# Patient Record
Sex: FEMALE | Race: WHITE | NOT HISPANIC OR LATINO | Employment: OTHER | URBAN - METROPOLITAN AREA
[De-identification: names, ages, dates, MRNs, and addresses within clinical notes are randomized per-mention and may not be internally consistent; named-entity substitution may affect disease eponyms.]

---

## 2017-06-22 DIAGNOSIS — I10 ESSENTIAL HYPERTENSION: ICD-10-CM

## 2017-06-22 DIAGNOSIS — E78.2 MIXED HYPERLIPIDEMIA: ICD-10-CM

## 2017-06-22 RX ORDER — CARVEDILOL 25 MG/1
TABLET ORAL
Qty: 180 TAB | Refills: 0 | Status: SHIPPED | OUTPATIENT
Start: 2017-06-22

## 2017-06-22 RX ORDER — SIMVASTATIN 20 MG
TABLET ORAL
Qty: 90 TAB | Refills: 0 | Status: SHIPPED | OUTPATIENT
Start: 2017-06-22 | End: 2023-11-09

## 2017-12-07 DIAGNOSIS — I10 BENIGN ESSENTIAL HYPERTENSION: Chronic | ICD-10-CM

## 2017-12-07 DIAGNOSIS — I42.0 DILATED CARDIOMYOPATHY (HCC): Chronic | ICD-10-CM

## 2017-12-07 DIAGNOSIS — I50.42 CHRONIC COMBINED SYSTOLIC AND DIASTOLIC CONGESTIVE HEART FAILURE (HCC): ICD-10-CM

## 2017-12-07 RX ORDER — FUROSEMIDE 20 MG/1
TABLET ORAL
Qty: 90 TAB | Refills: 0 | Status: ON HOLD | OUTPATIENT
Start: 2017-12-07 | End: 2022-05-16

## 2021-03-03 DIAGNOSIS — Z23 NEED FOR VACCINATION: ICD-10-CM

## 2022-05-15 ENCOUNTER — APPOINTMENT (OUTPATIENT)
Dept: RADIOLOGY | Facility: MEDICAL CENTER | Age: 67
End: 2022-05-15
Attending: EMERGENCY MEDICINE
Payer: MEDICARE

## 2022-05-15 ENCOUNTER — HOSPITAL ENCOUNTER (OUTPATIENT)
Facility: MEDICAL CENTER | Age: 67
End: 2022-05-16
Attending: EMERGENCY MEDICINE | Admitting: STUDENT IN AN ORGANIZED HEALTH CARE EDUCATION/TRAINING PROGRAM
Payer: MEDICARE

## 2022-05-15 DIAGNOSIS — N28.9 ACUTE RENAL INSUFFICIENCY: ICD-10-CM

## 2022-05-15 DIAGNOSIS — I95.9 HYPOTENSION, UNSPECIFIED HYPOTENSION TYPE: ICD-10-CM

## 2022-05-15 DIAGNOSIS — R53.1 GENERALIZED WEAKNESS: ICD-10-CM

## 2022-05-15 DIAGNOSIS — R53.1 WEAKNESS: ICD-10-CM

## 2022-05-15 DIAGNOSIS — R73.9 HYPERGLYCEMIA: ICD-10-CM

## 2022-05-15 PROBLEM — N17.9 AKI (ACUTE KIDNEY INJURY) (HCC): Status: ACTIVE | Noted: 2022-05-15

## 2022-05-15 LAB
ALBUMIN SERPL BCP-MCNC: 3.8 G/DL (ref 3.2–4.9)
ALBUMIN/GLOB SERPL: 1.2 G/DL
ALP SERPL-CCNC: 81 U/L (ref 30–99)
ALT SERPL-CCNC: 36 U/L (ref 2–50)
ANION GAP SERPL CALC-SCNC: 14 MMOL/L (ref 7–16)
APPEARANCE UR: CLEAR
AST SERPL-CCNC: 35 U/L (ref 12–45)
BACTERIA #/AREA URNS HPF: NEGATIVE /HPF
BASOPHILS # BLD AUTO: 0.7 % (ref 0–1.8)
BASOPHILS # BLD: 0.05 K/UL (ref 0–0.12)
BILIRUB SERPL-MCNC: 0.3 MG/DL (ref 0.1–1.5)
BILIRUB UR QL STRIP.AUTO: NEGATIVE
BUN SERPL-MCNC: 28 MG/DL (ref 8–22)
CALCIUM SERPL-MCNC: 8.8 MG/DL (ref 8.5–10.5)
CHLORIDE SERPL-SCNC: 101 MMOL/L (ref 96–112)
CO2 SERPL-SCNC: 20 MMOL/L (ref 20–33)
COLOR UR: YELLOW
CREAT SERPL-MCNC: 1.82 MG/DL (ref 0.5–1.4)
EOSINOPHIL # BLD AUTO: 0.25 K/UL (ref 0–0.51)
EOSINOPHIL NFR BLD: 3.6 % (ref 0–6.9)
EPI CELLS #/AREA URNS HPF: ABNORMAL /HPF
ERYTHROCYTE [DISTWIDTH] IN BLOOD BY AUTOMATED COUNT: 44.4 FL (ref 35.9–50)
GFR SERPLBLD CREATININE-BSD FMLA CKD-EPI: 30 ML/MIN/1.73 M 2
GLOBULIN SER CALC-MCNC: 3.1 G/DL (ref 1.9–3.5)
GLUCOSE BLD STRIP.AUTO-MCNC: 261 MG/DL (ref 65–99)
GLUCOSE BLD STRIP.AUTO-MCNC: 91 MG/DL (ref 65–99)
GLUCOSE SERPL-MCNC: 271 MG/DL (ref 65–99)
GLUCOSE UR STRIP.AUTO-MCNC: >=1000 MG/DL
HCT VFR BLD AUTO: 35.1 % (ref 37–47)
HGB BLD-MCNC: 11.2 G/DL (ref 12–16)
IMM GRANULOCYTES # BLD AUTO: 0.06 K/UL (ref 0–0.11)
IMM GRANULOCYTES NFR BLD AUTO: 0.9 % (ref 0–0.9)
KETONES UR STRIP.AUTO-MCNC: NEGATIVE MG/DL
LACTATE BLD-SCNC: 1 MMOL/L (ref 0.5–2)
LACTATE BLD-SCNC: 1.9 MMOL/L (ref 0.5–2)
LEUKOCYTE ESTERASE UR QL STRIP.AUTO: ABNORMAL
LYMPHOCYTES # BLD AUTO: 1.92 K/UL (ref 1–4.8)
LYMPHOCYTES NFR BLD: 28 % (ref 22–41)
MCH RBC QN AUTO: 25.7 PG (ref 27–33)
MCHC RBC AUTO-ENTMCNC: 31.9 G/DL (ref 33.6–35)
MCV RBC AUTO: 80.5 FL (ref 81.4–97.8)
MICRO URNS: ABNORMAL
MONOCYTES # BLD AUTO: 0.59 K/UL (ref 0–0.85)
MONOCYTES NFR BLD AUTO: 8.6 % (ref 0–13.4)
NEUTROPHILS # BLD AUTO: 3.98 K/UL (ref 2–7.15)
NEUTROPHILS NFR BLD: 58.2 % (ref 44–72)
NITRITE UR QL STRIP.AUTO: NEGATIVE
NRBC # BLD AUTO: 0 K/UL
NRBC BLD-RTO: 0 /100 WBC
PH UR STRIP.AUTO: 5 [PH] (ref 5–8)
PLATELET # BLD AUTO: 173 K/UL (ref 164–446)
PMV BLD AUTO: 9.4 FL (ref 9–12.9)
POC BREATHALIZER: 0 PERCENT (ref 0–0.01)
POTASSIUM SERPL-SCNC: 3.6 MMOL/L (ref 3.6–5.5)
PROT SERPL-MCNC: 6.9 G/DL (ref 6–8.2)
PROT UR QL STRIP: NEGATIVE MG/DL
RBC # BLD AUTO: 4.36 M/UL (ref 4.2–5.4)
RBC # URNS HPF: ABNORMAL /HPF
RBC UR QL AUTO: NEGATIVE
SODIUM SERPL-SCNC: 135 MMOL/L (ref 135–145)
SP GR UR STRIP.AUTO: 1.02
UROBILINOGEN UR STRIP.AUTO-MCNC: 0.2 MG/DL
WBC # BLD AUTO: 6.9 K/UL (ref 4.8–10.8)
WBC #/AREA URNS HPF: ABNORMAL /HPF

## 2022-05-15 PROCEDURE — 71045 X-RAY EXAM CHEST 1 VIEW: CPT

## 2022-05-15 PROCEDURE — 87040 BLOOD CULTURE FOR BACTERIA: CPT

## 2022-05-15 PROCEDURE — 700105 HCHG RX REV CODE 258: Performed by: EMERGENCY MEDICINE

## 2022-05-15 PROCEDURE — 700102 HCHG RX REV CODE 250 W/ 637 OVERRIDE(OP): Performed by: STUDENT IN AN ORGANIZED HEALTH CARE EDUCATION/TRAINING PROGRAM

## 2022-05-15 PROCEDURE — 700105 HCHG RX REV CODE 258: Performed by: STUDENT IN AN ORGANIZED HEALTH CARE EDUCATION/TRAINING PROGRAM

## 2022-05-15 PROCEDURE — 81001 URINALYSIS AUTO W/SCOPE: CPT

## 2022-05-15 PROCEDURE — 99220 PR INITIAL OBSERVATION CARE,LEVL III: CPT | Performed by: STUDENT IN AN ORGANIZED HEALTH CARE EDUCATION/TRAINING PROGRAM

## 2022-05-15 PROCEDURE — 93005 ELECTROCARDIOGRAM TRACING: CPT | Performed by: STUDENT IN AN ORGANIZED HEALTH CARE EDUCATION/TRAINING PROGRAM

## 2022-05-15 PROCEDURE — 80053 COMPREHEN METABOLIC PANEL: CPT

## 2022-05-15 PROCEDURE — A9270 NON-COVERED ITEM OR SERVICE: HCPCS | Performed by: STUDENT IN AN ORGANIZED HEALTH CARE EDUCATION/TRAINING PROGRAM

## 2022-05-15 PROCEDURE — 302970 POC BREATHALIZER: Performed by: EMERGENCY MEDICINE

## 2022-05-15 PROCEDURE — 96372 THER/PROPH/DIAG INJ SC/IM: CPT

## 2022-05-15 PROCEDURE — 85025 COMPLETE CBC W/AUTO DIFF WBC: CPT

## 2022-05-15 PROCEDURE — 700111 HCHG RX REV CODE 636 W/ 250 OVERRIDE (IP): Performed by: STUDENT IN AN ORGANIZED HEALTH CARE EDUCATION/TRAINING PROGRAM

## 2022-05-15 PROCEDURE — 99285 EMERGENCY DEPT VISIT HI MDM: CPT

## 2022-05-15 PROCEDURE — 82962 GLUCOSE BLOOD TEST: CPT | Mod: 91

## 2022-05-15 PROCEDURE — 36415 COLL VENOUS BLD VENIPUNCTURE: CPT

## 2022-05-15 PROCEDURE — G0378 HOSPITAL OBSERVATION PER HR: HCPCS

## 2022-05-15 PROCEDURE — 83605 ASSAY OF LACTIC ACID: CPT

## 2022-05-15 PROCEDURE — 87086 URINE CULTURE/COLONY COUNT: CPT

## 2022-05-15 RX ORDER — HEPARIN SODIUM 5000 [USP'U]/ML
5000 INJECTION, SOLUTION INTRAVENOUS; SUBCUTANEOUS EVERY 8 HOURS
Status: DISCONTINUED | OUTPATIENT
Start: 2022-05-15 | End: 2022-05-16 | Stop reason: HOSPADM

## 2022-05-15 RX ORDER — NICOTINE 21 MG/24HR
1 PATCH, TRANSDERMAL 24 HOURS TRANSDERMAL EVERY 24 HOURS
COMMUNITY
End: 2022-12-03

## 2022-05-15 RX ORDER — SODIUM CHLORIDE 9 MG/ML
INJECTION, SOLUTION INTRAVENOUS CONTINUOUS
Status: ACTIVE | OUTPATIENT
Start: 2022-05-15 | End: 2022-05-16

## 2022-05-15 RX ORDER — SIMVASTATIN 40 MG
20 TABLET ORAL EVERY EVENING
Status: DISCONTINUED | OUTPATIENT
Start: 2022-05-15 | End: 2022-05-16 | Stop reason: HOSPADM

## 2022-05-15 RX ORDER — GLIPIZIDE 10 MG/1
10 TABLET ORAL 2 TIMES DAILY
Status: ON HOLD | COMMUNITY
End: 2022-07-12

## 2022-05-15 RX ORDER — GABAPENTIN 300 MG/1
300 CAPSULE ORAL 3 TIMES DAILY
Status: DISCONTINUED | OUTPATIENT
Start: 2022-05-15 | End: 2022-05-15

## 2022-05-15 RX ORDER — INSULIN ASPART 100 [IU]/ML
2-18 INJECTION, SOLUTION INTRAVENOUS; SUBCUTANEOUS 2 TIMES DAILY
COMMUNITY

## 2022-05-15 RX ORDER — TIOTROPIUM BROMIDE INHALATION SPRAY 3.12 UG/1
5 SPRAY, METERED RESPIRATORY (INHALATION) DAILY
COMMUNITY
End: 2022-12-03

## 2022-05-15 RX ORDER — GABAPENTIN 300 MG/1
600-900 CAPSULE ORAL
Status: ON HOLD | COMMUNITY
End: 2022-12-06

## 2022-05-15 RX ORDER — GABAPENTIN 300 MG/1
300 CAPSULE ORAL 2 TIMES DAILY
Status: DISCONTINUED | OUTPATIENT
Start: 2022-05-15 | End: 2022-05-16 | Stop reason: HOSPADM

## 2022-05-15 RX ORDER — INSULIN ASPART 100 [IU]/ML
INJECTION, SOLUTION INTRAVENOUS; SUBCUTANEOUS
Status: SHIPPED | COMMUNITY
End: 2022-05-15

## 2022-05-15 RX ORDER — FAMOTIDINE 20 MG/1
20 TABLET, FILM COATED ORAL 2 TIMES DAILY
COMMUNITY
End: 2023-11-09

## 2022-05-15 RX ORDER — LINAGLIPTIN 5 MG/1
5 TABLET, FILM COATED ORAL DAILY
COMMUNITY
End: 2023-11-09

## 2022-05-15 RX ORDER — SODIUM CHLORIDE, SODIUM LACTATE, POTASSIUM CHLORIDE, AND CALCIUM CHLORIDE .6; .31; .03; .02 G/100ML; G/100ML; G/100ML; G/100ML
30 INJECTION, SOLUTION INTRAVENOUS ONCE
Status: COMPLETED | OUTPATIENT
Start: 2022-05-15 | End: 2022-05-15

## 2022-05-15 RX ORDER — EMPAGLIFLOZIN 25 MG/1
1 TABLET, FILM COATED ORAL DAILY
COMMUNITY
End: 2023-11-09

## 2022-05-15 RX ORDER — CYCLOBENZAPRINE HCL 10 MG
10 TABLET ORAL
COMMUNITY
End: 2022-12-03

## 2022-05-15 RX ADMIN — SIMVASTATIN 20 MG: 40 TABLET, FILM COATED ORAL at 22:06

## 2022-05-15 RX ADMIN — SODIUM CHLORIDE, POTASSIUM CHLORIDE, SODIUM LACTATE AND CALCIUM CHLORIDE 1932 ML: 600; 310; 30; 20 INJECTION, SOLUTION INTRAVENOUS at 16:26

## 2022-05-15 RX ADMIN — INSULIN GLARGINE-YFGN 15 UNITS: 100 INJECTION, SOLUTION SUBCUTANEOUS at 21:33

## 2022-05-15 RX ADMIN — SODIUM CHLORIDE: 9 INJECTION, SOLUTION INTRAVENOUS at 21:12

## 2022-05-15 RX ADMIN — HEPARIN SODIUM 5000 UNITS: 5000 INJECTION, SOLUTION INTRAVENOUS; SUBCUTANEOUS at 21:31

## 2022-05-15 RX ADMIN — GABAPENTIN 300 MG: 300 CAPSULE ORAL at 21:30

## 2022-05-15 ASSESSMENT — ENCOUNTER SYMPTOMS
EYES NEGATIVE: 1
GASTROINTESTINAL NEGATIVE: 1
MUSCULOSKELETAL NEGATIVE: 1
CARDIOVASCULAR NEGATIVE: 1
WEAKNESS: 1
PSYCHIATRIC NEGATIVE: 1
RESPIRATORY NEGATIVE: 1

## 2022-05-15 ASSESSMENT — PATIENT HEALTH QUESTIONNAIRE - PHQ9
SUM OF ALL RESPONSES TO PHQ9 QUESTIONS 1 AND 2: 0
1. LITTLE INTEREST OR PLEASURE IN DOING THINGS: NOT AT ALL
2. FEELING DOWN, DEPRESSED, IRRITABLE, OR HOPELESS: NOT AT ALL

## 2022-05-15 ASSESSMENT — LIFESTYLE VARIABLES
AVERAGE NUMBER OF DAYS PER WEEK YOU HAVE A DRINK CONTAINING ALCOHOL: 0
TOTAL SCORE: 0
HAVE PEOPLE ANNOYED YOU BY CRITICIZING YOUR DRINKING: NO
TOTAL SCORE: 0
EVER HAD A DRINK FIRST THING IN THE MORNING TO STEADY YOUR NERVES TO GET RID OF A HANGOVER: NO
TOTAL SCORE: 0
EVER FELT BAD OR GUILTY ABOUT YOUR DRINKING: NO
ALCOHOL_USE: NO
CONSUMPTION TOTAL: NEGATIVE
HOW MANY TIMES IN THE PAST YEAR HAVE YOU HAD 5 OR MORE DRINKS IN A DAY: 0
ON A TYPICAL DAY WHEN YOU DRINK ALCOHOL HOW MANY DRINKS DO YOU HAVE: 0
HAVE YOU EVER FELT YOU SHOULD CUT DOWN ON YOUR DRINKING: NO

## 2022-05-15 ASSESSMENT — FIBROSIS 4 INDEX: FIB4 SCORE: 2.29

## 2022-05-15 ASSESSMENT — PAIN DESCRIPTION - PAIN TYPE: TYPE: ACUTE PAIN

## 2022-05-15 NOTE — ED NOTES
Patient drawn for blood cultures by lab. Fluids infusing per orders. Patient aware of need for urine.

## 2022-05-15 NOTE — ED TRIAGE NOTES
Chief Complaint   Patient presents with   • Weakness     Patient for the last three days has been feeling generalized weakness and possibly confusion.     Pt reports having lower BP over the last couple days and her blood sugar on arrival was 338 and she took her home 8 units per her sliding scale.     PIV present on arrival. EMS with no treatment. Pt is AOx4.  Chart up for ERP.

## 2022-05-15 NOTE — ED PROVIDER NOTES
ED Provider  Scribed for Estuardo Marte D.O. by Mayito Kraft. 5/15/2022  3:30 PM    Means of arrival:EMS  History obtained from:Patient  History limited by: None    CHIEF COMPLAINT  Chief Complaint   Patient presents with    Weakness     Patient for the last three days has been feeling generalized weakness and possibly confusion.       HPI  Mckenna Sewell is a 66 y.o. female with history of pacemaker placement who presents for generalized weakness onset 3 days ago. Patient reports she was hospitalized for 5 days several months ago for pneumonia. Patient is currently on 2-3 L O2 at home. Daughter reports the patient was hypotensive at around 86/50 this morning, which prompted her to call EMS. Patient endorses associated shortness of breath, but denies any pain with urination, changes in urinary frequency, unilateral weakness, fevers, chills, nausea, or vomitiing.     REVIEW OF SYSTEMS  See HPI for further details. All other systems are negative.     PAST MEDICAL HISTORY   has a past medical history of MAXIMILIANO (acute kidney injury) ([facility]) (5/15/2022), Benign essential hypertension, CAD (coronary artery disease), CHF (congestive heart failure) (HCC), Congestive heart failure (HCC), COPD, COPD (chronic obstructive pulmonary disease) ([facility]), Dilated cardiomyopathy (HCC), History of cardiac catheterization, Hypertension, Mixed hyperlipidemia, Nicotine dependence, Nicotine dependence, and Type 2 diabetes mellitus with complication ([facility]).    SOCIAL HISTORY  Social History     Tobacco Use    Smoking status: Current Every Day Smoker    Smokeless tobacco: None noted    Tobacco comment: 1/2 ppd   Substance and Sexual Activity    Alcohol use: No    Drug use: No    Sexual activity: None noted       SURGICAL HISTORY   has a past surgical history that includes other cardiac surgery and appendectomy.    CURRENT MEDICATIONS  Home Medications       Reviewed by Luca Mercado (Pharmacy Tech) on 05/15/22 at 2000  Med List Status:  "Complete     Medication Last Dose Status   albuterol (VENTOLIN OR PROVENTIL) 108 (90 BASE) MCG/ACT Aero Soln inhalation aerosol PRN Active   aspirin EC (ECOTRIN) 81 MG TBEC 5/15/2022 Active   carvedilol (COREG) 25 MG Tab 5/15/2022 Active   cyclobenzaprine (FLEXERIL) 10 mg Tab 5/14/2022 Active   Empagliflozin (JARDIANCE) 25 MG Tab 5/15/2022 Active   famotidine (PEPCID) 20 MG Tab 5/15/2022 Active   furosemide (LASIX) 20 MG Tab 5/15/2022 Active   gabapentin (NEURONTIN) 300 MG Cap 5/14/2022 Active   glipiZIDE (GLUCOTROL) 10 MG Tab 5/15/2022 Active   insulin aspart (NOVOLOG FLEXPEN) 100 UNIT/ML injection PEN 5/15/2022 Active   Ipratropium-Albuterol (COMBIVENT INH) PRN Active   linagliptin (TRADJENTA) 5 MG Tab tablet 5/15/2022 Active   losartan (COZAAR) 50 MG Tab 5/15/2022 Active   nicotine (NICODERM) 14 MG/24HR PATCH 24 HR >2 DAY Active   simvastatin (ZOCOR) 20 MG Tab 5/14/2022 Active   tiotropium (SPIRIVA RESPIMAT) 2.5 mcg/Act Aero Soln 5/15/2022 Active                    ALLERGIES  Allergies   Allergen Reactions    Advair Diskus      Rash/hives    Codeine     Lisinopril     Sulfa Drugs        PHYSICAL EXAM  VITAL SIGNS: BP (!) 99/56   Pulse 77   Resp 18   Ht 1.626 m (5' 4\")   Wt 64.4 kg (142 lb)   SpO2 91%   BMI 24.37 kg/m²   Constitutional: Alert in no apparent distress.  HENT: No signs of trauma, mucous membranes are moist  Eyes: Conjunctiva normal, Non-icteric.   Neck: Normal range of motion, No tenderness, Supple.  Lymphatic: No lymphadenopathy noted.   Cardiovascular: Regular rate and rhythm, no murmurs.   Thorax & Lungs: Normal breath sounds, No respiratory distress, No wheezing, No chest tenderness.   Abdomen: Bowel sounds normal, Soft, No tenderness, No masses, No pulsatile masses. No peritoneal signs.  Skin: Warm, Dry, normal color.   Back: No bony tenderness, No CVA tenderness.   Extremities: No edema, No tenderness, No cyanosis  Musculoskeletal: Good range of motion in all major joints. No tenderness " to palpation or major deformities noted.   Neurologic: Alert and oriented x4, Normal motor function, Normal sensory function, No focal deficits noted.   Psychiatric: Affect normal, Judgment normal, Mood normal.     DIAGNOSTIC STUDIES / PROCEDURES    LABS  Results for orders placed or performed during the hospital encounter of 05/15/22   LACTIC ACID   Result Value Ref Range    Lactic Acid 1.9 0.5 - 2.0 mmol/L   LACTIC ACID   Result Value Ref Range    Lactic Acid 1.0 0.5 - 2.0 mmol/L   CBC WITH DIFFERENTIAL   Result Value Ref Range    WBC 6.9 4.8 - 10.8 K/uL    RBC 4.36 4.20 - 5.40 M/uL    Hemoglobin 11.2 (L) 12.0 - 16.0 g/dL    Hematocrit 35.1 (L) 37.0 - 47.0 %    MCV 80.5 (L) 81.4 - 97.8 fL    MCH 25.7 (L) 27.0 - 33.0 pg    MCHC 31.9 (L) 33.6 - 35.0 g/dL    RDW 44.4 35.9 - 50.0 fL    Platelet Count 173 164 - 446 K/uL    MPV 9.4 9.0 - 12.9 fL    Neutrophils-Polys 58.20 44.00 - 72.00 %    Lymphocytes 28.00 22.00 - 41.00 %    Monocytes 8.60 0.00 - 13.40 %    Eosinophils 3.60 0.00 - 6.90 %    Basophils 0.70 0.00 - 1.80 %    Immature Granulocytes 0.90 0.00 - 0.90 %    Nucleated RBC 0.00 /100 WBC    Neutrophils (Absolute) 3.98 2.00 - 7.15 K/uL    Lymphs (Absolute) 1.92 1.00 - 4.80 K/uL    Monos (Absolute) 0.59 0.00 - 0.85 K/uL    Eos (Absolute) 0.25 0.00 - 0.51 K/uL    Baso (Absolute) 0.05 0.00 - 0.12 K/uL    Immature Granulocytes (abs) 0.06 0.00 - 0.11 K/uL    NRBC (Absolute) 0.00 K/uL   COMP METABOLIC PANEL   Result Value Ref Range    Sodium 135 135 - 145 mmol/L    Potassium 3.6 3.6 - 5.5 mmol/L    Chloride 101 96 - 112 mmol/L    Co2 20 20 - 33 mmol/L    Anion Gap 14.0 7.0 - 16.0    Glucose 271 (H) 65 - 99 mg/dL    Bun 28 (H) 8 - 22 mg/dL    Creatinine 1.82 (H) 0.50 - 1.40 mg/dL    Calcium 8.8 8.5 - 10.5 mg/dL    AST(SGOT) 35 12 - 45 U/L    ALT(SGPT) 36 2 - 50 U/L    Alkaline Phosphatase 81 30 - 99 U/L    Total Bilirubin 0.3 0.1 - 1.5 mg/dL    Albumin 3.8 3.2 - 4.9 g/dL    Total Protein 6.9 6.0 - 8.2 g/dL    Globulin  3.1 1.9 - 3.5 g/dL    A-G Ratio 1.2 g/dL   URINALYSIS    Specimen: Urine   Result Value Ref Range    Micro Urine Req Microscopic    ESTIMATED GFR   Result Value Ref Range    GFR (CKD-EPI) 30 (A) >60 mL/min/1.73 m 2   POC BREATHALIZER   Result Value Ref Range    POC Breathalizer 0.00 0.00 - 0.01 Percent   POCT glucose device results   Result Value Ref Range    POC Glucose, Blood 261 (H) 65 - 99 mg/dL      All labs reviewed by me.    RADIOLOGY  DX-CHEST-PORTABLE (1 VIEW)   Final Result      No acute cardiopulmonary abnormality identified.        The radiologist's interpretations of all radiological studies have been reviewed by me.    Films have been independently by me      COURSE  Pertinent Labs & Imaging studies reviewed. (See chart for details)    3:30 PM - Patient seen and examined at bedside. Discussed plan of care. The patient will be resuscitated with Bolus. Ordered for CXR, Lactic acid, CBC w/ diff, CMP, UA, Urine Culture, and Blood Culture to evaluate her symptoms.     5:05 PM - Patient was reevaluated at bedside. Discussed lab and radiology results with the patient and/or family. Ordered POC Breathalizer to further evaluate.      6:30 PM I discussed the patient's case and the above findings with Dr. Torres (Hospitalist) who agrees to evaluate the patient for hospitalization.    HYDRATION: Based on the patient's presentation of Sepsis the patient was given IV fluids. IV Hydration was used because oral hydration was not adequate alone. Upon recheck following hydration, the patient was improved.     MEDICAL DECISION MAKING  This is a 66 y.o. female who presents with generalized weakness, she has been walking into the walls.  At home her blood pressure was found to be in the 80s, and did improve with sitting down.  IV fluids were given here.  Her blood pressure was low normal and her blood pressure did improve and her symptoms improved after receiving some IV fluids.    Septic evaluation was done and there is  no signs of obvious sepsis.  Urinalysis was pending at time of admission.  Spoke with hospitalist for admission she will be admitted for further evaluation and treatment    Critical care time 30 minutes      DISPOSITION:  Patient will be hospitalized by Dr. Torres in guarded condition.     FINAL IMPRESSION  1. Weakness    2. Acute renal insufficiency    3. Hyperglycemia    4. Generalized weakness    5. Hypotension, unspecified hypotension type         I, Mayito Kraft (Scribe), am scribing for, and in the presence of, Estuardo Marte D.O..    Electronically signed by: Mayito Kraft (Scribe), 5/15/2022    IEstuardo D.O. personally performed the services described in this documentation, as scribed by Mayito Kraft in my presence, and it is both accurate and complete.    The note accurately reflects work and decisions made by me.  Estuardo Marte D.O.  5/15/2022  8:48 PM

## 2022-05-15 NOTE — ED TRIAGE NOTES
"Patient's FSBS on arrival is 261. Pt states she ate chinese food and a sugary coffee today. Per daughter at bedside she has been sleeping excessively in the last couple days and \"not herself\".     "

## 2022-05-16 VITALS
SYSTOLIC BLOOD PRESSURE: 144 MMHG | RESPIRATION RATE: 18 BRPM | HEIGHT: 64 IN | BODY MASS INDEX: 25.63 KG/M2 | TEMPERATURE: 97 F | OXYGEN SATURATION: 91 % | HEART RATE: 87 BPM | WEIGHT: 150.13 LBS | DIASTOLIC BLOOD PRESSURE: 70 MMHG

## 2022-05-16 PROBLEM — N17.9 AKI (ACUTE KIDNEY INJURY) (HCC): Status: RESOLVED | Noted: 2022-05-15 | Resolved: 2022-05-16

## 2022-05-16 PROBLEM — R53.1 GENERALIZED WEAKNESS: Status: RESOLVED | Noted: 2022-05-15 | Resolved: 2022-05-16

## 2022-05-16 LAB
ANION GAP SERPL CALC-SCNC: 10 MMOL/L (ref 7–16)
BUN SERPL-MCNC: 23 MG/DL (ref 8–22)
CALCIUM SERPL-MCNC: 8.6 MG/DL (ref 8.5–10.5)
CHLORIDE SERPL-SCNC: 107 MMOL/L (ref 96–112)
CO2 SERPL-SCNC: 24 MMOL/L (ref 20–33)
CREAT SERPL-MCNC: 1.07 MG/DL (ref 0.5–1.4)
EKG IMPRESSION: NORMAL
FERRITIN SERPL-MCNC: 33.8 NG/ML (ref 10–291)
GFR SERPLBLD CREATININE-BSD FMLA CKD-EPI: 56 ML/MIN/1.73 M 2
GLUCOSE BLD STRIP.AUTO-MCNC: 162 MG/DL (ref 65–99)
GLUCOSE BLD STRIP.AUTO-MCNC: 88 MG/DL (ref 65–99)
GLUCOSE SERPL-MCNC: 170 MG/DL (ref 65–99)
IRON SATN MFR SERPL: 9 % (ref 15–55)
IRON SERPL-MCNC: 32 UG/DL (ref 40–170)
POTASSIUM SERPL-SCNC: 3.4 MMOL/L (ref 3.6–5.5)
SODIUM SERPL-SCNC: 141 MMOL/L (ref 135–145)
TIBC SERPL-MCNC: 367 UG/DL (ref 250–450)
UIBC SERPL-MCNC: 335 UG/DL (ref 110–370)

## 2022-05-16 PROCEDURE — 700102 HCHG RX REV CODE 250 W/ 637 OVERRIDE(OP): Performed by: STUDENT IN AN ORGANIZED HEALTH CARE EDUCATION/TRAINING PROGRAM

## 2022-05-16 PROCEDURE — 80048 BASIC METABOLIC PNL TOTAL CA: CPT

## 2022-05-16 PROCEDURE — 82962 GLUCOSE BLOOD TEST: CPT

## 2022-05-16 PROCEDURE — A9270 NON-COVERED ITEM OR SERVICE: HCPCS | Performed by: STUDENT IN AN ORGANIZED HEALTH CARE EDUCATION/TRAINING PROGRAM

## 2022-05-16 PROCEDURE — 83550 IRON BINDING TEST: CPT

## 2022-05-16 PROCEDURE — 96372 THER/PROPH/DIAG INJ SC/IM: CPT

## 2022-05-16 PROCEDURE — 93010 ELECTROCARDIOGRAM REPORT: CPT | Performed by: INTERNAL MEDICINE

## 2022-05-16 PROCEDURE — 82728 ASSAY OF FERRITIN: CPT

## 2022-05-16 PROCEDURE — 83540 ASSAY OF IRON: CPT

## 2022-05-16 PROCEDURE — 97161 PT EVAL LOW COMPLEX 20 MIN: CPT

## 2022-05-16 PROCEDURE — G0378 HOSPITAL OBSERVATION PER HR: HCPCS

## 2022-05-16 PROCEDURE — 700102 HCHG RX REV CODE 250 W/ 637 OVERRIDE(OP): Performed by: INTERNAL MEDICINE

## 2022-05-16 PROCEDURE — 99217 PR OBSERVATION CARE DISCHARGE: CPT | Performed by: INTERNAL MEDICINE

## 2022-05-16 PROCEDURE — A9270 NON-COVERED ITEM OR SERVICE: HCPCS | Performed by: INTERNAL MEDICINE

## 2022-05-16 RX ORDER — POTASSIUM CHLORIDE 20 MEQ/1
40 TABLET, EXTENDED RELEASE ORAL ONCE
Status: COMPLETED | OUTPATIENT
Start: 2022-05-16 | End: 2022-05-16

## 2022-05-16 RX ADMIN — POTASSIUM CHLORIDE 40 MEQ: 1500 TABLET, EXTENDED RELEASE ORAL at 08:34

## 2022-05-16 RX ADMIN — INSULIN HUMAN 3 UNITS: 100 INJECTION, SOLUTION PARENTERAL at 00:57

## 2022-05-16 RX ADMIN — GABAPENTIN 300 MG: 300 CAPSULE ORAL at 06:21

## 2022-05-16 RX ADMIN — ASPIRIN 81 MG: 81 TABLET, COATED ORAL at 06:21

## 2022-05-16 ASSESSMENT — COGNITIVE AND FUNCTIONAL STATUS - GENERAL
MOVING TO AND FROM BED TO CHAIR: A LITTLE
MOBILITY SCORE: 21
SUGGESTED CMS G CODE MODIFIER MOBILITY: CJ
TURNING FROM BACK TO SIDE WHILE IN FLAT BAD: A LITTLE
MOVING FROM LYING ON BACK TO SITTING ON SIDE OF FLAT BED: A LITTLE

## 2022-05-16 ASSESSMENT — GAIT ASSESSMENTS
GAIT LEVEL OF ASSIST: SUPERVISED
DEVIATION: DECREASED HEEL STRIKE;DECREASED TOE OFF
DISTANCE (FEET): 50

## 2022-05-16 ASSESSMENT — PAIN DESCRIPTION - PAIN TYPE: TYPE: ACUTE PAIN

## 2022-05-16 NOTE — PROGRESS NOTES
4 Eyes Skin Assessment Completed by AUNDREA Sheikh and AUNDREA Davis.    Head WDL  Ears WDL  Nose WDL  Mouth WDL  Neck WDL  Breast/Chest WDL  Shoulder Blades WDL  Spine WDL  (R) Arm/Elbow/Hand WDL  (L) Arm/Elbow/Hand WDL  Abdomen WDL  Groin WDL  Scrotum/Coccyx/Buttocks WDL  (R) Leg WDL  (L) Leg WDL  (R) Heel/Foot/Toe WDL  (L) Heel/Foot/Toe WDL          Devices In Places Pulse Ox      Interventions In Place N/A    Possible Skin Injury No    Pictures Uploaded Into Epic N/A  Wound Consult Placed N/A  RN Wound Prevention Protocol Ordered No

## 2022-05-16 NOTE — PROGRESS NOTES
Received bedside report from NOC RN. Pt is A&Ox4. Pt is sitting up in bed, no signs of labored breathing or pain. Denies CP/SOB. Pt on 3L of O2, baseline per pt. Call light & personal belongings within reach, bed in lowest position & locked. Fall precautions in place and education provided on how to use call light, hourly rounding in place. Educated on calling before getting OOB. Pt updated on plan of care for the shift. Pt declines any additional needs at this time.

## 2022-05-16 NOTE — DISCHARGE SUMMARY
Discharge Summary    CHIEF COMPLAINT ON ADMISSION  Chief Complaint   Patient presents with   • Weakness     Patient for the last three days has been feeling generalized weakness and possibly confusion.       Reason for Admission  Acute kidney injury likely related to Lasix  Iron deficiency anemia    Admission Date  5/15/2022    CODE STATUS  Full Code    HPI & HOSPITAL COURSE    66-year-old female with history of heart failure, pacemaker, diabetes and dyslipidemia with hypertension presented 5/15 with generalized weakness and hypotension, denies significant chest pain or shortness of breath, no fever or chills and no urinary or bowel symptoms, patient was found to have dehydration and MAXIMILIANO, cr 1.8, with hypotension 90/50, patient was using Lasix, which was held, patient has history of heart failure following with [facility], with IV fluid her creatinine back to baseline and improving her blood pressure, patient feels okay for discharge.    Her labs showed iron deficiency anemia with hemoglobin around 11 and MCV 80, patient denied any history of melena or significant losing weight, patient denied any history of colonoscopy, discussed importance of following with PCP for GI referral for colonoscopy to rule out any abnormalities including colon cancer, patient understood and agreed.    On the day of discharge the patient is alert oriented x4, no crackles or lower extremity edema, will keep holding Lasix on discharge, creatinine was 1.07, blood pressure is normal, patient is to follow-up with her cardiologist and PCP to resume Lasix if needed.    Therefore, she is discharged in good and stable condition to home with close outpatient follow-up.    The patient recovered much more quickly than anticipated on admission.    Discharge Date  5/16/2022    FOLLOW UP ITEMS POST DISCHARGE  Follow-up with PCP for GI referral for colonoscopy for iron deficiency anemia  Follow-up with cardiology and PCP to adjust her medication  and Lasix if needed    DISCHARGE DIAGNOSES  Principal Problem (Resolved):    MAXIMILIANO (acute kidney injury) (HCC) POA: Unknown  Active Problems:    Benign essential hypertension (Chronic) POA: Yes    Mixed hyperlipidemia (Chronic) POA: Yes    Type 2 diabetes mellitus with complication (HCC) (Chronic) POA: Yes  Resolved Problems:    Generalized weakness POA: Unknown      FOLLOW UP  Willis Gomez M.D.  [address]  [facility] [id number] [phone]    Follow up        MEDICATIONS ON DISCHARGE     Medication List      CONTINUE taking these medications      Instructions   albuterol 108 (90 Base) MCG/ACT Aers inhalation aerosol   Inhale 2 Puffs by mouth every 6 hours as needed for Shortness of Breath.  Dose: 2 Puff     aspirin EC 81 MG Tbec  Commonly known as: ECOTRIN   Take 81 mg by mouth every day.  Dose: 81 mg     carvedilol 25 MG Tabs  Commonly known as: COREG   TAKE ONE TABLET BY MOUTH TWICE DAILY WITH MEALS     COMBIVENT INH   Inhale  by mouth.     cyclobenzaprine 10 mg Tabs  Commonly known as: Flexeril   Take 10 mg by mouth at bedtime.  Dose: 10 mg     famotidine 20 MG Tabs  Commonly known as: PEPCID   Take 20 mg by mouth in the morning and 20 mg in the evening.  Dose: 20 mg     gabapentin 300 MG Caps  Commonly known as: NEURONTIN   Take 600-900 mg by mouth at bedtime.  Dose: 600-900 mg     glipiZIDE 10 MG Tabs  Commonly known as: GLUCOTROL   Take 10 mg by mouth in the morning and 10 mg in the evening.  Dose: 10 mg     Jardiance 25 MG Tabs  Generic drug: Empagliflozin   Take 1 Tablet by mouth every day.  Dose: 1 Tablet     losartan 50 MG Tabs  Commonly known as: COZAAR   Take 1 Tab by mouth 2 Times a Day.  Dose: 50 mg     nicotine 14 MG/24HR Pt24  Commonly known as: NICODERM   Place 1 Patch on the skin every 24 hours.  Dose: 1 Patch     NovoLOG FlexPen 100 UNIT/ML injection PEN  Generic drug: insulin aspart   Inject  under the skin 2 times a day. PER SLIDING SCALE:  2 units for level 150+, then  additional 2 units for every 50 above.     simvastatin 20 MG Tabs  Commonly known as: ZOCOR   TAKE ONE TABLET BY MOUTH ONCE DAILY IN THE EVENING     Spiriva Respimat 2.5 mcg/Act Aers  Generic drug: tiotropium   Inhale 5 mcg every day.  Dose: 5 mcg     Tradjenta 5 MG Tabs tablet  Generic drug: linagliptin   Take 5 mg by mouth every day.  Dose: 5 mg        STOP taking these medications    furosemide 20 MG Tabs  Commonly known as: LASIX            Allergies  Allergies   Allergen Reactions   • Advair Diskus      Rash/hives   • Codeine    • Lisinopril    • Sulfa Drugs        DIET  Orders Placed This Encounter   Procedures   • Diet Order Diet: Cardiac     Standing Status:   Standing     Number of Occurrences:   1     Order Specific Question:   Diet:     Answer:   Cardiac [6]       ACTIVITY  As tolerated.  Weight bearing as tolerated    CONSULTATIONS  None     PROCEDURES  None     LABORATORY  Lab Results   Component Value Date    SODIUM 141 05/16/2022    POTASSIUM 3.4 (L) 05/16/2022    CHLORIDE 107 05/16/2022    CO2 24 05/16/2022    GLUCOSE 170 (H) 05/16/2022    BUN 23 (H) 05/16/2022    CREATININE 1.07 05/16/2022    CREATININE 0.9 10/30/2008        Lab Results   Component Value Date    WBC 6.9 05/15/2022    HEMOGLOBIN 11.2 (L) 05/15/2022    HEMATOCRIT 35.1 (L) 05/15/2022    PLATELETCT 173 05/15/2022        Total time of the discharge process exceeds 35 minutes.

## 2022-05-16 NOTE — CARE PLAN
The patient is         Progress made toward(s) clinical / shift goals:      Patient is not progressing towards the following goals:      Problem: Knowledge Deficit - Standard  Goal: Patient and family/care givers will demonstrate understanding of plan of care, disease process/condition, diagnostic tests and medications  Outcome: Progressing

## 2022-05-16 NOTE — PROGRESS NOTES
Pt being dc'd to home with home O2 (baseline) needs. Pt discharge medications none. IV dc'd. Dc instructions discussed with patient in discharge lounge. All questions answered.  Patient  agreeable to dc plan.  Bedside RN to confirm that patient has all belongings at dc and that all home care needs have been arranged.

## 2022-05-16 NOTE — ASSESSMENT & PLAN NOTE
Possibly due to from borderline hypotension versus UTI  Await UA results  Optimize blood pressure  PT OT

## 2022-05-16 NOTE — THERAPY
Physical Therapy   Initial Evaluation     Patient Name: Mckenna Sewell  Age:  66 y.o., Sex:  female  Medical Record #: [id number]  Today's Date: 5/16/2022          Assessment  Patient is 66 y.o. female admitted with weakness likely due to MAXIMILIANO and anemia. PMH includes HF, pacemaker, DM, and HTN. Pt appears to be functioning at her prior level of function and has family support at dc. No further acute PT needs.     Plan    Recommend Physical Therapy for Evaluation only     DC Equipment Recommendations: None  Discharge Recommendations: Anticipate that the patient will have no further physical therapy needs after discharge from the hospital          05/16/22 0931   Vitals   O2 (LPM) 3   O2 Delivery Device Nasal Cannula   Prior Living Situation   Prior Services Home-Independent   Housing / Facility 2 Story Apartment / Condo   Steps Into Home 1   Steps In Home 14   Bathroom Set up Bathtub / Shower Combination   Equipment Owned None   Lives with - Patient's Self Care Capacity Adult Children   Comments pt lives with her daughter and grandson   Prior Level of Functional Mobility   Bed Mobility Independent   Transfer Status Independent   Ambulation Independent   Distance Ambulation (Feet)   (community)   Assistive Devices Used None   Stairs Independent   Comments independent prior   History of Falls   History of Falls No   Cognition    Cognition / Consciousness WDL   Active ROM Lower Body    Active ROM Lower Body  WDL   Strength Lower Body   Lower Body Strength  WDL   Sensation Lower Body   Lower Extremity Sensation   WDL   Coordination Lower Body    Coordination Lower Body  WDL   Balance Assessment   Sitting Balance (Static) Fair +   Sitting Balance (Dynamic) Fair +   Standing Balance (Static) Fair   Standing Balance (Dynamic) Fair   Weight Shift Sitting Good   Weight Shift Standing Fair   Comments no AD   Gait Analysis   Gait Level Of Assist Supervised   Assistive Device None   Distance (Feet) 50   # of Times Distance was  Traveled 2   Deviation Decreased Heel Strike;Decreased Toe Off   # of Stairs Climbed 0   Weight Bearing Status no restrictions   Comments no LOB, declined stairs   Bed Mobility    Supine to Sit Supervised   Sit to Supine Supervised   Scooting Supervised   Rolling Supervised   Functional Mobility   Sit to Stand Supervised   Bed, Chair, Wheelchair Transfer Supervised   Toilet Transfers Supervised   Transfer Method Stand Step   Mobility in room and hallway with no AD   Anticipated Discharge Equipment and Recommendations   DC Equipment Recommendations None   Discharge Recommendations Anticipate that the patient will have no further physical therapy needs after discharge from the hospital

## 2022-05-16 NOTE — DISCHARGE INSTRUCTIONS
Discharge Instructions    Discharged to home by car with relative. Discharged via wheelchair, hospital escort: Yes.  Special equipment needed: Not Applicable    Be sure to schedule a follow-up appointment with your primary care doctor or any specialists as instructed.     Discharge Plan:   Diet Plan: Discussed  Activity Level: Discussed  Confirmed Follow up Appointment: Patient to Call and Schedule Appointment  Confirmed Symptoms Management: Discussed  Medication Reconciliation Updated: Yes    I understand that a diet low in cholesterol, fat, and sodium is recommended for good health. Unless I have been given specific instructions below for another diet, I accept this instruction as my diet prescription.   Other diet: regular    Special Instructions: None    Is patient discharged on Warfarin / Coumadin?   No         Depression / Suicide Risk    As you are discharged from this [facility] facility, it is important to learn how to keep safe from harming yourself.    Recognize the warning signs:  Abrupt changes in personality, positive or negative- including increase in energy   Giving away possessions  Change in eating patterns- significant weight changes-  positive or negative  Change in sleeping patterns- unable to sleep or sleeping all the time   Unwillingness or inability to communicate  Depression  Unusual sadness, discouragement and loneliness  Talk of wanting to die  Neglect of personal appearance   Rebelliousness- reckless behavior  Withdrawal from people/activities they love  Confusion- inability to concentrate     If you or a loved one observes any of these behaviors or has concerns about self-harm, here's what you can do:  Talk about it- your feelings and reasons for harming yourself  Remove any means that you might use to hurt yourself (examples: pills, rope, extension cords, firearm)  Get professional help from the community (Mental Health, Substance Abuse, psychological counseling)  Do not be  alone:Call your Safe Contact- someone whom you trust who will be there for you.  Call your local CRISIS HOTLINE 311-9092 or 768-427-2170  Call your local Children's Mobile Crisis Response Team [place] (127) 576-6290 or www.[place]  Call the toll free National Suicide Prevention Hotlines   National Suicide Prevention Lifeline 067-587-AIIH (8219)  [facility] Line Network 800-SUICIDE (282-4901)      Weakness  Weakness is a lack of strength. You may feel weak all over your body (generalized), or you may feel weak in one part of your body (focal). There are many potential causes of weakness. Sometimes, the cause of your weakness may not be known. Some causes of weakness can be serious, so it is important to see your doctor.  Follow these instructions at home:  Activity  Rest as needed.  Try to get enough sleep. Most adults need 7-8 hours of sleep each night. Talk to your doctor about how much sleep you need each night.  Do exercises, such as arm curls and leg raises, for 30 minutes at least 2 days a week or as told by your doctor.  Think about working with a physical therapist or  to help you get stronger.  General instructions    Take over-the-counter and prescription medicines only as told by your doctor.  Eat a healthy, well-balanced diet. This includes:  Proteins to build muscles, such as lean meats and fish.  Fresh fruits and vegetables.  Carbohydrates to boost energy, such as whole grains.  Drink enough fluid to keep your pee (urine) pale yellow.  Keep all follow-up visits as told by your doctor. This is important.  Contact a doctor if:  Your weakness does not get better or it gets worse.  Your weakness affects your ability to:  Think clearly.  Do your normal daily activities.  Get help right away if you:  Have sudden weakness on one side of your face or body.  Have chest pain.  Have trouble breathing or shortness of breath.  Have problems with your vision.  Have trouble talking or  swallowing.  Have trouble standing or walking.  Are light-headed.  Pass out (lose consciousness).  Summary  Weakness is a lack of strength. You may feel weak all over your body or just in one part of your body.  There are many potential causes of weakness. Sometimes, the cause of your weakness may not be known.  Rest as needed, and try to get enough sleep. Most adults need 7-8 hours of sleep each night.  Eat a healthy, well-balanced diet.  This information is not intended to replace advice given to you by your health care provider. Make sure you discuss any questions you have with your health care provider.  Document Released: 11/30/2009 Document Revised: 07/24/2019 Document Reviewed: 07/24/2019  [employer] Patient Education © 2020 [employer]    Acute Kidney Injury, Adult    Acute kidney injury is a sudden worsening of kidney function. The kidneys are organs that have several jobs. They filter the blood to remove waste products and extra fluid. They also maintain a healthy balance of minerals and hormones in the body, which helps control blood pressure and keep bones strong. With this condition, your kidneys do not do their jobs as well as they should.  This condition ranges from mild to severe. Over time it may develop into long-lasting (chronic) kidney disease. Early detection and treatment may prevent acute kidney injury from developing into a chronic condition.  What are the causes?  Common causes of this condition include:  A problem with blood flow to the kidneys. This may be caused by:  Low blood pressure (hypotension) or shock.  Blood loss.  Heart and blood vessel (cardiovascular) disease.  Severe burns.  Liver disease.  Direct damage to the kidneys. This may be caused by:  Certain medicines.  A kidney infection.  Poisoning.  Being around or in contact with toxic substances.  A surgical wound.  A hard, direct hit to the kidney area.  A sudden blockage of urine flow. This may be caused by:  Cancer.  Kidney  stones.  An enlarged prostate in males.  What are the signs or symptoms?  Symptoms of this condition may not be obvious until the condition becomes severe. Symptoms of this condition can include:  Tiredness (lethargy), or difficulty staying awake.  Nausea or vomiting.  Swelling (edema) of the face, legs, ankles, or feet.  Problems with urination, such as:  Abdominal pain, or pain along the side of your stomach (flank).  Decreased urine production.  Decrease in the force of urine flow.  Muscle twitches and cramps, especially in the legs.  Confusion or trouble concentrating.  Loss of appetite.  Fever.  How is this diagnosed?  This condition may be diagnosed with tests, including:  Blood tests.  Urine tests.  Imaging tests.  A test in which a sample of tissue is removed from the kidneys to be examined under a microscope (kidney biopsy).  How is this treated?  Treatment for this condition depends on the cause and how severe the condition is. In mild cases, treatment may not be needed. The kidneys may heal on their own. In more severe cases, treatment will involve:  Treating the cause of the kidney injury. This may involve changing any medicines you are taking or adjusting your dosage.  Fluids. You may need specialized IV fluids to balance your body's needs.  Having a catheter placed to drain urine and prevent blockages.  Preventing problems from occurring. This may mean avoiding certain medicines or procedures that can cause further injury to the kidneys.  In some cases treatment may also require:  A procedure to remove toxic wastes from the body (dialysis or continuous renal replacement therapy - CRRT).  Surgery. This may be done to repair a torn kidney, or to remove the blockage from the urinary system.  Follow these instructions at home:  Medicines  Take over-the-counter and prescription medicines only as told by your health care provider.  Do not take any new medicines without your health care provider's approval.  Many medicines can worsen your kidney damage.  Do not take any vitamin and mineral supplements without your health care provider's approval. Many nutritional supplements can worsen your kidney damage.  Lifestyle  If your health care provider prescribed changes to your diet, follow them. You may need to decrease the amount of protein you eat.  Achieve and maintain a healthy weight. If you need help with this, ask your health care provider.  Start or continue an exercise plan. Try to exercise at least 30 minutes a day, 5 days a week.  Do not use any tobacco products, such as cigarettes, chewing tobacco, and e-cigarettes. If you need help quitting, ask your health care provider.  General instructions  Keep track of your blood pressure. Report changes in your blood pressure as told by your health care provider.  Stay up to date with immunizations. Ask your health care provider which immunizations you need.  Keep all follow-up visits as told by your health care provider. This is important.  Where to find more information  American Association of Kidney Patients: www.aakp.org  National Kidney Foundation: www.kidney.org  American Kidney Fund: www.akfinc.org  [employer] [facility]:  www.lifeoptions.org  www.kidneyschool.org  Contact a health care provider if:  Your symptoms get worse.  You develop new symptoms.  Get help right away if:  You develop symptoms of worsening kidney disease, which include:  Headaches.  Abnormally dark or light skin.  Easy bruising.  Frequent hiccups.  Chest pain.  Shortness of breath.  End of menstruation in women.  Seizures.  Confusion or altered mental status.  Abdominal or back pain.  Itchiness.  You have a fever.  Your body is producing less urine.  You have pain or bleeding when you urinate.  Summary  Acute kidney injury is a sudden worsening of kidney function.  Acute kidney injury can be caused by problems with blood flow to the kidneys, direct damage to the kidneys, and  sudden blockage of urine flow.  Symptoms of this condition may not be obvious until it becomes severe. Symptoms may include edema, lethargy, confusion, nausea or vomiting, and problems passing urine.  This condition can usually be diagnosed with blood tests, urine tests, and imaging tests. Sometimes a kidney biopsy is done to diagnose this condition.  Treatment for this condition often involves treating the underlying cause. It is treated with fluids, medicines, dialysis, diet changes, or surgery.  This information is not intended to replace advice given to you by your health care provider. Make sure you discuss any questions you have with your health care provider.  Document Released: 07/02/2012 Document Revised: 11/30/2018 Document Reviewed: 12/08/2017  [employer] Patient Education © 2020 [employer].

## 2022-05-16 NOTE — H&P
Hospital Medicine History & Physical Note    Date of Service  5/15/2022    Primary Care Physician  Willis Gomez M.D.    Consultants  None    Code Status  No Order    Chief Complaint  Chief Complaint   Patient presents with   • Weakness     Patient for the last three days has been feeling generalized weakness and possibly confusion.       History of Presenting Illness  Mckenna Sewell is a 66 y.o. female who presented 5/15/2022 with generalized weakness for the last 2 or 3 days.  She reports feeling dizzy upon standing up.  Denies dysuria chest pain shortness of breath abdominal pain nausea vomiting diarrhea.    In the ED, found to have borderline hypotension.  Found to have sugar 271 and creatinine 1.82.  Chest x-ray showing no acute cardiopulmonary abnormality.    I discussed the plan of care with patient.    Review of Systems  Review of Systems   Constitutional: Positive for malaise/fatigue.   HENT: Negative.    Eyes: Negative.    Respiratory: Negative.    Cardiovascular: Negative.    Gastrointestinal: Negative.    Genitourinary: Negative.    Musculoskeletal: Negative.    Skin: Negative.    Neurological: Positive for weakness.   Endo/Heme/Allergies: Negative.    Psychiatric/Behavioral: Negative.        Past Medical History   has a past medical history of MAXIMILIANO (acute kidney injury) (HCC) (5/15/2022), Benign essential hypertension, CAD (coronary artery disease), CHF (congestive heart failure) (HCC), Congestive heart failure (HCC), COPD, COPD (chronic obstructive pulmonary disease) (HCC), Dilated cardiomyopathy (HCC), History of cardiac catheterization, Hypertension, Mixed hyperlipidemia, Nicotine dependence, Nicotine dependence, and Type 2 diabetes mellitus with complication (HCC).    Surgical History   has a past surgical history that includes other cardiac surgery and appendectomy.     Family History  family history includes Heart Attack in her maternal aunt; Heart Disease in her father, maternal aunt, maternal  uncle, and mother.   Family history reviewed with patient. There is no family history that is pertinent to the chief complaint.     Social History   reports that she has been smoking. She does not have any smokeless tobacco history on file. She reports that she does not drink alcohol and does not use drugs.    Allergies  Allergies   Allergen Reactions   • Advair Diskus      Rash/hives   • Codeine    • Lisinopril    • Sulfa Drugs        Medications  Prior to Admission Medications   Prescriptions Last Dose Informant Patient Reported? Taking?   Empagliflozin (JARDIANCE) 25 MG Tab   Yes Yes   Sig: Take 1 Tablet by mouth every day.   Ipratropium-Albuterol (COMBIVENT INH)   Yes No   Sig: Inhale  by mouth.   albuterol (VENTOLIN OR PROVENTIL) 108 (90 BASE) MCG/ACT Aero Soln inhalation aerosol   Yes No   Sig: Inhale 2 Puffs by mouth every 6 hours as needed for Shortness of Breath.   amlodipine (NORVASC) 5 MG Tab   No No   Sig: Take 1 Tab by mouth every day.   aspirin EC (ECOTRIN) 81 MG TBEC   Yes No   Sig: Take 81 mg by mouth every day.     carvedilol (COREG) 25 MG Tab   No No   Sig: TAKE ONE TABLET BY MOUTH TWICE DAILY WITH MEALS   cyclobenzaprine (FLEXERIL) 10 mg Tab   Yes Yes   Sig: Take 10 mg by mouth 3 times a day as needed.   famotidine (PEPCID) 20 MG Tab   Yes Yes   Sig: Take 20 mg by mouth in the morning and 20 mg in the evening.   fluticasone (FLOVENT HFA) 110 MCG/ACT Aerosol   Yes No   Sig: Inhale 2 Puffs by mouth 2 times a day.   furosemide (LASIX) 20 MG Tab   No No   Sig: TAKE ONE TABLET BY MOUTH EVERY 24 HOURS AS NEEDED   gabapentin (NEURONTIN) 300 MG Cap   Yes Yes   Sig: Take 300 mg by mouth in the morning and 300 mg at noon and 300 mg in the evening.   glipiZIDE (GLUCOTROL) 10 MG Tab   Yes Yes   Sig: Take 10 mg by mouth in the morning and 10 mg in the evening.   insulin aspart (NOVOLOG) 100 UNIT/ML Solution   Yes Yes   Sig: Inject  under the skin 3 times a day before meals.   linagliptin (TRADJENTA) 5 MG Tab  tablet   Yes Yes   Sig: Take 5 mg by mouth every day.   losartan (COZAAR) 50 MG Tab   No No   Sig: Take 1 Tab by mouth 2 Times a Day.   nicotine (NICODERM) 14 MG/24HR PATCH 24 HR   Yes Yes   Sig: Place 1 Patch on the skin every 24 hours.   simvastatin (ZOCOR) 20 MG Tab   No No   Sig: TAKE ONE TABLET BY MOUTH ONCE DAILY IN THE EVENING   tiotropium (SPIRIVA RESPIMAT) 2.5 mcg/Act Aero Soln   Yes Yes   Sig: Inhale 5 mcg every day.      Facility-Administered Medications: None       Physical Exam  Pulse:  [72-77] 72  Resp:  [12-18] 12  BP: (95-99)/(54-56) 95/54  SpO2:  [91 %-96 %] 96 %  Blood Pressure : (!) 95/54       Pulse: 72   Respiration: 12   Pulse Oximetry: 96 %       Physical Exam  Constitutional:       Appearance: Normal appearance. She is normal weight.   HENT:      Head: Normocephalic.      Nose: Nose normal.      Mouth/Throat:      Mouth: Mucous membranes are moist.   Eyes:      Pupils: Pupils are equal, round, and reactive to light.   Cardiovascular:      Rate and Rhythm: Normal rate and regular rhythm.   Pulmonary:      Effort: Pulmonary effort is normal.      Breath sounds: Normal breath sounds.   Abdominal:      General: Abdomen is flat. Bowel sounds are normal.      Palpations: Abdomen is soft.   Musculoskeletal:         General: Normal range of motion.      Cervical back: Normal range of motion.   Skin:     General: Skin is warm.   Neurological:      General: No focal deficit present.      Mental Status: She is alert and oriented to person, place, and time. Mental status is at baseline.   Psychiatric:         Mood and Affect: Mood normal.         Behavior: Behavior normal.         Thought Content: Thought content normal.         Judgment: Judgment normal.         Laboratory:  Recent Labs     05/15/22  1515   WBC 6.9   RBC 4.36   HEMOGLOBIN 11.2*   HEMATOCRIT 35.1*   MCV 80.5*   MCH 25.7*   MCHC 31.9*   RDW 44.4   PLATELETCT 173   MPV 9.4     Recent Labs     05/15/22  1515   SODIUM 135   POTASSIUM 3.6    CHLORIDE 101   CO2 20   GLUCOSE 271*   BUN 28*   CREATININE 1.82*   CALCIUM 8.8     Recent Labs     05/15/22  1515   ALTSGPT 36   ASTSGOT 35   ALKPHOSPHAT 81   TBILIRUBIN 0.3   GLUCOSE 271*         No results for input(s): NTPROBNP in the last 72 hours.      No results for input(s): TROPONINT in the last 72 hours.    Imaging:  DX-CHEST-PORTABLE (1 VIEW)   Final Result      No acute cardiopulmonary abnormality identified.          no X-Ray or EKG requiring interpretation    Assessment/Plan:  Justification for Admission Status  I anticipate this patient is appropriate for observation status at this time because Observation    MAXIMILIANO (acute kidney injury) (HCC)  Assessment & Plan  Noted to have MAXIMILIANO with elevated BUN  Fluids  Serial BMP    Generalized weakness  Assessment & Plan  Possibly due to from borderline hypotension versus UTI  Await UA results  Optimize blood pressure  PT OT    Type 2 diabetes mellitus with complication (HCC)- (present on admission)  Assessment & Plan  Sliding scale     Mixed hyperlipidemia- (present on admission)  Assessment & Plan  Continue zocor     Benign essential hypertension- (present on admission)  Assessment & Plan  Hold blood pressure medications for now due to borderline hypotension in the ED        VTE prophylaxis: heparin ppx

## 2022-05-18 LAB
BACTERIA UR CULT: NORMAL
SIGNIFICANT IND 70042: NORMAL
SITE SITE: NORMAL
SOURCE SOURCE: NORMAL

## 2022-05-20 LAB
BACTERIA BLD CULT: NORMAL
BACTERIA BLD CULT: NORMAL
SIGNIFICANT IND 70042: NORMAL
SIGNIFICANT IND 70042: NORMAL
SITE SITE: NORMAL
SITE SITE: NORMAL
SOURCE SOURCE: NORMAL
SOURCE SOURCE: NORMAL

## 2022-07-06 ENCOUNTER — APPOINTMENT (OUTPATIENT)
Dept: RADIOLOGY | Facility: MEDICAL CENTER | Age: 67
DRG: 481 | End: 2022-07-06
Attending: ORTHOPAEDIC SURGERY
Payer: MEDICARE

## 2022-07-06 ENCOUNTER — APPOINTMENT (OUTPATIENT)
Dept: RADIOLOGY | Facility: MEDICAL CENTER | Age: 67
DRG: 481 | End: 2022-07-06
Attending: EMERGENCY MEDICINE
Payer: MEDICARE

## 2022-07-06 ENCOUNTER — ANESTHESIA EVENT (OUTPATIENT)
Dept: SURGERY | Facility: MEDICAL CENTER | Age: 67
DRG: 481 | End: 2022-07-06
Payer: MEDICARE

## 2022-07-06 ENCOUNTER — ANESTHESIA (OUTPATIENT)
Dept: SURGERY | Facility: MEDICAL CENTER | Age: 67
DRG: 481 | End: 2022-07-06
Payer: MEDICARE

## 2022-07-06 ENCOUNTER — HOSPITAL ENCOUNTER (INPATIENT)
Facility: MEDICAL CENTER | Age: 67
LOS: 7 days | DRG: 481 | End: 2022-07-13
Attending: EMERGENCY MEDICINE | Admitting: STUDENT IN AN ORGANIZED HEALTH CARE EDUCATION/TRAINING PROGRAM
Payer: MEDICARE

## 2022-07-06 DIAGNOSIS — R42 DIZZINESS: ICD-10-CM

## 2022-07-06 DIAGNOSIS — S72.8X1A OTHER FRACTURE OF RIGHT FEMUR, INITIAL ENCOUNTER FOR CLOSED FRACTURE (HCC): ICD-10-CM

## 2022-07-06 DIAGNOSIS — S72.21XA CLOSED DISPLACED SUBTROCHANTERIC FRACTURE OF RIGHT FEMUR, INITIAL ENCOUNTER (HCC): ICD-10-CM

## 2022-07-06 DIAGNOSIS — Z95.810 BIVENTRICULAR ICD (IMPLANTABLE CARDIOVERTER-DEFIBRILLATOR) IN PLACE: Chronic | ICD-10-CM

## 2022-07-06 DIAGNOSIS — D64.9 ANEMIA, UNSPECIFIED TYPE: ICD-10-CM

## 2022-07-06 DIAGNOSIS — F17.219 CIGARETTE NICOTINE DEPENDENCE WITH NICOTINE-INDUCED DISORDER: Chronic | ICD-10-CM

## 2022-07-06 DIAGNOSIS — S72.001A CLOSED FRACTURE OF NECK OF RIGHT FEMUR, INITIAL ENCOUNTER (HCC): ICD-10-CM

## 2022-07-06 PROBLEM — S72.90XA FEMUR FRACTURE (HCC): Status: ACTIVE | Noted: 2022-07-06

## 2022-07-06 PROBLEM — F15.10 METHAMPHETAMINE ABUSE (HCC): Status: ACTIVE | Noted: 2022-07-06

## 2022-07-06 LAB
ALBUMIN SERPL BCP-MCNC: 3.5 G/DL (ref 3.2–4.9)
ALBUMIN/GLOB SERPL: 1.2 G/DL
ALP SERPL-CCNC: 69 U/L (ref 30–99)
ALT SERPL-CCNC: 19 U/L (ref 2–50)
ANION GAP SERPL CALC-SCNC: 14 MMOL/L (ref 7–16)
AST SERPL-CCNC: 20 U/L (ref 12–45)
BASOPHILS # BLD AUTO: 0.6 % (ref 0–1.8)
BASOPHILS # BLD: 0.04 K/UL (ref 0–0.12)
BILIRUB SERPL-MCNC: 0.2 MG/DL (ref 0.1–1.5)
BUN SERPL-MCNC: 33 MG/DL (ref 8–22)
CALCIUM SERPL-MCNC: 8.6 MG/DL (ref 8.5–10.5)
CHLORIDE SERPL-SCNC: 106 MMOL/L (ref 96–112)
CO2 SERPL-SCNC: 20 MMOL/L (ref 20–33)
CREAT SERPL-MCNC: 1.6 MG/DL (ref 0.5–1.4)
EKG IMPRESSION: NORMAL
EOSINOPHIL # BLD AUTO: 0.33 K/UL (ref 0–0.51)
EOSINOPHIL NFR BLD: 4.6 % (ref 0–6.9)
ERYTHROCYTE [DISTWIDTH] IN BLOOD BY AUTOMATED COUNT: 43.4 FL (ref 35.9–50)
GFR SERPLBLD CREATININE-BSD FMLA CKD-EPI: 35 ML/MIN/1.73 M 2
GLOBULIN SER CALC-MCNC: 2.9 G/DL (ref 1.9–3.5)
GLUCOSE SERPL-MCNC: 164 MG/DL (ref 65–99)
HCT VFR BLD AUTO: 29.7 % (ref 37–47)
HGB BLD-MCNC: 9.2 G/DL (ref 12–16)
IMM GRANULOCYTES # BLD AUTO: 0.03 K/UL (ref 0–0.11)
IMM GRANULOCYTES NFR BLD AUTO: 0.4 % (ref 0–0.9)
LYMPHOCYTES # BLD AUTO: 1.94 K/UL (ref 1–4.8)
LYMPHOCYTES NFR BLD: 26.8 % (ref 22–41)
MCH RBC QN AUTO: 24.4 PG (ref 27–33)
MCHC RBC AUTO-ENTMCNC: 31 G/DL (ref 33.6–35)
MCV RBC AUTO: 78.8 FL (ref 81.4–97.8)
MONOCYTES # BLD AUTO: 0.67 K/UL (ref 0–0.85)
MONOCYTES NFR BLD AUTO: 9.3 % (ref 0–13.4)
NEUTROPHILS # BLD AUTO: 4.22 K/UL (ref 2–7.15)
NEUTROPHILS NFR BLD: 58.3 % (ref 44–72)
NRBC # BLD AUTO: 0 K/UL
NRBC BLD-RTO: 0 /100 WBC
PLATELET # BLD AUTO: 175 K/UL (ref 164–446)
PMV BLD AUTO: 9.4 FL (ref 9–12.9)
POTASSIUM SERPL-SCNC: 3.7 MMOL/L (ref 3.6–5.5)
PROT SERPL-MCNC: 6.4 G/DL (ref 6–8.2)
RBC # BLD AUTO: 3.77 M/UL (ref 4.2–5.4)
SODIUM SERPL-SCNC: 140 MMOL/L (ref 135–145)
WBC # BLD AUTO: 7.2 K/UL (ref 4.8–10.8)

## 2022-07-06 PROCEDURE — 96374 THER/PROPH/DIAG INJ IV PUSH: CPT

## 2022-07-06 PROCEDURE — 160029 HCHG SURGERY MINUTES - 1ST 30 MINS LEVEL 4: Performed by: ORTHOPAEDIC SURGERY

## 2022-07-06 PROCEDURE — 700105 HCHG RX REV CODE 258: Performed by: ANESTHESIOLOGY

## 2022-07-06 PROCEDURE — 770001 HCHG ROOM/CARE - MED/SURG/GYN PRIV*

## 2022-07-06 PROCEDURE — 27245 TREAT THIGH FRACTURE: CPT | Mod: RT | Performed by: ORTHOPAEDIC SURGERY

## 2022-07-06 PROCEDURE — 160002 HCHG RECOVERY MINUTES (STAT): Performed by: ORTHOPAEDIC SURGERY

## 2022-07-06 PROCEDURE — 73552 X-RAY EXAM OF FEMUR 2/>: CPT | Mod: RT

## 2022-07-06 PROCEDURE — 160041 HCHG SURGERY MINUTES - EA ADDL 1 MIN LEVEL 4: Performed by: ORTHOPAEDIC SURGERY

## 2022-07-06 PROCEDURE — 93005 ELECTROCARDIOGRAM TRACING: CPT

## 2022-07-06 PROCEDURE — 160035 HCHG PACU - 1ST 60 MINS PHASE I: Performed by: ORTHOPAEDIC SURGERY

## 2022-07-06 PROCEDURE — 96375 TX/PRO/DX INJ NEW DRUG ADDON: CPT

## 2022-07-06 PROCEDURE — 80053 COMPREHEN METABOLIC PANEL: CPT

## 2022-07-06 PROCEDURE — 85025 COMPLETE CBC W/AUTO DIFF WBC: CPT

## 2022-07-06 PROCEDURE — 160048 HCHG OR STATISTICAL LEVEL 1-5: Performed by: ORTHOPAEDIC SURGERY

## 2022-07-06 PROCEDURE — 700111 HCHG RX REV CODE 636 W/ 250 OVERRIDE (IP): Performed by: ANESTHESIOLOGY

## 2022-07-06 PROCEDURE — 160009 HCHG ANES TIME/MIN: Performed by: ORTHOPAEDIC SURGERY

## 2022-07-06 PROCEDURE — C1713 ANCHOR/SCREW BN/BN,TIS/BN: HCPCS | Performed by: ORTHOPAEDIC SURGERY

## 2022-07-06 PROCEDURE — 99291 CRITICAL CARE FIRST HOUR: CPT

## 2022-07-06 PROCEDURE — 160036 HCHG PACU - EA ADDL 30 MINS PHASE I: Performed by: ORTHOPAEDIC SURGERY

## 2022-07-06 PROCEDURE — 01230 ANES OPN UPPER 2/3 FEMUR NOS: CPT | Performed by: ANESTHESIOLOGY

## 2022-07-06 PROCEDURE — 72170 X-RAY EXAM OF PELVIS: CPT

## 2022-07-06 PROCEDURE — 94760 N-INVAS EAR/PLS OXIMETRY 1: CPT

## 2022-07-06 PROCEDURE — 99223 1ST HOSP IP/OBS HIGH 75: CPT | Mod: AI | Performed by: STUDENT IN AN ORGANIZED HEALTH CARE EDUCATION/TRAINING PROGRAM

## 2022-07-06 PROCEDURE — 36415 COLL VENOUS BLD VENIPUNCTURE: CPT

## 2022-07-06 PROCEDURE — 0QS636Z REPOSITION RIGHT UPPER FEMUR WITH INTRAMEDULLARY INTERNAL FIXATION DEVICE, PERCUTANEOUS APPROACH: ICD-10-PCS | Performed by: ORTHOPAEDIC SURGERY

## 2022-07-06 PROCEDURE — 99223 1ST HOSP IP/OBS HIGH 75: CPT | Mod: 57 | Performed by: ORTHOPAEDIC SURGERY

## 2022-07-06 PROCEDURE — 93005 ELECTROCARDIOGRAM TRACING: CPT | Performed by: EMERGENCY MEDICINE

## 2022-07-06 PROCEDURE — 700111 HCHG RX REV CODE 636 W/ 250 OVERRIDE (IP): Performed by: EMERGENCY MEDICINE

## 2022-07-06 DEVICE — SCREW CROSS LOCK 5MM X 37.5MM (4TX5=20): Type: IMPLANTABLE DEVICE | Site: FEMUR | Status: FUNCTIONAL

## 2022-07-06 DEVICE — SCREW LAG 10.5MM X 100MM (4TX2=8): Type: IMPLANTABLE DEVICE | Site: FEMUR | Status: FUNCTIONAL

## 2022-07-06 DEVICE — NAIL HIP 127.5 DEGREE 11MM X 360MM RT (4TX1=4): Type: IMPLANTABLE DEVICE | Site: FEMUR | Status: FUNCTIONAL

## 2022-07-06 DEVICE — SCREW CROSS LOCK 5MM X 45MM (4TX5=20): Type: IMPLANTABLE DEVICE | Site: FEMUR | Status: FUNCTIONAL

## 2022-07-06 RX ORDER — ACETAMINOPHEN 500 MG
1000 TABLET ORAL EVERY 4 HOURS PRN
Status: DISCONTINUED | OUTPATIENT
Start: 2022-07-06 | End: 2022-07-07 | Stop reason: HOSPADM

## 2022-07-06 RX ORDER — SODIUM CHLORIDE, SODIUM LACTATE, POTASSIUM CHLORIDE, CALCIUM CHLORIDE 600; 310; 30; 20 MG/100ML; MG/100ML; MG/100ML; MG/100ML
INJECTION, SOLUTION INTRAVENOUS
Status: DISCONTINUED | OUTPATIENT
Start: 2022-07-06 | End: 2022-07-06 | Stop reason: SURG

## 2022-07-06 RX ORDER — ALBUTEROL SULFATE 2.5 MG/3ML
2.5 SOLUTION RESPIRATORY (INHALATION)
Status: DISCONTINUED | OUTPATIENT
Start: 2022-07-06 | End: 2022-07-07 | Stop reason: HOSPADM

## 2022-07-06 RX ORDER — SODIUM CHLORIDE 9 MG/ML
INJECTION, SOLUTION INTRAVENOUS CONTINUOUS
Status: DISCONTINUED | OUTPATIENT
Start: 2022-07-06 | End: 2022-07-07

## 2022-07-06 RX ORDER — DEXAMETHASONE SODIUM PHOSPHATE 4 MG/ML
INJECTION, SOLUTION INTRA-ARTICULAR; INTRALESIONAL; INTRAMUSCULAR; INTRAVENOUS; SOFT TISSUE PRN
Status: DISCONTINUED | OUTPATIENT
Start: 2022-07-06 | End: 2022-07-06 | Stop reason: SURG

## 2022-07-06 RX ORDER — HYDROMORPHONE HYDROCHLORIDE 1 MG/ML
0.4 INJECTION, SOLUTION INTRAMUSCULAR; INTRAVENOUS; SUBCUTANEOUS
Status: DISCONTINUED | OUTPATIENT
Start: 2022-07-06 | End: 2022-07-07 | Stop reason: HOSPADM

## 2022-07-06 RX ORDER — DIPHENHYDRAMINE HYDROCHLORIDE 50 MG/ML
12.5 INJECTION INTRAMUSCULAR; INTRAVENOUS
Status: DISCONTINUED | OUTPATIENT
Start: 2022-07-06 | End: 2022-07-07 | Stop reason: HOSPADM

## 2022-07-06 RX ORDER — CEFAZOLIN SODIUM 1 G/3ML
INJECTION, POWDER, FOR SOLUTION INTRAMUSCULAR; INTRAVENOUS PRN
Status: DISCONTINUED | OUTPATIENT
Start: 2022-07-06 | End: 2022-07-06 | Stop reason: SURG

## 2022-07-06 RX ORDER — OXYCODONE HCL 5 MG/5 ML
10 SOLUTION, ORAL ORAL
Status: COMPLETED | OUTPATIENT
Start: 2022-07-06 | End: 2022-07-07

## 2022-07-06 RX ORDER — GABAPENTIN 300 MG/1
600-900 CAPSULE ORAL
Status: DISCONTINUED | OUTPATIENT
Start: 2022-07-06 | End: 2022-07-07

## 2022-07-06 RX ORDER — ALBUTEROL SULFATE 90 UG/1
2 AEROSOL, METERED RESPIRATORY (INHALATION) EVERY 6 HOURS PRN
Status: DISCONTINUED | OUTPATIENT
Start: 2022-07-06 | End: 2022-07-09

## 2022-07-06 RX ORDER — SIMVASTATIN 40 MG
20 TABLET ORAL EVERY EVENING
Status: DISCONTINUED | OUTPATIENT
Start: 2022-07-06 | End: 2022-07-07

## 2022-07-06 RX ORDER — HYDROMORPHONE HYDROCHLORIDE 1 MG/ML
0.1 INJECTION, SOLUTION INTRAMUSCULAR; INTRAVENOUS; SUBCUTANEOUS
Status: DISCONTINUED | OUTPATIENT
Start: 2022-07-06 | End: 2022-07-07 | Stop reason: HOSPADM

## 2022-07-06 RX ORDER — MEPERIDINE HYDROCHLORIDE 25 MG/ML
6.25 INJECTION INTRAMUSCULAR; INTRAVENOUS; SUBCUTANEOUS
Status: DISCONTINUED | OUTPATIENT
Start: 2022-07-06 | End: 2022-07-07 | Stop reason: HOSPADM

## 2022-07-06 RX ORDER — HYDROMORPHONE HYDROCHLORIDE 1 MG/ML
1 INJECTION, SOLUTION INTRAMUSCULAR; INTRAVENOUS; SUBCUTANEOUS ONCE
Status: COMPLETED | OUTPATIENT
Start: 2022-07-06 | End: 2022-07-06

## 2022-07-06 RX ORDER — ONDANSETRON 2 MG/ML
4 INJECTION INTRAMUSCULAR; INTRAVENOUS ONCE
Status: COMPLETED | OUTPATIENT
Start: 2022-07-06 | End: 2022-07-06

## 2022-07-06 RX ORDER — CARVEDILOL 6.25 MG/1
25 TABLET ORAL 2 TIMES DAILY WITH MEALS
Status: DISCONTINUED | OUTPATIENT
Start: 2022-07-06 | End: 2022-07-13 | Stop reason: HOSPADM

## 2022-07-06 RX ORDER — HYDROMORPHONE HYDROCHLORIDE 1 MG/ML
0.2 INJECTION, SOLUTION INTRAMUSCULAR; INTRAVENOUS; SUBCUTANEOUS
Status: DISCONTINUED | OUTPATIENT
Start: 2022-07-06 | End: 2022-07-07 | Stop reason: HOSPADM

## 2022-07-06 RX ORDER — OXYCODONE HCL 5 MG/5 ML
5 SOLUTION, ORAL ORAL
Status: COMPLETED | OUTPATIENT
Start: 2022-07-06 | End: 2022-07-07

## 2022-07-06 RX ORDER — ONDANSETRON 2 MG/ML
INJECTION INTRAMUSCULAR; INTRAVENOUS PRN
Status: DISCONTINUED | OUTPATIENT
Start: 2022-07-06 | End: 2022-07-06 | Stop reason: SURG

## 2022-07-06 RX ORDER — HEPARIN SODIUM 5000 [USP'U]/ML
5000 INJECTION, SOLUTION INTRAVENOUS; SUBCUTANEOUS EVERY 8 HOURS
Status: DISCONTINUED | OUTPATIENT
Start: 2022-07-07 | End: 2022-07-07

## 2022-07-06 RX ORDER — PHENYLEPHRINE HCL IN 0.9% NACL 0.5 MG/5ML
SYRINGE (ML) INTRAVENOUS PRN
Status: DISCONTINUED | OUTPATIENT
Start: 2022-07-06 | End: 2022-07-06 | Stop reason: SURG

## 2022-07-06 RX ORDER — ETOMIDATE 2 MG/ML
INJECTION INTRAVENOUS PRN
Status: DISCONTINUED | OUTPATIENT
Start: 2022-07-06 | End: 2022-07-06 | Stop reason: SURG

## 2022-07-06 RX ORDER — HYDROMORPHONE HYDROCHLORIDE 1 MG/ML
0.5 INJECTION, SOLUTION INTRAMUSCULAR; INTRAVENOUS; SUBCUTANEOUS EVERY 4 HOURS PRN
Status: DISCONTINUED | OUTPATIENT
Start: 2022-07-06 | End: 2022-07-13 | Stop reason: HOSPADM

## 2022-07-06 RX ORDER — MIDAZOLAM HYDROCHLORIDE 1 MG/ML
INJECTION INTRAMUSCULAR; INTRAVENOUS PRN
Status: DISCONTINUED | OUTPATIENT
Start: 2022-07-06 | End: 2022-07-06 | Stop reason: SURG

## 2022-07-06 RX ORDER — ONDANSETRON 2 MG/ML
4 INJECTION INTRAMUSCULAR; INTRAVENOUS
Status: DISCONTINUED | OUTPATIENT
Start: 2022-07-06 | End: 2022-07-07 | Stop reason: HOSPADM

## 2022-07-06 RX ORDER — HALOPERIDOL 5 MG/ML
1 INJECTION INTRAMUSCULAR
Status: DISCONTINUED | OUTPATIENT
Start: 2022-07-06 | End: 2022-07-07 | Stop reason: HOSPADM

## 2022-07-06 RX ADMIN — ONDANSETRON 4 MG: 2 INJECTION INTRAMUSCULAR; INTRAVENOUS at 22:03

## 2022-07-06 RX ADMIN — MIDAZOLAM HYDROCHLORIDE 2 MG: 1 INJECTION, SOLUTION INTRAMUSCULAR; INTRAVENOUS at 21:43

## 2022-07-06 RX ADMIN — SODIUM CHLORIDE, POTASSIUM CHLORIDE, SODIUM LACTATE AND CALCIUM CHLORIDE: 600; 310; 30; 20 INJECTION, SOLUTION INTRAVENOUS at 21:40

## 2022-07-06 RX ADMIN — HYDROMORPHONE HYDROCHLORIDE 1 MG: 1 INJECTION, SOLUTION INTRAMUSCULAR; INTRAVENOUS; SUBCUTANEOUS at 19:34

## 2022-07-06 RX ADMIN — ETOMIDATE 10 MG: 2 INJECTION INTRAVENOUS at 21:44

## 2022-07-06 RX ADMIN — Medication 200 MCG: at 22:10

## 2022-07-06 RX ADMIN — CEFAZOLIN 2 G: 330 INJECTION, POWDER, FOR SOLUTION INTRAMUSCULAR; INTRAVENOUS at 21:52

## 2022-07-06 RX ADMIN — DEXAMETHASONE SODIUM PHOSPHATE 4 MG: 4 INJECTION, SOLUTION INTRA-ARTICULAR; INTRALESIONAL; INTRAMUSCULAR; INTRAVENOUS; SOFT TISSUE at 22:03

## 2022-07-06 RX ADMIN — ONDANSETRON 4 MG: 2 INJECTION INTRAMUSCULAR; INTRAVENOUS at 19:34

## 2022-07-06 RX ADMIN — FENTANYL CITRATE 50 MCG: 50 INJECTION, SOLUTION INTRAMUSCULAR; INTRAVENOUS at 22:02

## 2022-07-06 RX ADMIN — FENTANYL CITRATE 100 MCG: 50 INJECTION, SOLUTION INTRAMUSCULAR; INTRAVENOUS at 21:44

## 2022-07-06 ASSESSMENT — ENCOUNTER SYMPTOMS
PSYCHIATRIC NEGATIVE: 1
RESPIRATORY NEGATIVE: 1
GASTROINTESTINAL NEGATIVE: 1
CARDIOVASCULAR NEGATIVE: 1
FALLS: 1
NEUROLOGICAL NEGATIVE: 1
EYES NEGATIVE: 1

## 2022-07-06 ASSESSMENT — FIBROSIS 4 INDEX: FIB4 SCORE: 2.29

## 2022-07-06 ASSESSMENT — LIFESTYLE VARIABLES: DO YOU DRINK ALCOHOL: NO

## 2022-07-06 ASSESSMENT — PAIN SCALES - GENERAL: PAIN_LEVEL: 0

## 2022-07-07 ENCOUNTER — HOSPITAL ENCOUNTER (INPATIENT)
Facility: REHABILITATION | Age: 67
End: 2022-07-07
Attending: PHYSICAL MEDICINE & REHABILITATION | Admitting: PHYSICAL MEDICINE & REHABILITATION
Payer: MEDICARE

## 2022-07-07 LAB
AMPHET UR QL SCN: POSITIVE
ANION GAP SERPL CALC-SCNC: 10 MMOL/L (ref 7–16)
APPEARANCE UR: CLEAR
BARBITURATES UR QL SCN: NEGATIVE
BENZODIAZ UR QL SCN: POSITIVE
BILIRUB UR QL STRIP.AUTO: NEGATIVE
BUN SERPL-MCNC: 26 MG/DL (ref 8–22)
BZE UR QL SCN: NEGATIVE
CALCIUM SERPL-MCNC: 8.7 MG/DL (ref 8.5–10.5)
CANNABINOIDS UR QL SCN: NEGATIVE
CHLORIDE SERPL-SCNC: 104 MMOL/L (ref 96–112)
CO2 SERPL-SCNC: 22 MMOL/L (ref 20–33)
COLOR UR: YELLOW
CREAT SERPL-MCNC: 1.17 MG/DL (ref 0.5–1.4)
EST. AVERAGE GLUCOSE BLD GHB EST-MCNC: 166 MG/DL
FERRITIN SERPL-MCNC: 27.7 NG/ML (ref 10–291)
GFR SERPLBLD CREATININE-BSD FMLA CKD-EPI: 51 ML/MIN/1.73 M 2
GLUCOSE BLD STRIP.AUTO-MCNC: 155 MG/DL (ref 65–99)
GLUCOSE BLD STRIP.AUTO-MCNC: 193 MG/DL (ref 65–99)
GLUCOSE BLD STRIP.AUTO-MCNC: 203 MG/DL (ref 65–99)
GLUCOSE BLD STRIP.AUTO-MCNC: 206 MG/DL (ref 65–99)
GLUCOSE BLD STRIP.AUTO-MCNC: 244 MG/DL (ref 65–99)
GLUCOSE SERPL-MCNC: 215 MG/DL (ref 65–99)
GLUCOSE UR STRIP.AUTO-MCNC: 500 MG/DL
HBA1C MFR BLD: 7.4 % (ref 4–5.6)
IRON SATN MFR SERPL: 6 % (ref 15–55)
IRON SERPL-MCNC: 21 UG/DL (ref 40–170)
KETONES UR STRIP.AUTO-MCNC: NEGATIVE MG/DL
LEUKOCYTE ESTERASE UR QL STRIP.AUTO: NEGATIVE
METHADONE UR QL SCN: NEGATIVE
MICRO URNS: ABNORMAL
NITRITE UR QL STRIP.AUTO: NEGATIVE
OPIATES UR QL SCN: NEGATIVE
OXYCODONE UR QL SCN: NEGATIVE
PCP UR QL SCN: NEGATIVE
PH UR STRIP.AUTO: 5.5 [PH] (ref 5–8)
POTASSIUM SERPL-SCNC: 3.9 MMOL/L (ref 3.6–5.5)
PROPOXYPH UR QL SCN: NEGATIVE
PROT UR QL STRIP: NEGATIVE MG/DL
RBC UR QL AUTO: NEGATIVE
SODIUM SERPL-SCNC: 136 MMOL/L (ref 135–145)
SP GR UR STRIP.AUTO: 1.02
T4 FREE SERPL-MCNC: 1.3 NG/DL (ref 0.93–1.7)
TIBC SERPL-MCNC: 371 UG/DL (ref 250–450)
TSH SERPL DL<=0.005 MIU/L-ACNC: 0.08 UIU/ML (ref 0.38–5.33)
UIBC SERPL-MCNC: 350 UG/DL (ref 110–370)
UROBILINOGEN UR STRIP.AUTO-MCNC: 0.2 MG/DL

## 2022-07-07 PROCEDURE — 97166 OT EVAL MOD COMPLEX 45 MIN: CPT

## 2022-07-07 PROCEDURE — 82962 GLUCOSE BLOOD TEST: CPT

## 2022-07-07 PROCEDURE — 36415 COLL VENOUS BLD VENIPUNCTURE: CPT

## 2022-07-07 PROCEDURE — 99024 POSTOP FOLLOW-UP VISIT: CPT | Performed by: ORTHOPAEDIC SURGERY

## 2022-07-07 PROCEDURE — 84439 ASSAY OF FREE THYROXINE: CPT

## 2022-07-07 PROCEDURE — 700111 HCHG RX REV CODE 636 W/ 250 OVERRIDE (IP): Performed by: ANESTHESIOLOGY

## 2022-07-07 PROCEDURE — 700102 HCHG RX REV CODE 250 W/ 637 OVERRIDE(OP)

## 2022-07-07 PROCEDURE — A9270 NON-COVERED ITEM OR SERVICE: HCPCS | Performed by: HOSPITALIST

## 2022-07-07 PROCEDURE — 770001 HCHG ROOM/CARE - MED/SURG/GYN PRIV*

## 2022-07-07 PROCEDURE — 700102 HCHG RX REV CODE 250 W/ 637 OVERRIDE(OP): Performed by: ORTHOPAEDIC SURGERY

## 2022-07-07 PROCEDURE — 83036 HEMOGLOBIN GLYCOSYLATED A1C: CPT

## 2022-07-07 PROCEDURE — A9270 NON-COVERED ITEM OR SERVICE: HCPCS | Performed by: ORTHOPAEDIC SURGERY

## 2022-07-07 PROCEDURE — 99232 SBSQ HOSP IP/OBS MODERATE 35: CPT | Performed by: INTERNAL MEDICINE

## 2022-07-07 PROCEDURE — 700102 HCHG RX REV CODE 250 W/ 637 OVERRIDE(OP): Performed by: STUDENT IN AN ORGANIZED HEALTH CARE EDUCATION/TRAINING PROGRAM

## 2022-07-07 PROCEDURE — A9270 NON-COVERED ITEM OR SERVICE: HCPCS | Performed by: ANESTHESIOLOGY

## 2022-07-07 PROCEDURE — 700111 HCHG RX REV CODE 636 W/ 250 OVERRIDE (IP): Performed by: ORTHOPAEDIC SURGERY

## 2022-07-07 PROCEDURE — 700102 HCHG RX REV CODE 250 W/ 637 OVERRIDE(OP): Performed by: ANESTHESIOLOGY

## 2022-07-07 PROCEDURE — A9270 NON-COVERED ITEM OR SERVICE: HCPCS | Performed by: STUDENT IN AN ORGANIZED HEALTH CARE EDUCATION/TRAINING PROGRAM

## 2022-07-07 PROCEDURE — 82728 ASSAY OF FERRITIN: CPT

## 2022-07-07 PROCEDURE — 700105 HCHG RX REV CODE 258: Performed by: STUDENT IN AN ORGANIZED HEALTH CARE EDUCATION/TRAINING PROGRAM

## 2022-07-07 PROCEDURE — 97162 PT EVAL MOD COMPLEX 30 MIN: CPT

## 2022-07-07 PROCEDURE — 80307 DRUG TEST PRSMV CHEM ANLYZR: CPT

## 2022-07-07 PROCEDURE — 80048 BASIC METABOLIC PNL TOTAL CA: CPT

## 2022-07-07 PROCEDURE — 84443 ASSAY THYROID STIM HORMONE: CPT

## 2022-07-07 PROCEDURE — 83540 ASSAY OF IRON: CPT

## 2022-07-07 PROCEDURE — 81003 URINALYSIS AUTO W/O SCOPE: CPT

## 2022-07-07 PROCEDURE — 700102 HCHG RX REV CODE 250 W/ 637 OVERRIDE(OP): Performed by: HOSPITALIST

## 2022-07-07 PROCEDURE — 83550 IRON BINDING TEST: CPT

## 2022-07-07 RX ORDER — DIPHENHYDRAMINE HYDROCHLORIDE 50 MG/ML
25 INJECTION INTRAMUSCULAR; INTRAVENOUS EVERY 6 HOURS PRN
Status: DISCONTINUED | OUTPATIENT
Start: 2022-07-07 | End: 2022-07-07

## 2022-07-07 RX ORDER — ACETAMINOPHEN 325 MG/1
650 TABLET ORAL EVERY 6 HOURS PRN
Status: DISCONTINUED | OUTPATIENT
Start: 2022-07-12 | End: 2022-07-13 | Stop reason: HOSPADM

## 2022-07-07 RX ORDER — ACETAMINOPHEN 325 MG/1
650 TABLET ORAL EVERY 6 HOURS PRN
Status: DISCONTINUED | OUTPATIENT
Start: 2022-07-12 | End: 2022-07-07

## 2022-07-07 RX ORDER — OXYCODONE HYDROCHLORIDE 5 MG/1
5 TABLET ORAL
Status: DISCONTINUED | OUTPATIENT
Start: 2022-07-07 | End: 2022-07-13 | Stop reason: HOSPADM

## 2022-07-07 RX ORDER — POLYETHYLENE GLYCOL 3350 17 G/17G
1 POWDER, FOR SOLUTION ORAL 2 TIMES DAILY PRN
Status: DISCONTINUED | OUTPATIENT
Start: 2022-07-07 | End: 2022-07-13 | Stop reason: HOSPADM

## 2022-07-07 RX ORDER — OXYCODONE HYDROCHLORIDE 10 MG/1
10 TABLET ORAL
Status: DISCONTINUED | OUTPATIENT
Start: 2022-07-07 | End: 2022-07-13 | Stop reason: HOSPADM

## 2022-07-07 RX ORDER — LORAZEPAM 0.5 MG/1
0.5 TABLET ORAL ONCE
Status: COMPLETED | OUTPATIENT
Start: 2022-07-07 | End: 2022-07-07

## 2022-07-07 RX ORDER — HYDROMORPHONE HYDROCHLORIDE 1 MG/ML
0.5 INJECTION, SOLUTION INTRAMUSCULAR; INTRAVENOUS; SUBCUTANEOUS
Status: DISCONTINUED | OUTPATIENT
Start: 2022-07-07 | End: 2022-07-13 | Stop reason: HOSPADM

## 2022-07-07 RX ORDER — DEXTROSE MONOHYDRATE 25 G/50ML
25 INJECTION, SOLUTION INTRAVENOUS
Status: DISCONTINUED | OUTPATIENT
Start: 2022-07-07 | End: 2022-07-13 | Stop reason: HOSPADM

## 2022-07-07 RX ORDER — DOCUSATE SODIUM 100 MG/1
100 CAPSULE, LIQUID FILLED ORAL 2 TIMES DAILY
Status: DISCONTINUED | OUTPATIENT
Start: 2022-07-07 | End: 2022-07-13 | Stop reason: HOSPADM

## 2022-07-07 RX ORDER — ENOXAPARIN SODIUM 100 MG/ML
40 INJECTION SUBCUTANEOUS
Status: DISCONTINUED | OUTPATIENT
Start: 2022-07-07 | End: 2022-07-13 | Stop reason: HOSPADM

## 2022-07-07 RX ORDER — LORAZEPAM 0.5 MG/1
0.5 TABLET ORAL EVERY 6 HOURS PRN
Status: DISCONTINUED | OUTPATIENT
Start: 2022-07-07 | End: 2022-07-07

## 2022-07-07 RX ORDER — IBUPROFEN 800 MG/1
800 TABLET ORAL 3 TIMES DAILY PRN
Status: DISCONTINUED | OUTPATIENT
Start: 2022-07-10 | End: 2022-07-09

## 2022-07-07 RX ORDER — ENEMA 19; 7 G/133ML; G/133ML
1 ENEMA RECTAL
Status: DISCONTINUED | OUTPATIENT
Start: 2022-07-07 | End: 2022-07-13 | Stop reason: HOSPADM

## 2022-07-07 RX ORDER — BACLOFEN 10 MG/1
10 TABLET ORAL 3 TIMES DAILY
Status: DISCONTINUED | OUTPATIENT
Start: 2022-07-08 | End: 2022-07-09

## 2022-07-07 RX ORDER — ACETAMINOPHEN 500 MG
1000 TABLET ORAL EVERY 6 HOURS
Status: DISPENSED | OUTPATIENT
Start: 2022-07-07 | End: 2022-07-11

## 2022-07-07 RX ORDER — DEXAMETHASONE SODIUM PHOSPHATE 4 MG/ML
4 INJECTION, SOLUTION INTRA-ARTICULAR; INTRALESIONAL; INTRAMUSCULAR; INTRAVENOUS; SOFT TISSUE
Status: DISCONTINUED | OUTPATIENT
Start: 2022-07-07 | End: 2022-07-13 | Stop reason: HOSPADM

## 2022-07-07 RX ORDER — HALOPERIDOL 5 MG/ML
1 INJECTION INTRAMUSCULAR EVERY 6 HOURS PRN
Status: DISCONTINUED | OUTPATIENT
Start: 2022-07-07 | End: 2022-07-13 | Stop reason: HOSPADM

## 2022-07-07 RX ORDER — BISACODYL 10 MG
10 SUPPOSITORY, RECTAL RECTAL
Status: DISCONTINUED | OUTPATIENT
Start: 2022-07-07 | End: 2022-07-13 | Stop reason: HOSPADM

## 2022-07-07 RX ORDER — ACETAMINOPHEN 325 MG/1
650 TABLET ORAL EVERY 6 HOURS
Status: DISCONTINUED | OUTPATIENT
Start: 2022-07-07 | End: 2022-07-07

## 2022-07-07 RX ORDER — CEFAZOLIN SODIUM 2 G/100ML
2 INJECTION, SOLUTION INTRAVENOUS ONCE
Status: COMPLETED | OUTPATIENT
Start: 2022-07-07 | End: 2022-07-07

## 2022-07-07 RX ORDER — SCOLOPAMINE TRANSDERMAL SYSTEM 1 MG/1
1 PATCH, EXTENDED RELEASE TRANSDERMAL
Status: DISCONTINUED | OUTPATIENT
Start: 2022-07-07 | End: 2022-07-13 | Stop reason: HOSPADM

## 2022-07-07 RX ORDER — AMOXICILLIN 250 MG
1 CAPSULE ORAL NIGHTLY
Status: DISCONTINUED | OUTPATIENT
Start: 2022-07-07 | End: 2022-07-13 | Stop reason: HOSPADM

## 2022-07-07 RX ORDER — KETOROLAC TROMETHAMINE 30 MG/ML
15 INJECTION, SOLUTION INTRAMUSCULAR; INTRAVENOUS EVERY 6 HOURS
Status: DISCONTINUED | OUTPATIENT
Start: 2022-07-07 | End: 2022-07-09

## 2022-07-07 RX ORDER — AMOXICILLIN 250 MG
1 CAPSULE ORAL
Status: DISCONTINUED | OUTPATIENT
Start: 2022-07-07 | End: 2022-07-13 | Stop reason: HOSPADM

## 2022-07-07 RX ORDER — ONDANSETRON 2 MG/ML
4 INJECTION INTRAMUSCULAR; INTRAVENOUS EVERY 4 HOURS PRN
Status: DISCONTINUED | OUTPATIENT
Start: 2022-07-07 | End: 2022-07-13 | Stop reason: HOSPADM

## 2022-07-07 RX ORDER — NICOTINE 21 MG/24HR
21 PATCH, TRANSDERMAL 24 HOURS TRANSDERMAL
Status: DISCONTINUED | OUTPATIENT
Start: 2022-07-07 | End: 2022-07-13 | Stop reason: HOSPADM

## 2022-07-07 RX ADMIN — SENNOSIDES AND DOCUSATE SODIUM 1 TABLET: 50; 8.6 TABLET ORAL at 20:03

## 2022-07-07 RX ADMIN — LORAZEPAM 0.5 MG: 0.5 TABLET ORAL at 20:03

## 2022-07-07 RX ADMIN — LORAZEPAM 0.5 MG: 0.5 TABLET ORAL at 15:46

## 2022-07-07 RX ADMIN — ASPIRIN 81 MG: 81 TABLET, COATED ORAL at 11:17

## 2022-07-07 RX ADMIN — OXYCODONE HYDROCHLORIDE 10 MG: 10 TABLET ORAL at 20:03

## 2022-07-07 RX ADMIN — CEFAZOLIN SODIUM 2 G: 2 INJECTION, SOLUTION INTRAVENOUS at 03:45

## 2022-07-07 RX ADMIN — INSULIN HUMAN 1 UNITS: 100 INJECTION, SOLUTION PARENTERAL at 20:16

## 2022-07-07 RX ADMIN — FENTANYL CITRATE 25 MCG: 50 INJECTION, SOLUTION INTRAMUSCULAR; INTRAVENOUS at 01:44

## 2022-07-07 RX ADMIN — HYDROMORPHONE HYDROCHLORIDE 0.5 MG: 1 INJECTION, SOLUTION INTRAMUSCULAR; INTRAVENOUS; SUBCUTANEOUS at 07:44

## 2022-07-07 RX ADMIN — DOCUSATE SODIUM 100 MG: 100 CAPSULE, LIQUID FILLED ORAL at 18:41

## 2022-07-07 RX ADMIN — KETOROLAC TROMETHAMINE 15 MG: 30 INJECTION, SOLUTION INTRAMUSCULAR; INTRAVENOUS at 18:41

## 2022-07-07 RX ADMIN — LORAZEPAM 0.5 MG: 0.5 TABLET ORAL at 09:19

## 2022-07-07 RX ADMIN — CARVEDILOL 25 MG: 6.25 TABLET, FILM COATED ORAL at 18:40

## 2022-07-07 RX ADMIN — NICOTINE TRANSDERMAL SYSTEM 21 MG: 21 PATCH, EXTENDED RELEASE TRANSDERMAL at 10:00

## 2022-07-07 RX ADMIN — OXYCODONE HYDROCHLORIDE 10 MG: 5 SOLUTION ORAL at 01:36

## 2022-07-07 RX ADMIN — SODIUM CHLORIDE: 9 INJECTION, SOLUTION INTRAVENOUS at 03:45

## 2022-07-07 RX ADMIN — ACETAMINOPHEN 1000 MG: 500 TABLET ORAL at 18:39

## 2022-07-07 RX ADMIN — ACETAMINOPHEN 650 MG: 325 TABLET, FILM COATED ORAL at 11:18

## 2022-07-07 RX ADMIN — INSULIN HUMAN 2 UNITS: 100 INJECTION, SOLUTION PARENTERAL at 05:55

## 2022-07-07 RX ADMIN — TIOTROPIUM BROMIDE INHALATION SPRAY 5 MCG: 3.12 SPRAY, METERED RESPIRATORY (INHALATION) at 05:55

## 2022-07-07 RX ADMIN — ACETAMINOPHEN 650 MG: 325 TABLET, FILM COATED ORAL at 05:55

## 2022-07-07 RX ADMIN — OXYCODONE 5 MG: 5 TABLET ORAL at 06:16

## 2022-07-07 RX ADMIN — KETOROLAC TROMETHAMINE 15 MG: 30 INJECTION, SOLUTION INTRAMUSCULAR; INTRAVENOUS at 11:17

## 2022-07-07 RX ADMIN — OXYCODONE HYDROCHLORIDE 10 MG: 10 TABLET ORAL at 10:12

## 2022-07-07 RX ADMIN — ENOXAPARIN SODIUM 40 MG: 40 INJECTION SUBCUTANEOUS at 11:18

## 2022-07-07 RX ADMIN — INSULIN HUMAN 2 UNITS: 100 INJECTION, SOLUTION PARENTERAL at 11:08

## 2022-07-07 RX ADMIN — INSULIN HUMAN 1 UNITS: 100 INJECTION, SOLUTION PARENTERAL at 18:51

## 2022-07-07 ASSESSMENT — COGNITIVE AND FUNCTIONAL STATUS - GENERAL
DRESSING REGULAR UPPER BODY CLOTHING: A LITTLE
STANDING UP FROM CHAIR USING ARMS: A LOT
MOVING TO AND FROM BED TO CHAIR: UNABLE
DAILY ACTIVITIY SCORE: 15
SUGGESTED CMS G CODE MODIFIER MOBILITY: CL
MOVING FROM LYING ON BACK TO SITTING ON SIDE OF FLAT BED: UNABLE
TOILETING: A LOT
EATING MEALS: A LITTLE
MOBILITY SCORE: 10
DRESSING REGULAR LOWER BODY CLOTHING: A LOT
CLIMB 3 TO 5 STEPS WITH RAILING: TOTAL
TURNING FROM BACK TO SIDE WHILE IN FLAT BAD: A LITTLE
PERSONAL GROOMING: A LITTLE
WALKING IN HOSPITAL ROOM: A LOT
SUGGESTED CMS G CODE MODIFIER DAILY ACTIVITY: CK
HELP NEEDED FOR BATHING: A LOT

## 2022-07-07 ASSESSMENT — PAIN DESCRIPTION - PAIN TYPE
TYPE: ACUTE PAIN;SURGICAL PAIN
TYPE: ACUTE PAIN;SURGICAL PAIN
TYPE: SURGICAL PAIN
TYPE: ACUTE PAIN;SURGICAL PAIN
TYPE: ACUTE PAIN;SURGICAL PAIN
TYPE: ACUTE PAIN
TYPE: ACUTE PAIN;SURGICAL PAIN
TYPE: SURGICAL PAIN
TYPE: SURGICAL PAIN
TYPE: ACUTE PAIN

## 2022-07-07 ASSESSMENT — ACTIVITIES OF DAILY LIVING (ADL): TOILETING: INDEPENDENT

## 2022-07-07 ASSESSMENT — ENCOUNTER SYMPTOMS
PSYCHIATRIC NEGATIVE: 1
FALLS: 1
BACK PAIN: 0
CARDIOVASCULAR NEGATIVE: 1
NEUROLOGICAL NEGATIVE: 1
EYES NEGATIVE: 1
RESPIRATORY NEGATIVE: 1
CONSTITUTIONAL NEGATIVE: 1

## 2022-07-07 ASSESSMENT — GAIT ASSESSMENTS: GAIT LEVEL OF ASSIST: UNABLE TO PARTICIPATE

## 2022-07-07 ASSESSMENT — FIBROSIS 4 INDEX: FIB4 SCORE: 1.78

## 2022-07-07 NOTE — ED NOTES
Hospitalist at bedside. Pt oxygen saturations significantly dropped while she was asleep and required a sternum rub to arouse her and bring her levels back up. Daughter is now at bedside and has been updated on plan of care by the hospitalist.

## 2022-07-07 NOTE — OP REPORT
DATE OF OPERATION: 7/6/2022     PREOPERATIVE DIAGNOSIS: Right subtrochanteric femur fracture    POSTOPERATIVE DIAGNOSIS: Same    PROCEDURE PERFORMED: Surgical treatment of right subtrochanteric femur fracture with intramedullary device    SURGEON: Carl uHynh M.D.     ASSISTANT: None    ANESTHESIA: General    ESTIMATED BLOOD LOSS: 10 mL    INDICATIONS: The patient is a 66 y.o. female with a right subtrochanteric femur fracture resulting from a ground level fall.  The patient denies antecedent pain, and was found to have a normal neurovascular exam and skin envelope.  Radiographs reviewed by myself demonstrated the intertrochanteric femur fracture.  Given these findings, surgical treatment of the intertrochanteric fracture with an intramedullary device was indicated.  I discussed the risks and benefits of the procedure, including the risks of infection, wound healing complication, neurovascular injury, persistent hip pain, malunion, non-union, malrotation, and the medical risks of anesthesia including DVT, PE, MI, stroke, and death.  Benefits include early mobilization, improved chance of union, and reduction in the medical risks of hip fractures.  Alternatives to surgery were also discussed, including non-operative management.  The patient signed the informed consent and the operative extremity was marked.      PROCEDURE:  The patient underwent anesthesia, and was positioned supine on the fracture table and all bony prominences were well padded.  Preoperative antibiotics were administered. Sequential compression devices were employed.  Preoperative imaging was obtained, demonstrated reduction of the fracture using the table. The correct operative site was prepped and draped into a sterile field. A procedural pause was conducted to verify correct patient, correct extremity, presence of the surgeons initials on the operative extremity.    The guide pin for the OIC hip nail was placed into the tip of the greater  trochanter and advanced under fluoroscopic guidance to the appropriate position. An incision was made around the pin, and the entry reamer was then used to gain access to the femoral canal.  The guide pin was then removed.    A long ball-tip guide wire was advanced down the femur to the knee, its position confirmed on fluoroscopy.  We then measured for, and selected a 360 x 11 x 127.5 OIC hip nail.  We then sequentially reamed to a size 12.5 mm reamer, and advanced the nail over the guide pin to an appropriate depth, confirmed by fluoroscopy.    The soft tissue sleeve for the lag screw was then advanced down to bone through a lateral thigh incision.  The guide pin was advanced to a central position within the femoral neck/head, confirmed by fluoroscopy.  We measured our lag screw and reamed for our lag screw.  We then placed the lag screw by hand.  The fracture was compressed, and the set screw was locked proximally.  All instruments were removed from the proximal femur, and final fluoroscopic images obtained.    We then obtained perfect [place] imaging distally, and placed two statically interlocked distal screws, obtaining good bicortical purchase.    All wounds were irrigated  with normal saline, and closed in layers, with 2-0 Vicryl in the subcutaneous tissue, and staples in the skin.  Sterile dressings were applied.       The patient tolerated the procedure well. There were no apparent complications. All sponge, needle, and instrument counts were correct on two separate occasions. She was awakened, extubated, and transferred to the recovery room in satisfactory condition.     Post-Operative Plan:    1.  The patient should bear weight as tolerated on their operative extremity.  Gait aids (crutch or crutches, cane, walker) may be used as needed, and may be discontinued when no longer required.  2.  IV antibiotics - may be continued for 24 hours, but are not required.  3.  DVT prophylaxis - SCD's and Lovenox 40 mg  SQ daily while inpatient.  The patient may transition to Aspirin 325 mg PO BID as an outpatient  4.  Discharge planning  ____________________________________   Carl Huynh M.D.   DD: 7/6/2022  10:18 PM

## 2022-07-07 NOTE — PROGRESS NOTES
"Waiting to mobilize with PT    /66   Pulse 80   Temp 36.6 °C (97.8 °F) (Temporal)   Resp 20   Ht 1.626 m (5' 4\")   Wt 65.3 kg (144 lb)   SpO2 92%     Recent Labs     07/06/22  1844   WBC 7.2   RBC 3.77*   HEMOGLOBIN 9.2*   HEMATOCRIT 29.7*   MCV 78.8*   MCH 24.4*   MCHC 31.0*   RDW 43.4   PLATELETCT 175   MPV 9.4       POD#1  S/P Hip nail    Plan:  DVT Prophylaxis- TEDS/SCDs, lovenox while in hospital, ASA 81 mg PO BID as outpatient  Weight Bearing Status-WBAT  PT/OT  Antibiotics: 24 hrs post op  Case Coordination          "

## 2022-07-07 NOTE — CARE PLAN
The patient is Stable - Low risk of patient condition declining or worsening    Shift Goals  Clinical Goals: Pain control, Stable VS, rest, Q2 turns  Patient Goals: Rest, D/C  Family Goals: Rest, D/c      Problem: Pain - Standard  Goal: Alleviation of pain or a reduction in pain to the patient’s comfort goal  Outcome: Progressing  Note: Educated patient on the use of 1-10 pain scale and use of pain descriptors. Administered pain medication when needed per MAR. Non-pharmacological methods for pain control in place such as rest, repositioning, and enforcing a calm and conductive environment.      Problem: Fall Risk  Goal: Patient will remain free from falls  Outcome: Progressing  Note: Bed in lowest and locked position, call light is within reach, bed alarm is on, treaded socks in place. Patient oriented to room, plan of care and educated to use call light for assistance. Patient educated to not get out of bed without staff present.      Problem: Knowledge Deficit - Standard  Goal: Patient and family/care givers will demonstrate understanding of plan of care, disease process/condition, diagnostic tests and medications  Outcome: Progressing  Note: Patient educated on plan and goals of care and disease process. Education provided on medications, procedures, and equipment. Will continue to re-inforce education when required. All questions and concerns answered at this time.

## 2022-07-07 NOTE — THERAPY
Occupational Therapy   Initial Evaluation     Patient Name: Mckenna Sewell  Age:  66 y.o., Sex:  female  Medical Record #: [id number]  Today's Date: 7/7/2022     Precautions  Precautions: Fall Risk, Weight Bearing As Tolerated Right Lower Extremity    Assessment  Patient is 66 y.o. female with a diagnosis of R femur fx s/p IMN after GLF. Upon entering pt with writhing movements in bed w/ involuntary upward gaze, similar to tardive dyskinesia and dystonia. Pt reporting that this is normal for her. During eval, dtr arrived and reported this behavior is not normal for her and has only started since a few months ago when she began taking a new medication called Jardiance. Per dtr it caused this abnormal movement, dizziness leading to falls, and memory problems. RN present for this conversation.  Pt limited by pain, fatigue, dystonia, weakness, and impaired balance.    Plan    Recommend Occupational Therapy 4 times per week until therapy goals are met for the following treatments:  Adaptive Equipment, Neuro Re-Education / Balance, Self Care/Activities of Daily Living, Therapeutic Activities, and Therapeutic Exercises.    DC Equipment Recommendations: Unable to determine at this time  Discharge Recommendations: Recommend post-acute placement for additional occupational therapy services prior to discharge home     Subjective    Pleasant and cooperative     Objective       07/07/22 1147   Prior Living Situation   Prior Services Home-Independent   Housing / Facility 2 Story Apartment / Condo   Elevator No   Bathroom Set up Bathtub / Shower Combination;Shower Curtain   Equipment Owned None   Lives with - Patient's Self Care Capacity Adult Children   Comments Pt lives with her dtr.   Prior Level of ADL Function   Self Feeding Independent   Grooming / Hygiene Independent   Bathing Independent   Dressing Independent   Toileting Independent   Prior Level of IADL Function   Medication Management Independent   Laundry Independent    Kitchen Mobility Independent   Finances Independent   Home Management Independent   Shopping Independent   Prior Level Of Mobility Independent Without Device in Community;Independent Without Device in Home   History of Falls   History of Falls Yes   Precautions   Precautions Fall Risk;Weight Bearing As Tolerated Right Lower Extremity   Pain 0 - 10 Group   Therapist Pain Assessment Nurse Notified;During Activity  (mod c/o R hip pain)   Cognition    Cognition / Consciousness X   Level of Consciousness Alert   Attention Impaired   Comments pleasant and cooperative, odd affect; pt's dtr Crystal present reporting that she has had a lot of issues including memory issues since starting a new medication Jardiance a couple months ago.   Active ROM Upper Body   Active ROM Upper Body  WDL   Strength Upper Body   Upper Body Strength  X   Gross Strength Generalized Weakness, Equal Bilaterally.    Balance Assessment   Sitting Balance (Static) Poor +   Sitting Balance (Dynamic) Poor   Standing Balance (Static) Poor   Standing Balance (Dynamic) Poor -   Weight Shift Sitting Poor   Weight Shift Standing Poor   Comments w/ FWW; odd movements noted similar to dystonia or tardive dyskinesia; dtr reports this started shortly after staring new medication   Bed Mobility    Supine to Sit Moderate Assist   Sit to Supine Maximal Assist   Scooting Maximal Assist   ADL Assessment   Upper Body Dressing Moderate Assist  (don/doff gown)   Lower Body Dressing Maximal Assist  (don underwear)   Toileting Maximal Assist   How much help from another person does the patient currently need...   Putting on and taking off regular lower body clothing? 2   Bathing (including washing, rinsing, and drying)? 2   Toileting, which includes using a toilet, bedpan, or urinal? 2   Putting on and taking off regular upper body clothing? 3   Taking care of personal grooming such as brushing teeth? 3   Eating meals? 3   6 Clicks Daily Activity Score 15   Functional  Mobility   Sit to Stand Moderate Assist   Bed, Chair, Wheelchair Transfer Unable to Participate   Mobility EOB>STS>BTB   Comments w/ FWW   Patient / Family Goals   Patient / Family Goal #1 to go home   Short Term Goals   Short Term Goal # 1 pt will demo toilet txf with Davina   Short Term Goal # 2 pt will dress LB with Davina and AE prn   Short Term Goal # 3 pt will demo toileting with supv

## 2022-07-07 NOTE — ED NOTES
Pt changed into gown and belongings placed in a bag. Her underwear and pants were cut off so she opted to throw those away.

## 2022-07-07 NOTE — ANESTHESIA POSTPROCEDURE EVALUATION
Patient: Mckenna Sewell    Procedure Summary     Date: 07/06/22 Room / Location: [place] [address] / SURGERY [facility]    Anesthesia Start: 2140 Anesthesia Stop: 2234    Procedure: INSERTION, INTRAMEDULLARY FAVIAN, FEMUR, PROXIMAL (Right ) Diagnosis: (Right proximal femur fracture)    Surgeons: Carl Huynh M.D. Responsible Provider: Ilan Chatterjee M.D.    Anesthesia Type: general ASA Status: 4          Final Anesthesia Type: general  Last vitals  BP   Blood Pressure : 117/61    Temp   36.1 °C (97 °F)    Pulse   79   Resp   18    SpO2   97 %      Anesthesia Post Evaluation    Patient location during evaluation: PACU  Patient participation: complete - patient participated  Level of consciousness: awake and alert  Pain score: 0    Airway patency: patent  Anesthetic complications: no  Cardiovascular status: hemodynamically stable  Respiratory status: acceptable  Hydration status: euvolemic    PONV: none          No complications documented.     Nurse Pain Score: 8 (NPRS)

## 2022-07-07 NOTE — ED PROVIDER NOTES
ED Provider Note    Scribed for Noé Duong M.D. by Johnathan Chris. 7/6/2022, 6:32 PM.    Primary care provider: Willis Gomez M.D.  Means of arrival: EMS  History obtained from: Patient  History limited by: None    CHIEF COMPLAINT  Chief Complaint   Patient presents with    Leg Pain     Pt was walking to her car today when felt dizzy and fell. Pt has deformity noted to right thigh. PMS intact. Pt received 20mg Ketamine from EMS as well ads 250ml NS bolus for /60. Pt wears 2L NC at home, on same here sats 94%. Denies any thinners, but does takes ASA daily. Axox4 at this time.        HPI  Mckenna Sewell is a 66 y.o. female who presents to the Emergency Department for evaluation of right leg pain onset prior to arrival. Patient states that prior to arrival she lost her balance while getting out of her car and fell, causing her current symptoms. She states that she recently began taking jardiance for her diabetes, which have been making her dizzy. She is experiencing associated right leg and hip pain. She denies any neck, chest, or abdominal pain. The patient still has sensation in her right toes. She reports that she is allergic to codeine and morphine, which make her throw up.     REVIEW OF SYSTEMS  Pertinent positives include right leg and hip pain. Pertinent negatives include neck, chest, or abdominal pain. All other systems negative.    PAST MEDICAL HISTORY   has a past medical history of MAXIMILIANO (acute kidney injury) ([facility]) (5/15/2022), Benign essential hypertension, CAD (coronary artery disease), CHF (congestive heart failure) ([facility]), Congestive heart failure (HCC), COPD, COPD (chronic obstructive pulmonary disease) ([facility]), Dilated cardiomyopathy ([facility]), History of cardiac catheterization, Hypertension, Mixed hyperlipidemia, Nicotine dependence, Nicotine dependence, and Type 2 diabetes mellitus with complication ([facility]).    SURGICAL HISTORY   has a past surgical history that includes other cardiac  surgery and appendectomy.    SOCIAL HISTORY  Social History     Tobacco Use    Smoking status: Current Every Day Smoker     Packs/day: 0.50     Years: 40.00     Pack years: 20.00     Types: Cigarettes    Tobacco comment: 1/2 ppd   Vaping Use    Vaping Use: Never used   Substance Use Topics    Alcohol use: No    Drug use: No      Social History     Substance and Sexual Activity   Drug Use No       FAMILY HISTORY  Family History   Problem Relation Age of Onset    Heart Disease Father     Heart Disease Mother     Heart Disease Maternal Aunt     Heart Attack Maternal Aunt     Heart Disease Maternal Uncle        CURRENT MEDICATIONS  Current Outpatient Medications   Medication Instructions    albuterol (VENTOLIN OR PROVENTIL) 108 (90 BASE) MCG/ACT Aero Soln inhalation aerosol 2 Puffs, Inhalation, EVERY 6 HOURS PRN    aspirin EC (ECOTRIN) 81 mg, DAILY    carvedilol (COREG) 25 MG Tab TAKE ONE TABLET BY MOUTH TWICE DAILY WITH MEALS    cyclobenzaprine (FLEXERIL) 10 mg, Oral, EVERY BEDTIME    Empagliflozin (JARDIANCE) 25 MG Tab 1 Tablet, Oral, DAILY    famotidine (PEPCID) 20 mg, Oral, 2 TIMES DAILY    gabapentin (NEURONTIN) 600-900 mg, Oral, EVERY BEDTIME    glipiZIDE (GLUCOTROL) 10 mg, Oral, 2 TIMES DAILY    insulin aspart (NOVOLOG FLEXPEN) 100 UNIT/ML injection PEN Subcutaneous, 2 TIMES DAILY, PER SLIDING SCALE:<BR>2 units for level 150+, then additional 2 units for every 50 above.    Ipratropium-Albuterol (COMBIVENT INH) Inhale  by mouth.    losartan (COZAAR) 50 mg, Oral, 2 TIMES DAILY    nicotine (NICODERM) 14 MG/24HR PATCH 24 HR 1 Patch, Transdermal, EVERY 24 HOURS    simvastatin (ZOCOR) 20 MG Tab TAKE ONE TABLET BY MOUTH ONCE DAILY IN THE EVENING    Spiriva Respimat 5 mcg, Inhalation, DAILY    Tradjenta 5 mg, Oral, DAILY          ALLERGIES  Allergies   Allergen Reactions    Advair Diskus      Rash/hives    Codeine     Lisinopril     Sulfa Drugs        PHYSICAL EXAM  VITAL SIGNS: BP (!) 132/101   Pulse 74   Temp 36.9 °C  "(98.4 °F) (Temporal)   Resp 16   Ht 1.626 m (5' 4\")   Wt 65.3 kg (144 lb)   SpO2 95%   BMI 24.72 kg/m²     Constitutional: Well developed, Well nourished, Mild distress.   HENT: Normocephalic, Atraumatic, mask in place.  Eyes: Conjunctiva normal, No discharge.   Neck: Supple, No stridor.  No vertebral point tenderness  Cardiovascular: Normal heart rate, Normal rhythm, No murmurs, equal pulses, 2+ DP pulse  Pulmonary: Normal breath sounds, No respiratory distress, No wheezing, No rales, No rhonchi.  Chest: No chest wall tenderness or deformity.   Abdomen:Soft, No tenderness, No masses, no rebound, no guarding, Pelvis stable   Musculoskeletal:  Tender to right trochanter with internal rotation, with positive logroll tenderness.  Patient has no pain or tenderness to the knee, lower leg ankle or foot.  2+ DP pulse.  Normal cap refill time in all five toes.  Skin: Warm, Dry, No erythema, No rash.   Neurologic: Alert & oriented x 3, Normal motor function,  No focal deficits noted.   Psychiatric: Affect normal, Judgment normal, Mood normal.     LABS  Results for orders placed or performed during the hospital encounter of 07/06/22   CBC WITH DIFFERENTIAL   Result Value Ref Range    WBC 7.2 4.8 - 10.8 K/uL    RBC 3.77 (L) 4.20 - 5.40 M/uL    Hemoglobin 9.2 (L) 12.0 - 16.0 g/dL    Hematocrit 29.7 (L) 37.0 - 47.0 %    MCV 78.8 (L) 81.4 - 97.8 fL    MCH 24.4 (L) 27.0 - 33.0 pg    MCHC 31.0 (L) 33.6 - 35.0 g/dL    RDW 43.4 35.9 - 50.0 fL    Platelet Count 175 164 - 446 K/uL    MPV 9.4 9.0 - 12.9 fL    Neutrophils-Polys 58.30 44.00 - 72.00 %    Lymphocytes 26.80 22.00 - 41.00 %    Monocytes 9.30 0.00 - 13.40 %    Eosinophils 4.60 0.00 - 6.90 %    Basophils 0.60 0.00 - 1.80 %    Immature Granulocytes 0.40 0.00 - 0.90 %    Nucleated RBC 0.00 /100 WBC    Neutrophils (Absolute) 4.22 2.00 - 7.15 K/uL    Lymphs (Absolute) 1.94 1.00 - 4.80 K/uL    Monos (Absolute) 0.67 0.00 - 0.85 K/uL    Eos (Absolute) 0.33 0.00 - 0.51 K/uL    " Baso (Absolute) 0.04 0.00 - 0.12 K/uL    Immature Granulocytes (abs) 0.03 0.00 - 0.11 K/uL    NRBC (Absolute) 0.00 K/uL   COMP METABOLIC PANEL   Result Value Ref Range    Sodium 140 135 - 145 mmol/L    Potassium 3.7 3.6 - 5.5 mmol/L    Chloride 106 96 - 112 mmol/L    Co2 20 20 - 33 mmol/L    Anion Gap 14.0 7.0 - 16.0    Glucose 164 (H) 65 - 99 mg/dL    Bun 33 (H) 8 - 22 mg/dL    Creatinine 1.60 (H) 0.50 - 1.40 mg/dL    Calcium 8.6 8.5 - 10.5 mg/dL    AST(SGOT) 20 12 - 45 U/L    ALT(SGPT) 19 2 - 50 U/L    Alkaline Phosphatase 69 30 - 99 U/L    Total Bilirubin 0.2 0.1 - 1.5 mg/dL    Albumin 3.5 3.2 - 4.9 g/dL    Total Protein 6.4 6.0 - 8.2 g/dL    Globulin 2.9 1.9 - 3.5 g/dL    A-G Ratio 1.2 g/dL   ESTIMATED GFR   Result Value Ref Range    GFR (CKD-EPI) 35 (A) >60 mL/min/1.73 m 2   EKG (NOW)   Result Value Ref Range    Report       [facility] Emergency [facility]    Test Date:  2022  Pt Name:    RUPESH WONG                 Department: ER  MRN:        [id number]                      Room:       [facility]  Gender:     Female                       Technician: [id number]  :        [id number]                   Requested By:BARBARA AGUILA  Order #:    [id number]                    Reading MD: BARBARA AGUILA MD    Measurements  Intervals                                Axis  Rate:       73                           P:          58  NM:         127                          QRS:        99  QRSD:       171                          T:          264  QT:         501  QTc:        551    Interpretive Statements  Atrial-sensed ventricular-paced rhythm  Biventricular paced rhythm  No further analysis attempted due to paced rhythm  Compared to ECG 05/15/2022 21:39:25  No significant changes  Electronically Signed On 2022 19:39:18 PDT by BARBARA AGUILA MD        All labs reviewed by me.    EKG  12 Lead EKG: interpreted by me as above.     RADIOLOGY  DX-FEMUR-2+ RIGHT   Final Result      Comminuted and  displaced subtrochanteric RIGHT proximal femur fracture.      DX-PELVIS-1 OR 2 VIEWS   Final Result      1.  Comminuted, displaced and angulated RIGHT proximal femur fracture likely subtrochanteric with intertrochanteric extension   2.  No pelvic fracture.      DX-FEMUR-2+ RIGHT    (Results Pending)   DX-PORTABLE FLUOROSCOPY < 1 HOUR    (Results Pending)     The radiologist's interpretation of all radiological studies have been reviewed by me.    COURSE & MEDICAL DECISION MAKING  Pertinent Labs & Imaging studies reviewed. (See chart for details)    6:32 PM - Patient seen and examined at bedside. I informed the patient of my plan of care including labs and imaging. Patient will be treated with Zofran 4 mg and Dilaudid 1 mg. Ordered EKG, UA, CMP, CBC w/ diff., DX-femur, DX-Hip-unilateral with pelvis to evaluate her symptoms. The differential diagnoses include but are not limited to: Fracture, contusion, anemia, eurhythmia, dehydration, electrolyte abnormalities.      7:43 PM - Paged hospitalist and Orthopedics.    7:43 PM - I discussed the patient's case and the above findings with Dr. Huynh ([facility]    7:47 PM - I discussed the patient's case and the above findings with Dr. Torres (Hospitalist) who agrees to admit.    Medical Decision Making: Patient presents after having lightheadedness with a ground-level fall and fracture to her right hip.  Patient will be taken the OR for surgical fixation.  Patient does appear to have a little bit of MAXIMILIANO and slightly more anemia which may be contributing to her dizziness.  Patient has a paced rhythm on EKG.  Which appears to have capture.  She denies any chest pain shortness of breath or severe headache prior to this.    DISPOSITION:  Patient will be hospitalized by Dr. Torres in guarded condition.     FINAL IMPRESSION  1. Closed displaced subtrochanteric fracture of right femur, initial encounter (HCC)    2. Dizziness    3. Anemia,  unspecified type          I, Johnathan Chris (Scribe), am scribing for, and in the presence of, Noé Duong M.D.    Electronically signed by: Johnathan Chris (Roxi), 7/6/2022    INoé M.D. personally performed the services described in this documentation, as scribed by Johnathan Chris in my presence, and it is both accurate and complete.    The note accurately reflects work and decisions made by me.  Noé Duong M.D.  7/6/2022  10:55 PM

## 2022-07-07 NOTE — OR NURSING
Second bladder scan 450 cc. Pt denies any urge to void. Landeros placed per orders. Clear yellow urine draining out.

## 2022-07-07 NOTE — ED TRIAGE NOTES
Pt was walking to her car today when felt dizzy and fell. Pt has deformity noted to right thigh. PMS intact. Pt received 20mg Ketamine from EMS as well ads 250ml NS bolus for /60. Pt wears 2L NC at home, on same here sats 94%. Denies any thinners, but does takes ASA daily. Axox4 at this time.

## 2022-07-07 NOTE — PROGRESS NOTES
[facility] Medicine Daily Progress Note    Date of Service  7/7/2022    Chief Complaint  Mckenna Sewell is a 66 y.o. female admitted 7/6/2022 with fall    Hospital Course    66-year-old female with history of alcoholism, diabetes, smoking and meth abuse presented 7/6 with fall and hip pain patient has some dizziness and left on her right hip, came to the emergency and x-ray showed comminuted displaced inguinal right paroxysmal femoral fracture, patient was evaluated by orthopedic and underwent surgery on 7/7.    Patient has history of meth abuse and urine drug screen was positive for meth, since patient has history of dizziness we will order echo for evaluation    Interval Problem Update  -Patient was evaluated after surgery, patient having some pain on her hip  -We will order echo for dizziness and fall  -Hemoglobin dropped to 9 from 11, no signs of bleeding, continue monitoring.  -Improving kidney function    I have discussed this patient's plan of care and discharge plan at IDT rounds today with Case Management, Nursing, Nursing leadership, and other members of the IDT team.    Consultants/Specialty  orthopedics    Code Status  Full Code    Disposition  Patient is not medically cleared for discharge.   Anticipate discharge to to skilled nursing facility.  I have placed the appropriate orders for post-discharge needs.    Review of Systems  Review of Systems   Constitutional: Negative.    Eyes: Negative.    Respiratory: Negative.    Cardiovascular: Negative.    Genitourinary: Negative.    Musculoskeletal: Positive for falls and joint pain. Negative for back pain.   Skin: Negative.    Neurological: Negative.    Psychiatric/Behavioral: Negative.         Physical Exam  Temp:  [36.1 °C (97 °F)-36.9 °C (98.4 °F)] 36.1 °C (97 °F)  Pulse:  [67-86] 86  Resp:  [10-23] 16  BP: ()/() 119/53  SpO2:  [48 %-100 %] 81 %    Physical Exam  Constitutional:       Appearance: She is ill-appearing.   HENT:      Head:  Normocephalic and atraumatic.   Eyes:      General: No scleral icterus.  Cardiovascular:      Rate and Rhythm: Tachycardia present.      Heart sounds: No murmur heard.  Pulmonary:      Effort: No respiratory distress.      Breath sounds: No wheezing.   Abdominal:      General: There is no distension.      Tenderness: There is no abdominal tenderness. There is no guarding.   Musculoskeletal:         General: Tenderness present. No deformity.      Right lower leg: No edema.      Left lower leg: No edema.   Neurological:      Mental Status: She is alert and oriented to person, place, and time.      Cranial Nerves: No cranial nerve deficit.      Motor: No weakness.         Fluids    Intake/Output Summary (Last 24 hours) at 7/7/2022 1658  Last data filed at 7/7/2022 1320  Gross per 24 hour   Intake 502 ml   Output 1400 ml   Net -898 ml       Laboratory  Recent Labs     07/06/22  1844   WBC 7.2   RBC 3.77*   HEMOGLOBIN 9.2*   HEMATOCRIT 29.7*   MCV 78.8*   MCH 24.4*   MCHC 31.0*   RDW 43.4   PLATELETCT 175   MPV 9.4     Recent Labs     07/06/22  1844 07/07/22  0300   SODIUM 140 136   POTASSIUM 3.7 3.9   CHLORIDE 106 104   CO2 20 22   GLUCOSE 164* 215*   BUN 33* 26*   CREATININE 1.60* 1.17   CALCIUM 8.6 8.7                   Imaging  DX-FEMUR-2+ RIGHT   Final Result      Intraoperative image(s) as described.      DX-PORTABLE FLUOROSCOPY < 1 HOUR   Final Result      Portable fluoroscopy utilized for 43 seconds.         INTERPRETING LOCATION: [address] [place] [address], [facility]      DX-FEMUR-2+ RIGHT   Final Result      Comminuted and displaced subtrochanteric RIGHT proximal femur fracture.      DX-PELVIS-1 OR 2 VIEWS   Final Result      1.  Comminuted, displaced and angulated RIGHT proximal femur fracture likely subtrochanteric with intertrochanteric extension   2.  No pelvic fracture.           Assessment/Plan  * Femur fracture (HCC)  Assessment & Plan  Admit patient to orthopedic floor  Orthopedic surgery consulted, patient will  go for OR tonight  Dilaudid 0.5 mg every 4 hours as needed  PT OT  Normal saline 125 mL/h  Weightbearing as tolerated        Methamphetamine abuse ([facility])  Assessment & Plan  Reportedly used methamphetamine for over 20 years over 10 years ago.  No recent use.  Send U tox    MAXIMILIANO (acute kidney injury) ([facility])  Assessment & Plan  Suspected from dehydration  Fluids  Serial BMP    Type 2 diabetes mellitus with complication ([facility])- (present on admission)  Assessment & Plan  Sliding scale    Hyperlipidemia  Assessment & Plan  Continue Zocor    COPD (chronic obstructive pulmonary disease) ([facility])- (present on admission)  Assessment & Plan  Continue Spiriva and albuterol as needed  Oxygen as needed, keep O2 over 90%         VTE prophylaxis: SCDs/TEDs and enoxaparin ppx    I have performed a physical exam and reviewed and updated ROS and Plan today (7/7/2022). In review of yesterday's note (7/6/2022), there are no changes except as documented above.

## 2022-07-07 NOTE — OR NURSING
Pt transported to [facility] via hospital bed. Wheeled up to room by NANCY Villafana.   Pt's daughter, Marilia, at bedside. Med Rec remains partially complete, daughter awaiting list of meds from MD's office. Requested lunch tray from kitchen, voicemail left. Report given to AUNDREA Edmonds.

## 2022-07-07 NOTE — PROGRESS NOTES
NOC HOSPITALIST CROSS COVER    Notified by RN regarding patient being diabetic, but having a regular diet order and no insulin orders.      Plan:  #Diabetes  -Insulin order set implemented with low insulin sliding scale  -Modified diet to diabetic        -----------------------------------------------------------------------------------------------------------    Electronically signed by:  MAU Bello, AGACNP-BC

## 2022-07-07 NOTE — H&P
Hospital Medicine History & Physical Note    Date of Service  7/6/2022    Primary Care Physician  Willis Gomez M.D.    Consultants  Orthopedic surgery    Code Status  Full Code    Chief Complaint  Chief Complaint   Patient presents with   • Leg Pain     Pt was walking to her car today when felt dizzy and fell. Pt has deformity noted to right thigh. PMS intact. Pt received 20mg Ketamine from EMS as well ads 250ml NS bolus for /60. Pt wears 2L NC at home, on same here sats 94%. Denies any thinners, but does takes ASA daily. Axox4 at this time.        History of Presenting Illness  Mckenna Sewell is a 66 y.o. female who presented 7/6/2022 with a mechanical fall.  Recently started on Jardiance 3 weeks ago.  Since then she has been feeling dizzy when she goes from a sitting to standing position.  Today she stood up while outside, suddenly felt dizzy, and fell on her right hip.  Noticed right hip pain immediately after.  No head strike or loss of consciousness was noted.    Has an extensive history of smoking methamphetamine for about 20 years, stopped about 10 years ago.  Denies any recent use.    Of note, daughter at bedside states that patient recently has had memory problems for the last year.  Concerned of dementia, never has had formal evaluation.    In the ED, patient found to have normal vital signs.  Remarkable labs include creatinine 1.6.  which x-ray hip showing comminuted, displaced and angulated right proximal femoral fracture likely subtrochanteric with intertrochanteric extension.  Orthopedic surgery consulted, will go for OR tonight.      I discussed the plan of care with patient.    Review of Systems  Review of Systems   Constitutional: Positive for malaise/fatigue.   HENT: Negative.    Eyes: Negative.    Respiratory: Negative.    Cardiovascular: Negative.    Gastrointestinal: Negative.    Genitourinary: Negative.    Musculoskeletal: Positive for falls and joint pain.   Skin: Negative.     Neurological: Negative.    Endo/Heme/Allergies: Negative.    Psychiatric/Behavioral: Negative.        Past Medical History   has a past medical history of MAXIMILIANO (acute kidney injury) ([facility]) (5/15/2022), Benign essential hypertension, CAD (coronary artery disease), CHF (congestive heart failure) (HCC), Congestive heart failure (HCC), COPD, COPD (chronic obstructive pulmonary disease) (HCC), Dilated cardiomyopathy (HCC), History of cardiac catheterization, Hypertension, Mixed hyperlipidemia, Nicotine dependence, Nicotine dependence, and Type 2 diabetes mellitus with complication ([facility]).    Surgical History   has a past surgical history that includes other cardiac surgery and appendectomy.     Family History  family history includes Heart Attack in her maternal aunt; Heart Disease in her father, maternal aunt, maternal uncle, and mother.   Family history reviewed with patient. There is no family history that is pertinent to the chief complaint.     Social History   reports that she has been smoking cigarettes. She has a 20.00 pack-year smoking history. She does not have any smokeless tobacco history on file. She reports that she does not drink alcohol and does not use drugs.    Allergies  Allergies   Allergen Reactions   • Advair Diskus      Rash/hives   • Codeine    • Lisinopril    • Sulfa Drugs        Medications  Prior to Admission Medications   Prescriptions Last Dose Informant Patient Reported? Taking?   Empagliflozin (JARDIANCE) 25 MG Tab  Patient Yes No   Sig: Take 1 Tablet by mouth every day.   Ipratropium-Albuterol (COMBIVENT INH)  Patient Yes No   Sig: Inhale  by mouth.   albuterol (VENTOLIN OR PROVENTIL) 108 (90 BASE) MCG/ACT Aero Soln inhalation aerosol  Patient Yes No   Sig: Inhale 2 Puffs by mouth every 6 hours as needed for Shortness of Breath.   aspirin EC (ECOTRIN) 81 MG TBEC  Patient Yes No   Sig: Take 81 mg by mouth every day.     carvedilol (COREG) 25 MG Tab  Patient No No   Sig: TAKE ONE TABLET BY  MOUTH TWICE DAILY WITH MEALS   cyclobenzaprine (FLEXERIL) 10 mg Tab  Patient Yes No   Sig: Take 10 mg by mouth at bedtime.   famotidine (PEPCID) 20 MG Tab  Patient Yes No   Sig: Take 20 mg by mouth in the morning and 20 mg in the evening.   gabapentin (NEURONTIN) 300 MG Cap  Patient Yes No   Sig: Take 600-900 mg by mouth at bedtime.   glipiZIDE (GLUCOTROL) 10 MG Tab  Patient Yes No   Sig: Take 10 mg by mouth in the morning and 10 mg in the evening.   insulin aspart (NOVOLOG FLEXPEN) 100 UNIT/ML injection PEN  Patient Yes No   Sig: Inject  under the skin 2 times a day. PER SLIDING SCALE:  2 units for level 150+, then additional 2 units for every 50 above.   linagliptin (TRADJENTA) 5 MG Tab tablet  Patient Yes No   Sig: Take 5 mg by mouth every day.   losartan (COZAAR) 50 MG Tab  Patient No No   Sig: Take 1 Tab by mouth 2 Times a Day.   nicotine (NICODERM) 14 MG/24HR PATCH 24 HR  Patient Yes No   Sig: Place 1 Patch on the skin every 24 hours.   simvastatin (ZOCOR) 20 MG Tab  Patient No No   Sig: TAKE ONE TABLET BY MOUTH ONCE DAILY IN THE EVENING   tiotropium (SPIRIVA RESPIMAT) 2.5 mcg/Act Aero Soln  Patient Yes No   Sig: Inhale 5 mcg every day.      Facility-Administered Medications: None       Physical Exam  Temp:  [36.9 °C (98.4 °F)] 36.9 °C (98.4 °F)  Pulse:  [74] 74  Resp:  [16] 16  BP: (132)/(101) 132/101  SpO2:  [95 %] 95 %  Blood Pressure : (!) 132/101   Temperature: 36.9 °C (98.4 °F)   Pulse: 74   Respiration: 16   Pulse Oximetry: 95 %       Physical Exam  Constitutional:       Appearance: Normal appearance. She is normal weight.      Comments: Appears older than stated age   HENT:      Head: Normocephalic.      Nose: Nose normal.      Mouth/Throat:      Mouth: Mucous membranes are moist.   Eyes:      Pupils: Pupils are equal, round, and reactive to light.   Cardiovascular:      Rate and Rhythm: Normal rate and regular rhythm.   Pulmonary:      Effort: Pulmonary effort is normal.      Breath sounds: Normal  breath sounds.   Abdominal:      General: Abdomen is flat. Bowel sounds are normal.      Palpations: Abdomen is soft.   Musculoskeletal:      Comments: Range of motion limited in right lower extremity due to pain.  Right lower extremity internally rotated   Skin:     General: Skin is warm.   Neurological:      Mental Status: She is alert and oriented to person, place, and time.   Psychiatric:         Mood and Affect: Mood normal.         Behavior: Behavior normal.         Thought Content: Thought content normal.         Judgment: Judgment normal.         Laboratory:  Recent Labs     07/06/22  1844   WBC 7.2   RBC 3.77*   HEMOGLOBIN 9.2*   HEMATOCRIT 29.7*   MCV 78.8*   MCH 24.4*   MCHC 31.0*   RDW 43.4   PLATELETCT 175   MPV 9.4     Recent Labs     07/06/22  1844   SODIUM 140   POTASSIUM 3.7   CHLORIDE 106   CO2 20   GLUCOSE 164*   BUN 33*   CREATININE 1.60*   CALCIUM 8.6     Recent Labs     07/06/22  1844   ALTSGPT 19   ASTSGOT 20   ALKPHOSPHAT 69   TBILIRUBIN 0.2   GLUCOSE 164*         No results for input(s): NTPROBNP in the last 72 hours.      No results for input(s): TROPONINT in the last 72 hours.    Imaging:  DX-FEMUR-2+ RIGHT   Final Result      Comminuted and displaced subtrochanteric RIGHT proximal femur fracture.      DX-PELVIS-1 OR 2 VIEWS   Final Result      1.  Comminuted, displaced and angulated RIGHT proximal femur fracture likely subtrochanteric with intertrochanteric extension   2.  No pelvic fracture.      DX-FEMUR-2+ RIGHT    (Results Pending)   DX-PORTABLE FLUOROSCOPY < 1 HOUR    (Results Pending)       X-Ray:  I have personally reviewed the images and compared with prior images.    Assessment/Plan:  Justification for Admission Status  I anticipate this patient will require at least two midnights for appropriate medical management, necessitating inpatient admission because She has a hip fracture that will require surgery.  Will likely need to stay in the hospital for physical therapy after.    *  Femur fracture ([facility])  Assessment & Plan  Admit patient to orthopedic floor  Orthopedic surgery consulted, patient will go for OR tonight  Dilaudid 0.5 mg every 4 hours as needed  PT OT  Normal saline 125 mL/h  Weightbearing as tolerated        Methamphetamine abuse ([facility])  Assessment & Plan  Reportedly used methamphetamine for over 20 years over 10 years ago.  No recent use.  Send U tox    MAXIMILIANO (acute kidney injury) ([facility])  Assessment & Plan  Suspected from dehydration  Fluids  Serial BMP    Type 2 diabetes mellitus with complication ([facility])- (present on admission)  Assessment & Plan  Sliding scale    Hyperlipidemia  Assessment & Plan  Continue Zocor    COPD (chronic obstructive pulmonary disease) ([facility])- (present on admission)  Assessment & Plan  Continue Spiriva and albuterol as needed  Oxygen as needed, keep O2 over 90%      VTE prophylaxis: heparin ppx

## 2022-07-07 NOTE — ASSESSMENT & PLAN NOTE
Likely secondary to MAXIMILIANO from prerenal etiology, monitor, avoid nephrotoxin  Today creatinine at 0.57

## 2022-07-07 NOTE — ASSESSMENT & PLAN NOTE
Patient still feeling lightheaded prior to presentation, continue IV fluid, obtain BNP, echocardiogram ordered, pending  --Orthopedic surgery was consulted, patient underwent surgery   -- PT/OT recs post-acute   , DVT prophylaxis  Multimodal pain management   Medically stable to be discharged to skilled nursing home

## 2022-07-07 NOTE — ASSESSMENT & PLAN NOTE
History of smoking   No exacerbation   continue Spiriva and albuterol as needed  Does use 2 L of o2 at home, which is her abseline   , keep O2 over 90%

## 2022-07-07 NOTE — THERAPY
"Physical Therapy   Initial Evaluation     Patient Name: Mckenna Sewell  Age:  66 y.o., Sex:  female  Medical Record #: [id number]  Today's Date: 7/7/2022     Precautions  Precautions: Fall Risk;Weight Bearing As Tolerated Right Lower Extremity    Assessment  Patient is a 66 y.o. female who was admitted after a GLF and diagnosed with a R hip fx, now s/p IM nail. PMH significant for COPD, diabetes, CHF, biventricular ICD. Pt received in bed and agreeable to PT evaluation. Pt demonstrated involuntary upward gaze and writhing movements in the bed, similar to tardive dyskinesia or dystonia. Pt and daughter report this has only begun in the last few months since beginning a new medication, Jardiance. Pt required mod to max A for sup<>sit and brief standing bout at edge of bed. At current level, recommending further rehab prior to return home. Will continue to follow for acute PT.    Plan    Recommend Physical Therapy 4 times per week until therapy goals are met for the following treatments:  Bed Mobility, Equipment, Gait Training, Neuro Re-Education / Balance, Self Care/Home Evaluation, Stair Training, Therapeutic Activities, and Therapeutic Exercises    DC Equipment Recommendations: Unable to determine at this time  Discharge Recommendations: Recommend post-acute placement for additional physical therapy services prior to discharge home       Subjective    \"It hurts\"     Objective       07/07/22 1151   Initial Contact Note    Initial Contact Note Order Received and Verified, Physical Therapy Evaluation in Progress with Full Report to Follow.   Precautions   Precautions Fall Risk;Weight Bearing As Tolerated Right Lower Extremity   Pain 0 - 10 Group   Therapist Pain Assessment During Activity;Nurse Notified  (c/o mod R hip pain)   Prior Living Situation   Prior Services Home-Independent   Housing / Facility 2 Story Apartment / Condo   Steps Into Home 1   Steps In Home 15   Equipment Owned None   Lives with - Patient's Self " Care Capacity Adult Children   Comments Pt lives with her adult daughter, who plans to be able to assist upon DC.   Prior Level of Functional Mobility   Comments Independent PTA, no AD   History of Falls   History of Falls Yes   Date of Last Fall   (reason for admission)   Cognition    Level of Consciousness Alert   Attention Impaired   Comments Pleasant and cooperative, although odd affect. Daughter present and reporting change in memory and other issues that seemed to coincide with starting a new medication, Jardiance.   Active ROM Lower Body    Comments RLE impaired due to expected post-op pain   Strength Lower Body   Comments LLE WFL. RLE impaired due to post-op pain, unable to lift against gravity   Sensation Lower Body   Comments Denies LE sensory changes   Neurological Concerns   Comments Writhing-type movements in BUE and facial movements during this session   Balance Assessment   Sitting Balance (Static) Poor +   Sitting Balance (Dynamic) Poor   Standing Balance (Static) Poor   Standing Balance (Dynamic) Poor -   Weight Shift Sitting Poor   Weight Shift Standing Poor   Comments FWW in standing. Dystonic movements noted throughout in sitting and standing.   Gait Analysis   Gait Level Of Assist Unable to Participate   Bed Mobility    Supine to Sit Moderate Assist   Sit to Supine Maximal Assist   Scooting Maximal Assist   Functional Mobility   Sit to Stand Moderate Assist   Bed, Chair, Wheelchair Transfer Unable to Participate   Toilet Transfers Unable to Participate   Comments Cues for hand placement on the FWW with standing   How much difficulty does the patient currently have...   Turning over in bed (including adjusting bedclothes, sheets and blankets)? 3   Sitting down on and standing up from a chair with arms (e.g., wheelchair, bedside commode, etc.) 1   Moving from lying on back to sitting on the side of the bed? 1   How much help from another person does the patient currently need...   Moving to and  from a bed to a chair (including a wheelchair)? 2   Need to walk in a hospital room? 2   Climbing 3-5 steps with a railing? 1   6 clicks Mobility Score 10   Short Term Goals    Short Term Goal # 1 Pt will perform bed mobility with supervision to improve independence in 6 visits.   Short Term Goal # 2 Pt will perform transfers with FWW and supervision to progress mobility in 6 visits.   Short Term Goal # 3 Pt will ambulate 100ft with FWW and supervision to progress mobility in 6 visits.   Short Term Goal # 4 Pt will tolerate at least 8 stairs with min A to progress mobility in 6 visits.   Education Group   Education Provided Weight Bearing Status;Role of Physical Therapist   Role of Physical Therapist Patient Response Patient;Family;Acceptance;Explanation;Verbal Demonstration   Weight Bearing Status Patient Response Patient;Family;Explanation;Verbal Demonstration   Problem List    Problems Pain;Impaired Bed Mobility;Impaired Transfers;Impaired Ambulation;Functional Strength Deficit;Functional ROM Deficit;Impaired Balance;Decreased Activity Tolerance   Anticipated Discharge Equipment and Recommendations   DC Equipment Recommendations Unable to determine at this time   Discharge Recommendations Recommend post-acute placement for additional physical therapy services prior to discharge home   Interdisciplinary Plan of Care Collaboration   IDT Collaboration with  Nursing;Occupational Therapist;Family / Caregiver   Patient Position at End of Therapy In Bed;Call Light within Reach;Tray Table within Reach;Phone within Reach;Family / Friend in Room   Collaboration Comments RN updated   Session Information   Date / Session Number  7/7-1(1/4, 7/13)

## 2022-07-07 NOTE — OR NURSING
Patient ready for procedure. IV access Patent. Pre-op check list complete. Upper nad lower dentures removed and in a cup with patient chart. WHO checklist complete at bedside with AUNDREA Parmar. All belongings given to patient's daughter except thumb ring nose ring and dentures. SCD on non operative extremity.

## 2022-07-07 NOTE — ANESTHESIA PREPROCEDURE EVALUATION
Case: [id number] Date/Time: 07/06/22 2105    Procedure: INSERTION, INTRAMEDULLARY FAVIAN, FEMUR, PROXIMAL (Right ) - OIC    Anesthesia type: General    Pre-op diagnosis: Right proximal femur fracture    Location: [facility] OR  / SURGERY [facility]    Surgeons: Carl Huynh M.D.          Relevant Problems   PULMONARY   (positive) COPD (chronic obstructive pulmonary disease) ([facility])      CARDIAC   (positive) Benign essential hypertension   (positive) CHF (congestive heart failure) ([facility])         (positive) MAXIMILIANO (acute kidney injury) ([facility])      ENDO   (positive) Type 2 diabetes mellitus with complication ([facility])       Physical Exam    Airway   Mallampati: II  TM distance: >3 FB  Neck ROM: full       Cardiovascular - normal exam  Rhythm: regular  Rate: normal  (-) murmur     Dental - normal exam           Pulmonary - normal exam  Breath sounds clear to auscultation     Abdominal    Neurological - normal exam                 Anesthesia Plan    ASA 4       Plan - general       Airway plan will be ETT    (Icd;   chf; low EF copd)                Informed Consent:

## 2022-07-07 NOTE — ANESTHESIA PROCEDURE NOTES
Airway  Performed by: Ilan Chatterjee M.D.  Authorized by: lIan Chatterjee M.D.     Location:  OR  Urgency:  Elective  Indications for Airway Management:  Anesthesia      Spontaneous Ventilation: absent    Sedation Level:  Deep  Preoxygenated: Yes    Mask Difficulty Assessment:  1 - vent by mask  Final Airway Type:  Supraglottic airway  Final Supraglottic Airway:  Standard LMA    SGA Size:  4  Number of Attempts at Approach:  1

## 2022-07-07 NOTE — DISCHARGE PLANNING
[facility] Transitional Care Coordination    Referral from:  Dr. Huynh  Insurance Provider on Facesheet: [employer] - Rehab PAR to verify post acute benefit  Potential Rehab Diagnosis:  Unilateral femur fracture    Chart review indicates patient has on going medical management and may have therapy needs to possibly meet inpatient rehab facility criteria with the goal of returning to community.    D/C support: Adult Children     Physiatry consultation pended while waiting for additional information.  POD 1 s/p R hip nailing.  Would welcome PT as clinically appropriate.  TCC will follow.  Please reach out sooner if PMR consult requested for medical management.      Last Covid test:       Thank you for the referral.

## 2022-07-07 NOTE — CONSULTS
7/6/2022    Time Called: 19:38  Time Arrived: 21:16    The patient was seen at the request of Dr Torres    HPI: Mckenna Sewell is a 66 y.o. female who presents with complaints of pain to right hip.  This started today after fall.  The pain is 8/10 and is described as sharp.  The pain is made worse by palpation of the area and made better by rest and immobilization.    Past Medical History:   Diagnosis Date   • MAXIMILIANO (acute kidney injury) ([facility]) 5/15/2022   • Benign essential hypertension    • CAD (coronary artery disease)     MI 3/08   • CHF (congestive heart failure) ([facility])    • Congestive heart failure ([facility])    • COPD    • COPD (chronic obstructive pulmonary disease) ([facility])    • Dilated cardiomyopathy ([facility])    • History of cardiac catheterization    • Hypertension    • Mixed hyperlipidemia    • Nicotine dependence    • Nicotine dependence    • Type 2 diabetes mellitus with complication ([facility])        Past Surgical History:   Procedure Laterality Date   • APPENDECTOMY     • OTHER CARDIAC SURGERY         Medications  No current facility-administered medications on file prior to encounter.     Current Outpatient Medications on File Prior to Encounter   Medication Sig Dispense Refill   • linagliptin (TRADJENTA) 5 MG Tab tablet Take 5 mg by mouth every day.     • gabapentin (NEURONTIN) 300 MG Cap Take 600-900 mg by mouth at bedtime.     • glipiZIDE (GLUCOTROL) 10 MG Tab Take 10 mg by mouth in the morning and 10 mg in the evening.     • nicotine (NICODERM) 14 MG/24HR PATCH 24 HR Place 1 Patch on the skin every 24 hours.     • Empagliflozin (JARDIANCE) 25 MG Tab Take 1 Tablet by mouth every day.     • cyclobenzaprine (FLEXERIL) 10 mg Tab Take 10 mg by mouth at bedtime.     • famotidine (PEPCID) 20 MG Tab Take 20 mg by mouth in the morning and 20 mg in the evening.     • tiotropium (SPIRIVA RESPIMAT) 2.5 mcg/Act Aero Soln Inhale 5 mcg every day.     • insulin aspart (NOVOLOG FLEXPEN) 100 UNIT/ML injection PEN Inject  under the  "skin 2 times a day. PER SLIDING SCALE:  2 units for level 150+, then additional 2 units for every 50 above.     • carvedilol (COREG) 25 MG Tab TAKE ONE TABLET BY MOUTH TWICE DAILY WITH MEALS 180 Tab 0   • simvastatin (ZOCOR) 20 MG Tab TAKE ONE TABLET BY MOUTH ONCE DAILY IN THE EVENING 90 Tab 0   • losartan (COZAAR) 50 MG Tab Take 1 Tab by mouth 2 Times a Day. 180 Tab 3   • albuterol (VENTOLIN OR PROVENTIL) 108 (90 BASE) MCG/ACT Aero Soln inhalation aerosol Inhale 2 Puffs by mouth every 6 hours as needed for Shortness of Breath.     • aspirin EC (ECOTRIN) 81 MG TBEC Take 81 mg by mouth every day.       • Ipratropium-Albuterol (COMBIVENT INH) Inhale  by mouth.         Allergies  Advair diskus, Codeine, Lisinopril, and Sulfa drugs    ROS  Right hip pain. All other systems were reviewed and found to be negative    Family History   Problem Relation Age of Onset   • Heart Disease Father    • Heart Disease Mother    • Heart Disease Maternal Aunt    • Heart Attack Maternal Aunt    • Heart Disease Maternal Uncle        Social History     Socioeconomic History   • Marital status: Legally    Tobacco Use   • Smoking status: Current Every Day Smoker     Packs/day: 0.50     Years: 40.00     Pack years: 20.00     Types: Cigarettes   • Tobacco comment: 1/2 ppd   Vaping Use   • Vaping Use: Never used   Substance and Sexual Activity   • Alcohol use: No   • Drug use: No       Physical Exam  Vitals  BP (!) 96/53   Pulse 79   Temp 36.9 °C (98.4 °F) (Temporal)   Resp 16   Ht 1.626 m (5' 4\")   Wt 65.3 kg (144 lb)   SpO2 100%   General: Well Developed, Well Nourished, Age appropriate appearance  HEENT: Normocephalic, atraumatic  Psych: Normal mood and affect  Neck: Supple, nontender, no masses  Lungs: Breathing unlabored, No audible wheezing  Heart: Regular heart rate and rhythm  Abdomen: Soft, NT, ND  Neuro: Sensation grossly intact to BUE and BLE, moving all four extremities  Skin: Intact, no open wounds  Vascular: " 2+DP/PT, Capillary refill <2 seconds  MSK: Right hip pain and deformity      Radiographs:  DX-FEMUR-2+ RIGHT   Final Result      Comminuted and displaced subtrochanteric RIGHT proximal femur fracture.      DX-PELVIS-1 OR 2 VIEWS   Final Result      1.  Comminuted, displaced and angulated RIGHT proximal femur fracture likely subtrochanteric with intertrochanteric extension   2.  No pelvic fracture.      DX-FEMUR-2+ RIGHT    (Results Pending)   DX-PORTABLE FLUOROSCOPY < 1 HOUR    (Results Pending)       Laboratory Values  Recent Labs     07/06/22  1844   WBC 7.2   RBC 3.77*   HEMOGLOBIN 9.2*   HEMATOCRIT 29.7*   MCV 78.8*   MCH 24.4*   MCHC 31.0*   RDW 43.4   PLATELETCT 175   MPV 9.4     Recent Labs     07/06/22  1844   SODIUM 140   POTASSIUM 3.7   CHLORIDE 106   CO2 20   GLUCOSE 164*   BUN 33*             Impression: Right subtrochanteric femur fracture    Plan:We discussed the diagnosis and findings with the patient at length.  We reviewed possible non operative and operative interventions and the risks and benefits of each of these.  she had a chance to ask questions and all of these were answered to her satisfaction. The patient chose to proceed with  operative intervention. Risks and benefits of surgery were discussed which include but are not limited to bleeding, infection, neurovascular damage, malunion, nonunion, instability, limb length discrepancy, DVT, PE, MI, Stroke and death. They understand these risks and wish to proceed.

## 2022-07-07 NOTE — PROGRESS NOTES
Added nicotine patch   as well as lorazepam for anxiety  DC IV fluids  Patient already eating drinking okay

## 2022-07-08 PROBLEM — D64.9 ANEMIA: Status: ACTIVE | Noted: 2022-07-08

## 2022-07-08 PROBLEM — D69.6 THROMBOCYTOPENIA (HCC): Status: ACTIVE | Noted: 2022-07-08

## 2022-07-08 LAB
ABO + RH BLD: NORMAL
ABO GROUP BLD: NORMAL
ALBUMIN SERPL BCP-MCNC: 3.5 G/DL (ref 3.2–4.9)
ALBUMIN/GLOB SERPL: 1.3 G/DL
ALP SERPL-CCNC: 59 U/L (ref 30–99)
ALT SERPL-CCNC: 14 U/L (ref 2–50)
ANION GAP SERPL CALC-SCNC: 9 MMOL/L (ref 7–16)
AST SERPL-CCNC: 16 U/L (ref 12–45)
BARCODED ABORH UBTYP: 6200
BARCODED PRD CODE UBPRD: NORMAL
BARCODED UNIT NUM UBUNT: NORMAL
BASOPHILS # BLD AUTO: 0.2 % (ref 0–1.8)
BASOPHILS # BLD: 0.02 K/UL (ref 0–0.12)
BILIRUB SERPL-MCNC: 0.3 MG/DL (ref 0.1–1.5)
BLD GP AB SCN SERPL QL: NORMAL
BUN SERPL-MCNC: 32 MG/DL (ref 8–22)
CALCIUM SERPL-MCNC: 8.6 MG/DL (ref 8.5–10.5)
CHLORIDE SERPL-SCNC: 108 MMOL/L (ref 96–112)
CO2 SERPL-SCNC: 21 MMOL/L (ref 20–33)
COMPONENT R 8504R: NORMAL
CREAT SERPL-MCNC: 0.99 MG/DL (ref 0.5–1.4)
EOSINOPHIL # BLD AUTO: 0.14 K/UL (ref 0–0.51)
EOSINOPHIL NFR BLD: 1.7 % (ref 0–6.9)
ERYTHROCYTE [DISTWIDTH] IN BLOOD BY AUTOMATED COUNT: 45.3 FL (ref 35.9–50)
GFR SERPLBLD CREATININE-BSD FMLA CKD-EPI: 62 ML/MIN/1.73 M 2
GLOBULIN SER CALC-MCNC: 2.6 G/DL (ref 1.9–3.5)
GLUCOSE BLD STRIP.AUTO-MCNC: 164 MG/DL (ref 65–99)
GLUCOSE BLD STRIP.AUTO-MCNC: 176 MG/DL (ref 65–99)
GLUCOSE BLD STRIP.AUTO-MCNC: 188 MG/DL (ref 65–99)
GLUCOSE BLD STRIP.AUTO-MCNC: 207 MG/DL (ref 65–99)
GLUCOSE SERPL-MCNC: 188 MG/DL (ref 65–99)
HCT VFR BLD AUTO: 22.7 % (ref 37–47)
HCT VFR BLD AUTO: 23.6 % (ref 37–47)
HCT VFR BLD AUTO: 24.3 % (ref 37–47)
HGB BLD-MCNC: 6.9 G/DL (ref 12–16)
HGB BLD-MCNC: 7.2 G/DL (ref 12–16)
HGB BLD-MCNC: 7.3 G/DL (ref 12–16)
IMM GRANULOCYTES # BLD AUTO: 0.04 K/UL (ref 0–0.11)
IMM GRANULOCYTES NFR BLD AUTO: 0.5 % (ref 0–0.9)
LYMPHOCYTES # BLD AUTO: 2.02 K/UL (ref 1–4.8)
LYMPHOCYTES NFR BLD: 24.4 % (ref 22–41)
MAGNESIUM SERPL-MCNC: 2.3 MG/DL (ref 1.5–2.5)
MCH RBC QN AUTO: 24.8 PG (ref 27–33)
MCHC RBC AUTO-ENTMCNC: 30 G/DL (ref 33.6–35)
MCV RBC AUTO: 82.7 FL (ref 81.4–97.8)
MONOCYTES # BLD AUTO: 0.62 K/UL (ref 0–0.85)
MONOCYTES NFR BLD AUTO: 7.5 % (ref 0–13.4)
NEUTROPHILS # BLD AUTO: 5.43 K/UL (ref 2–7.15)
NEUTROPHILS NFR BLD: 65.7 % (ref 44–72)
NRBC # BLD AUTO: 0 K/UL
NRBC BLD-RTO: 0 /100 WBC
PLATELET # BLD AUTO: 149 K/UL (ref 164–446)
PMV BLD AUTO: 9.4 FL (ref 9–12.9)
POTASSIUM SERPL-SCNC: 3.9 MMOL/L (ref 3.6–5.5)
PRODUCT TYPE UPROD: NORMAL
PROT SERPL-MCNC: 6.1 G/DL (ref 6–8.2)
RBC # BLD AUTO: 2.94 M/UL (ref 4.2–5.4)
RH BLD: NORMAL
SODIUM SERPL-SCNC: 138 MMOL/L (ref 135–145)
UNIT STATUS USTAT: NORMAL
WBC # BLD AUTO: 8.3 K/UL (ref 4.8–10.8)

## 2022-07-08 PROCEDURE — 85025 COMPLETE CBC W/AUTO DIFF WBC: CPT

## 2022-07-08 PROCEDURE — 86901 BLOOD TYPING SEROLOGIC RH(D): CPT

## 2022-07-08 PROCEDURE — 36430 TRANSFUSION BLD/BLD COMPNT: CPT

## 2022-07-08 PROCEDURE — A9270 NON-COVERED ITEM OR SERVICE: HCPCS | Performed by: STUDENT IN AN ORGANIZED HEALTH CARE EDUCATION/TRAINING PROGRAM

## 2022-07-08 PROCEDURE — 85018 HEMOGLOBIN: CPT

## 2022-07-08 PROCEDURE — 700102 HCHG RX REV CODE 250 W/ 637 OVERRIDE(OP): Performed by: STUDENT IN AN ORGANIZED HEALTH CARE EDUCATION/TRAINING PROGRAM

## 2022-07-08 PROCEDURE — 700111 HCHG RX REV CODE 636 W/ 250 OVERRIDE (IP): Performed by: HOSPITALIST

## 2022-07-08 PROCEDURE — 80053 COMPREHEN METABOLIC PANEL: CPT

## 2022-07-08 PROCEDURE — 86923 COMPATIBILITY TEST ELECTRIC: CPT

## 2022-07-08 PROCEDURE — 770001 HCHG ROOM/CARE - MED/SURG/GYN PRIV*

## 2022-07-08 PROCEDURE — 700102 HCHG RX REV CODE 250 W/ 637 OVERRIDE(OP): Performed by: ORTHOPAEDIC SURGERY

## 2022-07-08 PROCEDURE — 94664 DEMO&/EVAL PT USE INHALER: CPT

## 2022-07-08 PROCEDURE — 36415 COLL VENOUS BLD VENIPUNCTURE: CPT

## 2022-07-08 PROCEDURE — 82962 GLUCOSE BLOOD TEST: CPT | Mod: 91

## 2022-07-08 PROCEDURE — 700105 HCHG RX REV CODE 258: Performed by: HOSPITALIST

## 2022-07-08 PROCEDURE — 86850 RBC ANTIBODY SCREEN: CPT

## 2022-07-08 PROCEDURE — A9270 NON-COVERED ITEM OR SERVICE: HCPCS | Performed by: HOSPITALIST

## 2022-07-08 PROCEDURE — 99223 1ST HOSP IP/OBS HIGH 75: CPT | Mod: AI,25 | Performed by: PHYSICAL MEDICINE & REHABILITATION

## 2022-07-08 PROCEDURE — A9270 NON-COVERED ITEM OR SERVICE: HCPCS | Performed by: ORTHOPAEDIC SURGERY

## 2022-07-08 PROCEDURE — 85014 HEMATOCRIT: CPT

## 2022-07-08 PROCEDURE — 99233 SBSQ HOSP IP/OBS HIGH 50: CPT | Performed by: HOSPITALIST

## 2022-07-08 PROCEDURE — 700111 HCHG RX REV CODE 636 W/ 250 OVERRIDE (IP): Performed by: STUDENT IN AN ORGANIZED HEALTH CARE EDUCATION/TRAINING PROGRAM

## 2022-07-08 PROCEDURE — 700102 HCHG RX REV CODE 250 W/ 637 OVERRIDE(OP): Performed by: HOSPITALIST

## 2022-07-08 PROCEDURE — P9016 RBC LEUKOCYTES REDUCED: HCPCS

## 2022-07-08 PROCEDURE — 86900 BLOOD TYPING SEROLOGIC ABO: CPT

## 2022-07-08 PROCEDURE — 700111 HCHG RX REV CODE 636 W/ 250 OVERRIDE (IP): Performed by: ORTHOPAEDIC SURGERY

## 2022-07-08 PROCEDURE — 83735 ASSAY OF MAGNESIUM: CPT

## 2022-07-08 PROCEDURE — 99406 BEHAV CHNG SMOKING 3-10 MIN: CPT | Performed by: PHYSICAL MEDICINE & REHABILITATION

## 2022-07-08 PROCEDURE — 30233N1 TRANSFUSION OF NONAUTOLOGOUS RED BLOOD CELLS INTO PERIPHERAL VEIN, PERCUTANEOUS APPROACH: ICD-10-PCS | Performed by: HOSPITALIST

## 2022-07-08 PROCEDURE — 99406 BEHAV CHNG SMOKING 3-10 MIN: CPT

## 2022-07-08 RX ADMIN — OXYCODONE HYDROCHLORIDE 10 MG: 10 TABLET ORAL at 05:05

## 2022-07-08 RX ADMIN — KETOROLAC TROMETHAMINE 15 MG: 30 INJECTION, SOLUTION INTRAMUSCULAR; INTRAVENOUS at 12:18

## 2022-07-08 RX ADMIN — ASPIRIN 81 MG: 81 TABLET, COATED ORAL at 05:05

## 2022-07-08 RX ADMIN — DOCUSATE SODIUM 100 MG: 100 CAPSULE, LIQUID FILLED ORAL at 05:05

## 2022-07-08 RX ADMIN — SENNOSIDES AND DOCUSATE SODIUM 1 TABLET: 50; 8.6 TABLET ORAL at 21:39

## 2022-07-08 RX ADMIN — POLYETHYLENE GLYCOL 3350 1 PACKET: 17 POWDER, FOR SOLUTION ORAL at 21:39

## 2022-07-08 RX ADMIN — DOCUSATE SODIUM 100 MG: 100 CAPSULE, LIQUID FILLED ORAL at 17:40

## 2022-07-08 RX ADMIN — KETOROLAC TROMETHAMINE 15 MG: 30 INJECTION, SOLUTION INTRAMUSCULAR; INTRAVENOUS at 17:52

## 2022-07-08 RX ADMIN — ACETAMINOPHEN 1000 MG: 500 TABLET ORAL at 17:40

## 2022-07-08 RX ADMIN — ACETAMINOPHEN 1000 MG: 500 TABLET ORAL at 12:18

## 2022-07-08 RX ADMIN — INSULIN HUMAN 1 UNITS: 100 INJECTION, SOLUTION PARENTERAL at 17:41

## 2022-07-08 RX ADMIN — ACETAMINOPHEN 1000 MG: 500 TABLET ORAL at 05:05

## 2022-07-08 RX ADMIN — KETOROLAC TROMETHAMINE 15 MG: 30 INJECTION, SOLUTION INTRAMUSCULAR; INTRAVENOUS at 05:04

## 2022-07-08 RX ADMIN — INSULIN HUMAN 1 UNITS: 100 INJECTION, SOLUTION PARENTERAL at 06:05

## 2022-07-08 RX ADMIN — CARVEDILOL 25 MG: 6.25 TABLET, FILM COATED ORAL at 17:40

## 2022-07-08 RX ADMIN — OXYCODONE HYDROCHLORIDE 10 MG: 10 TABLET ORAL at 14:09

## 2022-07-08 RX ADMIN — NICOTINE TRANSDERMAL SYSTEM 21 MG: 21 PATCH, EXTENDED RELEASE TRANSDERMAL at 06:00

## 2022-07-08 RX ADMIN — KETOROLAC TROMETHAMINE 15 MG: 30 INJECTION, SOLUTION INTRAMUSCULAR; INTRAVENOUS at 00:32

## 2022-07-08 RX ADMIN — SODIUM CHLORIDE 125 MG: 9 INJECTION, SOLUTION INTRAVENOUS at 17:40

## 2022-07-08 RX ADMIN — BACLOFEN 10 MG: 10 TABLET ORAL at 17:40

## 2022-07-08 RX ADMIN — HYDROMORPHONE HYDROCHLORIDE 0.5 MG: 1 INJECTION, SOLUTION INTRAMUSCULAR; INTRAVENOUS; SUBCUTANEOUS at 10:20

## 2022-07-08 RX ADMIN — INSULIN HUMAN 1 UNITS: 100 INJECTION, SOLUTION PARENTERAL at 12:29

## 2022-07-08 RX ADMIN — ACETAMINOPHEN 1000 MG: 500 TABLET ORAL at 00:31

## 2022-07-08 RX ADMIN — BACLOFEN 10 MG: 10 TABLET ORAL at 06:00

## 2022-07-08 RX ADMIN — INSULIN HUMAN 2 UNITS: 100 INJECTION, SOLUTION PARENTERAL at 21:44

## 2022-07-08 RX ADMIN — BACLOFEN 10 MG: 10 TABLET ORAL at 12:18

## 2022-07-08 ASSESSMENT — COPD QUESTIONNAIRES
COPD SCREENING SCORE: 6
HAVE YOU SMOKED AT LEAST 100 CIGARETTES IN YOUR ENTIRE LIFE: YES
DO YOU EVER COUGH UP ANY MUCUS OR PHLEGM?: YES, A FEW DAYS A WEEK OR MONTH
DURING THE PAST 4 WEEKS HOW MUCH DID YOU FEEL SHORT OF BREATH: SOME OF THE TIME
IN THE PAST 12 MONTHS DO YOU DO LESS THAN YOU USED TO BECAUSE OF YOUR BREATHING PROBLEMS: DISAGREE/UNSURE

## 2022-07-08 ASSESSMENT — ENCOUNTER SYMPTOMS
FALLS: 1
ABDOMINAL PAIN: 0
ORTHOPNEA: 0
SHORTNESS OF BREATH: 1
SORE THROAT: 0
PHOTOPHOBIA: 0
HEADACHES: 0
MYALGIAS: 1
NAUSEA: 0
HEARTBURN: 0
BACK PAIN: 0
PALPITATIONS: 0
COUGH: 1
NERVOUS/ANXIOUS: 1
BLURRED VISION: 0

## 2022-07-08 ASSESSMENT — PAIN DESCRIPTION - PAIN TYPE
TYPE: SURGICAL PAIN

## 2022-07-08 ASSESSMENT — FIBROSIS 4 INDEX: FIB4 SCORE: 1.95

## 2022-07-08 ASSESSMENT — LIFESTYLE VARIABLES: PACK_YEARS: 20

## 2022-07-08 NOTE — CARE PLAN
Problem: Pain - Standard  Goal: Alleviation of pain or a reduction in pain to the patient’s comfort goal  Outcome: Progressing     Problem: Fall Risk  Goal: Patient will remain free from falls  Outcome: Progressing   The patient is Stable - Low risk of patient condition declining or worsening    Shift Goals  Clinical Goals: Pain control, mobility and safety  Patient Goals: Pain control  Family Goals: Over all care    Progress made toward(s) clinical / shift goals:  Pain assessment and management. Encouraged patient on mobility  and IS use.     Patient is not progressing towards the following goals: N/A

## 2022-07-08 NOTE — HOSPITAL COURSE
This is a 66 year-old female with a past medical history significant for nicotine dependence, COPD, 2 L of oxygen ,ethanol abuse, type 2 diabetes mellitus, meth abuse presented on 7/6/2022 after she had a ground-level fall.      Imaging showed comminuted displaced angulated right proximal femur fracture likely subtrochanteric with intertrochanteric extension.  Orthopedic surgery was consulted, underwent surgical treatment of right subtrochanteric femur fracture with intramedullary device on 7/6/2022.  Per orthopedic surgery, patient can bear weight as tolerated.     Hospital course complicated with acute blood loss anemia secondary to surgery.  Patient hemoglobin noted to be 6.9, will transfuse 1 unit PRBC.  Iron studies obtained consistent with iron deficiency, will provide IV iron.  Will have repeat CBC tomorrow      Interval events:  -- No acute events overnight, telemetry, heart rate 68-74, blood pressure 1/8/2010/70, saturating 96% on 2 L of oxygen.  Patient stated that he does use 2 L of oxygen at home  --Patient is alert and oriented x2-3  -- Currently requiring 3 L of oxygen, provide incentive spirometer, due to, breathing treatment, Mucinex.  --Orthopedic surgery following, will obtain PT/OT  --Transfuse 1 unit of PRBC, IV iron as per pharmacy  --Platelet Noted to be low, monitor,    Plan of care has been discussed the patient, nursing staff

## 2022-07-08 NOTE — CARE PLAN
Problem: Pain - Standard  Goal: Alleviation of pain or a reduction in pain to the patient’s comfort goal  Outcome: Progressing     Problem: Fall Risk  Goal: Patient will remain free from falls  Outcome: Progressing     Problem: Knowledge Deficit - Standard  Goal: Patient and family/care givers will demonstrate understanding of plan of care, disease process/condition, diagnostic tests and medications  Outcome: Progressing     Problem: Skin Integrity  Goal: Skin integrity is maintained or improved  Outcome: Progressing   The patient is Stable - Low risk of patient condition declining or worsening    Shift Goals  Clinical Goals: pain control, monitor hgb  Patient Goals: pain control  Family Goals: N/A    Progress made toward(s) clinical / shift goals: See RN prog note    Patient is not progressing towards the following goals: N/A    Nuzhat Fan R.N.

## 2022-07-08 NOTE — PROGRESS NOTES
"Sleeping, not awakened by me     /70   Pulse 69   Temp 36.1 °C (96.9 °F) (Temporal)   Resp 18   Ht 1.626 m (5' 4\")   Wt 65.3 kg (144 lb)   SpO2 96%     Recent Labs     07/06/22  1844 07/08/22  0427 07/08/22  0626   WBC 7.2 8.3  --    RBC 3.77* 2.94*  --    HEMOGLOBIN 9.2* 7.3* 6.9*   HEMATOCRIT 29.7* 24.3* 22.7*   MCV 78.8* 82.7  --    MCH 24.4* 24.8*  --    MCHC 31.0* 30.0*  --    RDW 43.4 45.3  --    PLATELETCT 175 149*  --    MPV 9.4 9.4  --        No acute distress      POD# 2 Intramedullary nail right subtrochanteric femur fracture  Plan:  DVT Prophylaxis- TEDS/SCDs, LMWH while in hospital, ASA 81 mg PO BID as outpatient  Weight Bearing Status- WBAT  PT/OT  Antibiotics: perioperative complete  Case Coordination          "

## 2022-07-08 NOTE — PROGRESS NOTES
4 Eyes Skin Assessment Completed by AUNDREA Do and AUNDREA Edmonds.    Head WDL  Ears WDL  Nose WDL  Mouth WDL  Neck WDL  Breast/Chest WDL  Shoulder Blades WDL  Spine WDL  (R) Arm/Elbow/Hand WDL  (L) Arm/Elbow/Hand WDL  Abdomen WDL  Groin WDL  Scrotum/Coccyx/Buttocks WDL  (R) Leg Scab and Incision ( 3 sites with guaze and Tegaderm)    (L) Leg WDL  (R) Heel/Foot/Toe WDL  (L) Heel/Foot/Toe WDL          Devices In Places Pulse Ox, Landeros and SCD's      Interventions In Place Bipap Protecta-Gel, Pillows and Low Air Loss Mattress    Possible Skin Injury No    Pictures Uploaded Into Epic N/A  Wound Consult Placed N/A  RN Wound Prevention Protocol Ordered No

## 2022-07-08 NOTE — RESPIRATORY CARE
"COPD EDUCATION by COPD CLINICAL EDUCATOR  7/8/2022  at  3:35 PM by Starla Carreno RRT     Patient interviewed by COPD education team.  Patient declined to participate in full program.  A short intervention has been conducted.  A comprehensive packet including information about COPD, types of treatments to manage their disease and safe home Oxygen usage was provided and reviewed with patient at the bedside.  Patient is pending discharge to [facility].      Smoking Cessation Intervention and education completed, 5 minutes spent on smoking cessation education with patient.    Provided smoking cessation packet with \"Tips to Quit\" and brochure for \"Free Smoking Cessation Classes\".     Patient is not eligible for RPM.     COPD Screen  COPD Risk Screening  Do you have a history of COPD?: Yes  Do you have a Pulmonologist?: No  COPD Population Screener  During the past 4 weeks, how much did you feel short of breath?: Some of the time  Do you ever cough up any mucus or phlegm?: Yes, a few days a week or month  In the past 12 months, you do less than you used to because of your breathing problems: Disagree/unsure  Have you smoked at least 100 cigarettes in your entire life?: Yes  How old are you?: 60+  COPD Screening Score: 6  COPD Coordinator Recommended: Yes    COPD Assessment  COPD Clinical Specialists ONLY  COPD Education Initiated: Yes--Short Intervention (Given COPD and Smoking Cessation literature, info about virtual COPD education, has spacer. Pending discharge to [facility].)  DME Company: [employer]  DME Equipment Type: O2 @ 2 L, nebulizer  Physician Follow Up Appointment:  (declined appt assistance)  Physician Name: Willis Gomez M.D.  Pulmonologist Name: given list of local pulmonary clinics  Referrals Initiated: Yes  Pulmonary Rehab: N/A  Smoking Cessation: Yes  $ Smoking Cessation 3-10 Minutes: Symptomatic (5 min)  Smoking Pack Years: 20  Hospice: N/A  Home Health Care: N/A  [facility] Community Outreach: N/A  Geriatric Specialty " "Group: N/A  [facility]: [facility]  Private In-Home Care Agency: N/A  Is this a COPD exacerbation patient?: No  $ Demo/Eval of SVN's, MDI's and Aerosols: Yes (reviewed meds, has spacer)    PFT Results    No results found for: PFT    Meds to Beds  Would the patient like to opt in for Bedside Medication Delivery at Discharge?: No     MY COPD ACTION PLAN     It is recommended that patients and physicians /healthcare providers complete this action plan together. This plan should be discussed at each physician visit and updated as needed.    The green, yellow and red zones show groups of symptoms of COPD. This list of symptoms is not comprehensive, and you may experience other symptoms. In the \"Actions\" column, your healthcare provider has recommended actions for you to take based on your symptoms.    Patient Name: Mckenna Sewell   YOB: 1955   Last Updated on:     Green Zone:  I am doing well today Actions   •  Usual activitiy and exercise level •  Take daily medications   •  Usual amounts of cough and phlegm/mucus •  Use oxygen as prescribed   •  Sleep well at night •  Continue regular exercise/diet plan   •  Appetite is good •  At all times avoid cigarette smoke, inhaled irritants     Daily Medications (these medications are taken every day):   Tiotropium Bromide Monohydrate (Spiriva) 2 Puffs Once daily        Yellow Zone:  I am having a bad day or a COPD flare Actions   •  More breathless than usual •  Continue daily medications   •  I have less energy for my daily activities •  Use quick relief inhaler as ordered   •  Increased or thicker phlegm/mucus •  Use oxygen as prescribed   •  Using quick relief inhaler/nebulizer more often •  Get plenty of rest   •  Swelling of ankles more than usual •  Use pursed lip breathing   •  More coughing than usual •  At all times avoid cigarette smoke, inhaled irritants   •  I feel like I have a \"chest cold\"   •  Poor sleep and my symptoms woke me up   •  My " appetite is not good   •  My medicine is not helping    •  Call provider immediately if symptoms don’t improve     Continue daily medications, add rescue medications:   Albuterol/Ipratropium (Combivent, Duoneb)  Albuterol  Albuterol   2 Puffs  2.5mg via nebulizer   Every 6 hours PRN  Every 6 hours PRN       Medications to be used during a flare up, (as Discussed with Provider):           Additional Information:  Use your Combivent as directed by your doctor for increased difficulty breathing.    Keep your nebulizer clean to reduce the risk of a lung infection.     Use the spacer with your Albuterol rescue inhaler when needed.    Red Zone:  I need urgent medical care Actions   •  Severe shortness of breath even at rest •  Call 911 or seek medical care immediately   •  Not able to do any activity because of breathing    •  Fever or shaking chills    •  Feeling confused or very drowsy     •  Chest pains    •  Coughing up blood

## 2022-07-08 NOTE — CONSULTS
"    Physical Medicine and Rehabilitation Consultation              Date of initial consultation: 7/8/2022  Consulting provider: Carl Huynh MD  Reason for consultation: assess for acute inpatient rehab appropriateness  LOS: 2 Day(s)    Chief complaint: Right hip fracture    HPI: The patient is a 66 y.o. right hand dominant female with a past medical history of MAXIMILIANO, CAD, CHF, COPD, hypertension, hyperlipidemia, type 2 diabetes, nicotine dependence, 20 years of meth use (quit 10 years ago);  who presented on 7/6/2022  6:21 PM with right hip pain after mechanical ground-level fall.  Patient reported on admission that she has been feeling dizzy since starting Jardiance 3 weeks ago.  Patient stood up while outside, suddenly felt dizzy, fell on her right side, had immediate onset right hip pain, was brought to the ED for work-up and found to have x-ray evidence of comminuted displaced and angulated right proximal femur fracture likely subtrochanteric with intertrochanteric extension.  Patient was brought to the OR for surgical repair with intramedullary device by Dr. Carl Huynh on 7/6/2022.    The patient currently reports \" I feel like I am dying, I am in so much pain, I rather have babies\" patient endorses some shortness of breath, currently on 3 L nasal cannula oxygen.  Patient is 1/4 pack a day smoker.  She endorses some nausea.  Patient reports she is unable to stay on the first floor of her home, however her daughter is a  who works from home and will be able to assist as needed.  Patient complains that her heart is not in good shape, she has a pacemaker with defibrillator.  When discussing IPR requirements, patient states she is unable to tolerate 3 hours of therapy 5 days a week.    ROS  Pertinent positives are mentioned in the HPI, all others reviewed and are negative.    Social Hx:  2 SH  1 ANY, 15 steps inside  With: Adult daughter, able to assist on discharge    Employment: Not " working  Tobacco: Quarter pack per day  Alcohol: 1 beer per week  Drugs: Denies    THERAPY:  Restrictions: WBAT RLE  PT: Functional mobility   7/7: Unable to participate in gait, mod assist sit to stand    OT: ADLs  7/7: Max assist lower body dressing toileting    SLP:   None    IMAGING:  XR right femur 7/6/2022  Comminuted and displaced subtrochanteric RIGHT proximal femur fracture.    PROCEDURES:  Carl Huynh MD 7/6/2022  Surgical treatment of right subtrochanteric femur fracture with intramedullary device    PMH:  Past Medical History:   Diagnosis Date   • MAXIMILIANO (acute kidney injury) ([facility]) 5/15/2022   • Benign essential hypertension    • CAD (coronary artery disease)     MI 3/08   • CHF (congestive heart failure) ([facility])    • Congestive heart failure ([facility])    • COPD    • COPD (chronic obstructive pulmonary disease) ([facility])    • Dilated cardiomyopathy ([facility])    • History of cardiac catheterization    • Hypertension    • Mixed hyperlipidemia    • Nicotine dependence    • Nicotine dependence    • Type 2 diabetes mellitus with complication ([facility])        PSH:  Past Surgical History:   Procedure Laterality Date   • PB OPEN FIX INTER/SUBTROCH FX,IMPLNT Right 7/6/2022    Procedure: INSERTION, INTRAMEDULLARY FAVIAN, FEMUR, PROXIMAL;  Surgeon: Carl Huynh M.D.;  Location: SURGERY [facility];  Service: Orthopedics   • APPENDECTOMY     • OTHER CARDIAC SURGERY         FHX:  Family History   Problem Relation Age of Onset   • Heart Disease Father    • Heart Disease Mother    • Heart Disease Maternal Aunt    • Heart Attack Maternal Aunt    • Heart Disease Maternal Uncle        Medications:  Current Facility-Administered Medications   Medication Dose   • MD Alert...Total Body Iron Replacement per Pharmacy     • [Held by provider] enoxaparin (Lovenox) inj 40 mg  40 mg   • Pharmacy Consult Request ...Pain Management Review 1 Each  1 Each   • ondansetron (ZOFRAN) syringe/vial injection 4 mg  4 mg   • dexamethasone (DECADRON)  "injection 4 mg  4 mg   • haloperidol lactate (HALDOL) injection 1 mg  1 mg   • scopolamine (TRANSDERM-SCOP) patch 1 Patch  1 Patch   • docusate sodium (COLACE) capsule 100 mg  100 mg   • senna-docusate (PERICOLACE or SENOKOT S) 8.6-50 MG per tablet 1 Tablet  1 Tablet   • senna-docusate (PERICOLACE or SENOKOT S) 8.6-50 MG per tablet 1 Tablet  1 Tablet   • polyethylene glycol/lytes (MIRALAX) PACKET 1 Packet  1 Packet   • magnesium hydroxide (MILK OF MAGNESIA) suspension 30 mL  30 mL   • bisacodyl (DULCOLAX) suppository 10 mg  10 mg   • sodium phosphate (Fleet) enema 133 mL  1 Each   • ketorolac (TORADOL) injection 15 mg  15 mg    Followed by   • [START ON 7/10/2022] ibuprofen (MOTRIN) tablet 800 mg  800 mg   • oxyCODONE immediate-release (ROXICODONE) tablet 5 mg  5 mg    Or   • oxyCODONE immediate release (ROXICODONE) tablet 10 mg  10 mg    Or   • HYDROmorphone (Dilaudid) injection 0.5 mg  0.5 mg   • insulin regular (HumuLIN R,NovoLIN R) injection  1-6 Units    And   • dextrose 50% (D50W) injection 25 g  25 g   • nicotine (NICODERM) 21 MG/24HR 21 mg  21 mg    And   • nicotine polacrilex (NICORETTE) 2 MG piece 2 mg  2 mg   • acetaminophen (TYLENOL) tablet 1,000 mg  1,000 mg    Followed by   • [START ON 7/12/2022] acetaminophen (Tylenol) tablet 650 mg  650 mg   • baclofen (LIORESAL) tablet 10 mg  10 mg   • albuterol inhaler 2 Puff  2 Puff   • aspirin EC (ECOTRIN) tablet 81 mg  81 mg   • carvedilol (COREG) tablet 25 mg  25 mg   • tiotropium (Spiriva Respimat) 2.5 mcg/Act inhalation spray 5 mcg  5 mcg   • HYDROmorphone (Dilaudid) injection 0.5 mg  0.5 mg       Allergies:  Allergies   Allergen Reactions   • Advair Diskus      Rash/hives   • Codeine    • Lisinopril    • Sulfa Drugs          Physical Exam:  Vitals: /70   Pulse 69   Temp 36.1 °C (96.9 °F) (Temporal)   Resp 18   Ht 1.626 m (5' 4\")   Wt 65.3 kg (144 lb)   SpO2 96%   Gen: NAD  Head: NC/AT  Eyes/ Nose/ Mouth: moist mucous membranes  Cardio: RRR, good " distal perfusion, warm extremities  Pulm: normal respiratory effort, no cyanosis   Abd: Soft NTND, negative borborygmi   Ext: No peripheral edema. No calf tenderness. No clubbing.    Mental status: answers questions appropriately follows commands  Speech: fluent, no aphasia or dysarthria    Motor:      Upper Extremity  Myotome R L   Shoulder flexion C5 5 5   Elbow flexion C5 5 5   Wrist extension C6 5 5   Elbow extension C7 5 5   Finger flexion C8 5 5   Finger abduction T1 5 5     Lower Extremity Myotome R L   Hip flexion L2 1/5 5   Knee extension L3 2/5 5   Ankle dorsiflexion L4 5 5   Toe extension L5 5 5   Ankle plantarflexion S1 5 5       Sensory:   intact to light touch through out    Labs: Reviewed and significant for   Recent Labs     07/06/22  1844 07/07/22  1722 07/08/22  0427 07/08/22  0626   RBC 3.77*  --  2.94*  --    HEMOGLOBIN 9.2*  --  7.3* 6.9*   HEMATOCRIT 29.7*  --  24.3* 22.7*   PLATELETCT 175  --  149*  --    IRON  --  21*  --   --    FERRITIN  --  27.7  --   --    TOTIRONBC  --  371  --   --      Recent Labs     07/06/22  1844 07/07/22  0300 07/08/22  0427   SODIUM 140 136 138   POTASSIUM 3.7 3.9 3.9   CHLORIDE 106 104 108   CO2 20 22 21   GLUCOSE 164* 215* 188*   BUN 33* 26* 32*   CREATININE 1.60* 1.17 0.99   CALCIUM 8.6 8.7 8.6     Recent Results (from the past 24 hour(s))   POCT glucose device results    Collection Time: 07/07/22 10:54 AM   Result Value Ref Range    POC Glucose, Blood 244 (H) 65 - 99 mg/dL   IRON/TOTAL IRON BIND    Collection Time: 07/07/22  5:22 PM   Result Value Ref Range    Iron 21 (L) 40 - 170 ug/dL    Total Iron Binding 371 250 - 450 ug/dL    Unsat Iron Binding 350 110 - 370 ug/dL    % Saturation 6 (L) 15 - 55 %   FERRITIN    Collection Time: 07/07/22  5:22 PM   Result Value Ref Range    Ferritin 27.7 10.0 - 291.0 ng/mL   TSH WITH REFLEX TO FT4    Collection Time: 07/07/22  5:22 PM   Result Value Ref Range    TSH 0.080 (L) 0.380 - 5.330 uIU/mL   HEMOGLOBIN A1C     Collection Time: 07/07/22  5:22 PM   Result Value Ref Range    Glycohemoglobin 7.4 (H) 4.0 - 5.6 %    Est Avg Glucose 166 mg/dL   FREE THYROXINE    Collection Time: 07/07/22  5:22 PM   Result Value Ref Range    Free T-4 1.30 0.93 - 1.70 ng/dL   POCT glucose device results    Collection Time: 07/07/22  6:49 PM   Result Value Ref Range    POC Glucose, Blood 155 (H) 65 - 99 mg/dL   POCT glucose device results    Collection Time: 07/07/22  8:12 PM   Result Value Ref Range    POC Glucose, Blood 193 (H) 65 - 99 mg/dL   CBC WITH DIFFERENTIAL    Collection Time: 07/08/22  4:27 AM   Result Value Ref Range    WBC 8.3 4.8 - 10.8 K/uL    RBC 2.94 (L) 4.20 - 5.40 M/uL    Hemoglobin 7.3 (L) 12.0 - 16.0 g/dL    Hematocrit 24.3 (L) 37.0 - 47.0 %    MCV 82.7 81.4 - 97.8 fL    MCH 24.8 (L) 27.0 - 33.0 pg    MCHC 30.0 (L) 33.6 - 35.0 g/dL    RDW 45.3 35.9 - 50.0 fL    Platelet Count 149 (L) 164 - 446 K/uL    MPV 9.4 9.0 - 12.9 fL    Neutrophils-Polys 65.70 44.00 - 72.00 %    Lymphocytes 24.40 22.00 - 41.00 %    Monocytes 7.50 0.00 - 13.40 %    Eosinophils 1.70 0.00 - 6.90 %    Basophils 0.20 0.00 - 1.80 %    Immature Granulocytes 0.50 0.00 - 0.90 %    Nucleated RBC 0.00 /100 WBC    Neutrophils (Absolute) 5.43 2.00 - 7.15 K/uL    Lymphs (Absolute) 2.02 1.00 - 4.80 K/uL    Monos (Absolute) 0.62 0.00 - 0.85 K/uL    Eos (Absolute) 0.14 0.00 - 0.51 K/uL    Baso (Absolute) 0.02 0.00 - 0.12 K/uL    Immature Granulocytes (abs) 0.04 0.00 - 0.11 K/uL    NRBC (Absolute) 0.00 K/uL   Comp Metabolic Panel    Collection Time: 07/08/22  4:27 AM   Result Value Ref Range    Sodium 138 135 - 145 mmol/L    Potassium 3.9 3.6 - 5.5 mmol/L    Chloride 108 96 - 112 mmol/L    Co2 21 20 - 33 mmol/L    Glucose 188 (H) 65 - 99 mg/dL    Bun 32 (H) 8 - 22 mg/dL    Creatinine 0.99 0.50 - 1.40 mg/dL    Calcium 8.6 8.5 - 10.5 mg/dL    Anion Gap 9.0 7.0 - 16.0    AST(SGOT) 16 12 - 45 U/L    ALT(SGPT) 14 2 - 50 U/L    Alkaline Phosphatase 59 30 - 99 U/L    Total  Bilirubin 0.3 0.1 - 1.5 mg/dL    Albumin 3.5 3.2 - 4.9 g/dL    Total Protein 6.1 6.0 - 8.2 g/dL    Globulin 2.6 1.9 - 3.5 g/dL    A-G Ratio 1.3 g/dL   MAGNESIUM    Collection Time: 07/08/22  4:27 AM   Result Value Ref Range    Magnesium 2.3 1.5 - 2.5 mg/dL   ESTIMATED GFR    Collection Time: 07/08/22  4:27 AM   Result Value Ref Range    GFR (CKD-EPI) 62 >60 mL/min/1.73 m 2   POCT glucose device results    Collection Time: 07/08/22  6:05 AM   Result Value Ref Range    POC Glucose, Blood 188 (H) 65 - 99 mg/dL   HEMOGLOBIN AND HEMATOCRIT    Collection Time: 07/08/22  6:26 AM   Result Value Ref Range    Hemoglobin 6.9 (L) 12.0 - 16.0 g/dL    Hematocrit 22.7 (L) 37.0 - 47.0 %         ASSESSMENT:  Patient is a 66 y.o. female admitted with right hip fracture now s/p repair with intramedullary device by Dr. Carl Huynh MD on 7/6/2022    Rehabilitation: Impaired ADLs and mobility  Patient is not a candidate for IPR    All cases are subject to administrative review and recommendations may change    Additional Recommendations:  -Recommend SNF placement  -Patient is declining IPR, states she does not have the tolerance to participate in the required 3 hours of therapy 5 days a week.  Additionally, patient's home environment is not conducive to her recovery as she is unable to stay on the first floor.  All bedrooms and bathrooms are up 15 stairs.  -Patient will benefit from 2 to 3 weeks at SNF until she is able to ambulate and navigate stairs at min assist.  -PMR will sign off, please reconsult or reach out via Voalte if further evaluation or medical management is requested    Medical Complexity:    Right hip pain  -Secondary to surgery 7/6/2022  -Managed by primary team with Tylenol, Roxicodone, Toradol, ibuprofen baclofen    Tobacco abuse  - Tobacco abuse cessation counseling given today for ~5 min as it pertains to vascular disease, heart attack, stroke, wound healing, and pulmonary disease.       DVT PPX:  Lovenox      Thank you for allowing us to participate in the care of this patient.     Patient was seen for 82 minutes on unit/floor of which > 50% of time was spent on counseling and coordination of care regarding the above, including prognosis, risk reduction, benefits of treatment, and options for next stage of care.    George Landin, DO   Physical Medicine and Rehabilitation     Please note that this dictation was created using voice recognition software. I have made every reasonable attempt to correct obvious errors, but there may be errors of grammar and possibly content that I did not discover before finalizing the note.

## 2022-07-08 NOTE — PROGRESS NOTES
Assumed care of patient at 0645. Bedside report received. Assessment complete.  AA&Ox4. Denies SOB, pt currently on supplemental O2.    Reporting 7/10 pain. Medications, repositioning, and ice packs used for pain control. Educated patient regarding pharmacologic and non pharmacologic modalities for pain management.    Skin per flow sheets.    Tolerating diabetic diet.     Pt has small amount of nausea this morning but stated she was fine and did not need any medication.     Landeros in place. Last BM PTA.    PT/OT ordered. Pt turns self in bed. Pillows used for positioning.     Plan of care discussed, all questions answered. Educated on the importance of calling before getting OOB and pt verbalizes understanding. Educated regarding importance of oral care. Oral care kit at bedside. Call light is within reach, treaded slipper socks on, bed in lowest/ locked position, hourly rounding in place, all needs met at this time.      Nuzhat Fan R.N.

## 2022-07-08 NOTE — PROGRESS NOTES
NOC HOSPITALIST CROSS COVER    Notified by RN regarding patient's hemoglobin dropping from 9.2/29.7 to 7.3/24.3. The patient has no signs of bleeding at this time. Her vital signs remain stable. Her dressings having some dried bloody drainage, but no new blood noted.    Vitals:    07/08/22 0459   BP: 113/39   Pulse: 68   Resp: 17   Temp: 36.4 °C (97.6 °F)   SpO2: 98%     Plan:  #Iron deficiency anemia vs acute blood loss anemia  -Repeat H&H to confirm once now  -Trend H&H Q4 hours  -Occult stool  -Iron noted to be 21. Pharmacy dosed iron replacement ordered        -----------------------------------------------------------------------------------------------------------    Electronically signed by:  MAU Bello, AGACNP-BC

## 2022-07-08 NOTE — THERAPY
Missed Therapy     Patient Name: Mckenna Sewell  Age:  66 y.o., Sex:  female  Medical Record #: [id number]  Today's Date: 7/8/2022 07/08/22 1005   Interdisciplinary Plan of Care Collaboration   Collaboration Comments Attempted therapy follow up session this am. Pt about to receive blood transfusion due to low hgb. Will follow up as able

## 2022-07-08 NOTE — PROGRESS NOTES
Received report from  lab about patient hemoglobin that was 9.2 on 7/6 and went  down to 7.3 on  7/8.

## 2022-07-08 NOTE — PROGRESS NOTES
[facility] Medicine Daily Progress Note    Date of Service  7/8/2022    Chief Complaint  Mckenna Sewell is a 66 y.o. female admitted 7/6/2022 with fall    Hospital Course    66-year-old female with history of alcoholism, diabetes, smoking and meth abuse presented 7/6 with fall and hip pain patient has some dizziness and left on her right hip, came to the emergency and x-ray showed comminuted displaced inguinal right paroxysmal femoral fracture, patient was evaluated by orthopedic and underwent surgery on 7/7.    Patient has history of meth abuse and urine drug screen was positive for meth, since patient has history of dizziness we will order echo for evaluation    Interval Problem Update  -Patient was evaluated after surgery, patient having some pain on her hip  -We will order echo for dizziness and fall  -Hemoglobin dropped to 9 from 11, no signs of bleeding, continue monitoring.  -Improving kidney function    I have discussed this patient's plan of care and discharge plan at IDT rounds today with Case Management, Nursing, Nursing leadership, and other members of the IDT team.    Consultants/Specialty  orthopedics    Code Status  Full Code    Disposition  Patient is not medically cleared for discharge.   Anticipate discharge to to skilled nursing facility.  I have placed the appropriate orders for post-discharge needs.    Review of Systems  Review of Systems   Constitutional: Positive for malaise/fatigue.   HENT: Negative for congestion and sore throat.    Eyes: Negative for blurred vision and photophobia.   Respiratory: Positive for cough and shortness of breath.    Cardiovascular: Negative for chest pain, palpitations and orthopnea.   Gastrointestinal: Negative for abdominal pain, heartburn and nausea.   Musculoskeletal: Positive for falls, joint pain and myalgias. Negative for back pain.   Neurological: Negative for headaches.   Psychiatric/Behavioral: The patient is nervous/anxious.    All other systems reviewed  and are negative.       Physical Exam  Temp:  [36.1 °C (96.9 °F)-37.1 °C (98.7 °F)] 36.1 °C (96.9 °F)  Pulse:  [68-86] 69  Resp:  [16-18] 18  BP: (113-128)/(39-70) 118/70  SpO2:  [81 %-98 %] 96 %    Physical Exam  Vitals and nursing note reviewed.   Constitutional:       Appearance: She is ill-appearing.   HENT:      Head: Normocephalic and atraumatic.   Eyes:      Extraocular Movements: Extraocular movements intact.   Cardiovascular:      Rate and Rhythm: Tachycardia present.      Heart sounds: No murmur heard.  Pulmonary:      Effort: No respiratory distress.      Breath sounds: No wheezing.   Abdominal:      General: There is no distension.      Tenderness: There is no abdominal tenderness. There is no guarding.   Musculoskeletal:         General: Tenderness present.      Cervical back: Neck supple.      Comments: Decreased ROm in the right leg 2/2 pain    Neurological:      Mental Status: She is alert and oriented to person, place, and time.      Cranial Nerves: No cranial nerve deficit.      Motor: No weakness.         Fluids    Intake/Output Summary (Last 24 hours) at 7/8/2022 1155  Last data filed at 7/8/2022 0842  Gross per 24 hour   Intake 360 ml   Output 700 ml   Net -340 ml       Laboratory  Recent Labs     07/06/22 1844 07/08/22  0427 07/08/22  0626 07/08/22  1024   WBC 7.2 8.3  --   --    RBC 3.77* 2.94*  --   --    HEMOGLOBIN 9.2* 7.3* 6.9* 7.2*   HEMATOCRIT 29.7* 24.3* 22.7* 23.6*   MCV 78.8* 82.7  --   --    MCH 24.4* 24.8*  --   --    MCHC 31.0* 30.0*  --   --    RDW 43.4 45.3  --   --    PLATELETCT 175 149*  --   --    MPV 9.4 9.4  --   --      Recent Labs     07/06/22  1844 07/07/22  0300 07/08/22  0427   SODIUM 140 136 138   POTASSIUM 3.7 3.9 3.9   CHLORIDE 106 104 108   CO2 20 22 21   GLUCOSE 164* 215* 188*   BUN 33* 26* 32*   CREATININE 1.60* 1.17 0.99   CALCIUM 8.6 8.7 8.6                   Imaging  DX-FEMUR-2+ RIGHT   Final Result      Intraoperative image(s) as described.      DX-PORTABLE  FLUOROSCOPY < 1 HOUR   Final Result      Portable fluoroscopy utilized for 43 seconds.         INTERPRETING LOCATION: [address] [place] [address], [place], [id number]      DX-FEMUR-2+ RIGHT   Final Result      Comminuted and displaced subtrochanteric RIGHT proximal femur fracture.      DX-PELVIS-1 OR 2 VIEWS   Final Result      1.  Comminuted, displaced and angulated RIGHT proximal femur fracture likely subtrochanteric with intertrochanteric extension   2.  No pelvic fracture.           Assessment/Plan  * Femur fracture ([facility])  Assessment & Plan  Patient still feeling lightheaded prior to presentation, continue IV fluid, obtain BNP, echocardiogram ordered, pending  --Orthopedic surgery was consulted, patient underwent surgery, PT/OT, DVT prophylaxis      Thrombocytopenia ([facility])  Assessment & Plan  At 149, will monitor     Anemia  Assessment & Plan  Acute blood loss anemia, will transfuse 1 unit of prbc      Methamphetamine abuse ([facility])  Assessment & Plan  Reportedly used methamphetamine for over 20 years over 10 years ago.  No recent use.  Send U tox    MAXIMILIANO (acute kidney injury) ([facility])  Assessment & Plan  Likely secondary to MAXIMILIANO from prerenal etiology, monitor, avoid nephrotoxin  Today creatinine at 1.99    Type 2 diabetes mellitus with complication ([facility])- (present on admission)  Assessment & Plan  Uncontrolled and A1c more than 8  Repeat A1c  Sliding scale and start with Lantus if blood sugar more than 180    Hyperlipidemia  Assessment & Plan  Continue Zocor    COPD (chronic obstructive pulmonary disease) ([facility])- (present on admission)  Assessment & Plan  History of smoking   No exacerbation   continue Spiriva and albuterol as needed  Oxygen as needed, keep O2 over 90%       VTE prophylaxis: scd

## 2022-07-08 NOTE — PROGRESS NOTES
@ 5760 paged out for the on call hospital ist due to patient hemoglobin which was 9.2 on the 7/6 that drops to 7.3 to date.       @ 0683 received call back from CALE Kwong.     The PA was updated about the hemoglobin results.  New orders for labs were placed.

## 2022-07-09 ENCOUNTER — APPOINTMENT (OUTPATIENT)
Dept: RADIOLOGY | Facility: MEDICAL CENTER | Age: 67
DRG: 481 | End: 2022-07-09
Attending: STUDENT IN AN ORGANIZED HEALTH CARE EDUCATION/TRAINING PROGRAM
Payer: MEDICARE

## 2022-07-09 LAB
ALBUMIN SERPL BCP-MCNC: 3.5 G/DL (ref 3.2–4.9)
BUN SERPL-MCNC: 20 MG/DL (ref 8–22)
CALCIUM SERPL-MCNC: 9 MG/DL (ref 8.5–10.5)
CHLORIDE SERPL-SCNC: 101 MMOL/L (ref 96–112)
CO2 SERPL-SCNC: 24 MMOL/L (ref 20–33)
CREAT SERPL-MCNC: 0.74 MG/DL (ref 0.5–1.4)
EKG IMPRESSION: NORMAL
ERYTHROCYTE [DISTWIDTH] IN BLOOD BY AUTOMATED COUNT: 43.4 FL (ref 35.9–50)
GFR SERPLBLD CREATININE-BSD FMLA CKD-EPI: 88 ML/MIN/1.73 M 2
GLUCOSE BLD STRIP.AUTO-MCNC: 140 MG/DL (ref 65–99)
GLUCOSE BLD STRIP.AUTO-MCNC: 152 MG/DL (ref 65–99)
GLUCOSE BLD STRIP.AUTO-MCNC: 153 MG/DL (ref 65–99)
GLUCOSE SERPL-MCNC: 185 MG/DL (ref 65–99)
HCT VFR BLD AUTO: 30.1 % (ref 37–47)
HGB BLD-MCNC: 9.5 G/DL (ref 12–16)
MCH RBC QN AUTO: 25.4 PG (ref 27–33)
MCHC RBC AUTO-ENTMCNC: 31.6 G/DL (ref 33.6–35)
MCV RBC AUTO: 80.5 FL (ref 81.4–97.8)
PHOSPHATE SERPL-MCNC: 2.4 MG/DL (ref 2.5–4.5)
PLATELET # BLD AUTO: 158 K/UL (ref 164–446)
PMV BLD AUTO: 9.5 FL (ref 9–12.9)
POTASSIUM SERPL-SCNC: 4.2 MMOL/L (ref 3.6–5.5)
RBC # BLD AUTO: 3.74 M/UL (ref 4.2–5.4)
SODIUM SERPL-SCNC: 134 MMOL/L (ref 135–145)
WBC # BLD AUTO: 8 K/UL (ref 4.8–10.8)

## 2022-07-09 PROCEDURE — 80069 RENAL FUNCTION PANEL: CPT

## 2022-07-09 PROCEDURE — A9270 NON-COVERED ITEM OR SERVICE: HCPCS | Performed by: HOSPITALIST

## 2022-07-09 PROCEDURE — A9270 NON-COVERED ITEM OR SERVICE: HCPCS | Performed by: ORTHOPAEDIC SURGERY

## 2022-07-09 PROCEDURE — 700102 HCHG RX REV CODE 250 W/ 637 OVERRIDE(OP): Performed by: HOSPITALIST

## 2022-07-09 PROCEDURE — A9270 NON-COVERED ITEM OR SERVICE: HCPCS | Performed by: STUDENT IN AN ORGANIZED HEALTH CARE EDUCATION/TRAINING PROGRAM

## 2022-07-09 PROCEDURE — 700102 HCHG RX REV CODE 250 W/ 637 OVERRIDE(OP): Performed by: STUDENT IN AN ORGANIZED HEALTH CARE EDUCATION/TRAINING PROGRAM

## 2022-07-09 PROCEDURE — 97530 THERAPEUTIC ACTIVITIES: CPT | Mod: CQ

## 2022-07-09 PROCEDURE — 85027 COMPLETE CBC AUTOMATED: CPT

## 2022-07-09 PROCEDURE — 770001 HCHG ROOM/CARE - MED/SURG/GYN PRIV*

## 2022-07-09 PROCEDURE — 99233 SBSQ HOSP IP/OBS HIGH 50: CPT | Performed by: HOSPITALIST

## 2022-07-09 PROCEDURE — 36415 COLL VENOUS BLD VENIPUNCTURE: CPT

## 2022-07-09 PROCEDURE — 700111 HCHG RX REV CODE 636 W/ 250 OVERRIDE (IP): Performed by: ORTHOPAEDIC SURGERY

## 2022-07-09 PROCEDURE — 93005 ELECTROCARDIOGRAM TRACING: CPT | Performed by: HOSPITALIST

## 2022-07-09 PROCEDURE — 700111 HCHG RX REV CODE 636 W/ 250 OVERRIDE (IP): Performed by: HOSPITALIST

## 2022-07-09 PROCEDURE — 82962 GLUCOSE BLOOD TEST: CPT | Mod: 91

## 2022-07-09 PROCEDURE — 700111 HCHG RX REV CODE 636 W/ 250 OVERRIDE (IP)

## 2022-07-09 PROCEDURE — 700102 HCHG RX REV CODE 250 W/ 637 OVERRIDE(OP): Performed by: ORTHOPAEDIC SURGERY

## 2022-07-09 PROCEDURE — 93010 ELECTROCARDIOGRAM REPORT: CPT | Performed by: INTERNAL MEDICINE

## 2022-07-09 RX ORDER — PREDNISONE 20 MG/1
40 TABLET ORAL DAILY
Status: DISCONTINUED | OUTPATIENT
Start: 2022-07-09 | End: 2022-07-10

## 2022-07-09 RX ORDER — NALOXONE HYDROCHLORIDE 0.4 MG/ML
INJECTION, SOLUTION INTRAMUSCULAR; INTRAVENOUS; SUBCUTANEOUS
Status: COMPLETED
Start: 2022-07-09 | End: 2022-07-09

## 2022-07-09 RX ORDER — IPRATROPIUM BROMIDE AND ALBUTEROL SULFATE 2.5; .5 MG/3ML; MG/3ML
3 SOLUTION RESPIRATORY (INHALATION)
Status: DISCONTINUED | OUTPATIENT
Start: 2022-07-09 | End: 2022-07-09

## 2022-07-09 RX ORDER — BACLOFEN 10 MG/1
10 TABLET ORAL 3 TIMES DAILY PRN
Status: DISCONTINUED | OUTPATIENT
Start: 2022-07-09 | End: 2022-07-13 | Stop reason: HOSPADM

## 2022-07-09 RX ORDER — IPRATROPIUM BROMIDE AND ALBUTEROL SULFATE 2.5; .5 MG/3ML; MG/3ML
3 SOLUTION RESPIRATORY (INHALATION)
Status: DISCONTINUED | OUTPATIENT
Start: 2022-07-09 | End: 2022-07-13 | Stop reason: HOSPADM

## 2022-07-09 RX ORDER — NALOXONE HYDROCHLORIDE 0.4 MG/ML
0.4 INJECTION, SOLUTION INTRAMUSCULAR; INTRAVENOUS; SUBCUTANEOUS ONCE
Status: COMPLETED | OUTPATIENT
Start: 2022-07-09 | End: 2022-07-09

## 2022-07-09 RX ADMIN — KETOROLAC TROMETHAMINE 15 MG: 30 INJECTION, SOLUTION INTRAMUSCULAR; INTRAVENOUS at 06:00

## 2022-07-09 RX ADMIN — NALOXONE HYDROCHLORIDE 0.4 MG: 0.4 INJECTION, SOLUTION INTRAMUSCULAR; INTRAVENOUS; SUBCUTANEOUS at 14:01

## 2022-07-09 RX ADMIN — KETOROLAC TROMETHAMINE 15 MG: 30 INJECTION, SOLUTION INTRAMUSCULAR; INTRAVENOUS at 00:18

## 2022-07-09 RX ADMIN — ACETAMINOPHEN 1000 MG: 500 TABLET ORAL at 06:01

## 2022-07-09 RX ADMIN — CARVEDILOL 25 MG: 6.25 TABLET, FILM COATED ORAL at 09:25

## 2022-07-09 RX ADMIN — INSULIN HUMAN 1 UNITS: 100 INJECTION, SOLUTION PARENTERAL at 06:11

## 2022-07-09 RX ADMIN — ASPIRIN 81 MG: 81 TABLET, COATED ORAL at 06:00

## 2022-07-09 RX ADMIN — TIOTROPIUM BROMIDE INHALATION SPRAY 5 MCG: 3.12 SPRAY, METERED RESPIRATORY (INHALATION) at 06:02

## 2022-07-09 RX ADMIN — BACLOFEN 10 MG: 10 TABLET ORAL at 06:00

## 2022-07-09 RX ADMIN — NICOTINE TRANSDERMAL SYSTEM 21 MG: 21 PATCH, EXTENDED RELEASE TRANSDERMAL at 06:01

## 2022-07-09 RX ADMIN — OXYCODONE HYDROCHLORIDE 10 MG: 10 TABLET ORAL at 09:25

## 2022-07-09 RX ADMIN — ACETAMINOPHEN 1000 MG: 500 TABLET ORAL at 00:18

## 2022-07-09 RX ADMIN — DOCUSATE SODIUM 100 MG: 100 CAPSULE, LIQUID FILLED ORAL at 06:00

## 2022-07-09 RX ADMIN — INSULIN HUMAN 1 UNITS: 100 INJECTION, SOLUTION PARENTERAL at 13:31

## 2022-07-09 ASSESSMENT — ENCOUNTER SYMPTOMS
NERVOUS/ANXIOUS: 1
BACK PAIN: 0
ABDOMINAL PAIN: 0
HEADACHES: 0
COUGH: 1
PALPITATIONS: 0
SHORTNESS OF BREATH: 1
HEARTBURN: 0
ORTHOPNEA: 0
BLURRED VISION: 0
FALLS: 1
NAUSEA: 0
PHOTOPHOBIA: 0
MYALGIAS: 1
SORE THROAT: 0

## 2022-07-09 ASSESSMENT — COGNITIVE AND FUNCTIONAL STATUS - GENERAL
WALKING IN HOSPITAL ROOM: A LOT
MOVING FROM LYING ON BACK TO SITTING ON SIDE OF FLAT BED: UNABLE
STANDING UP FROM CHAIR USING ARMS: A LOT
MOVING TO AND FROM BED TO CHAIR: A LOT
SUGGESTED CMS G CODE MODIFIER MOBILITY: CL
TURNING FROM BACK TO SIDE WHILE IN FLAT BAD: A LOT
CLIMB 3 TO 5 STEPS WITH RAILING: TOTAL
MOBILITY SCORE: 10

## 2022-07-09 ASSESSMENT — PAIN DESCRIPTION - PAIN TYPE: TYPE: SURGICAL PAIN

## 2022-07-09 ASSESSMENT — GAIT ASSESSMENTS: GAIT LEVEL OF ASSIST: UNABLE TO PARTICIPATE

## 2022-07-09 NOTE — PROGRESS NOTES
Hospital Medicine Daily Progress Note    Date of Service  7/9/2022    Chief Complaint  Mckenna Sewell is a 66 y.o. female admitted 7/6/2022 with fall    Hospital Course  This is a 66 year-old female with a past medical history significant for nicotine dependence, COPD, 2 L of oxygen ,ethanol abuse, type 2 diabetes mellitus, meth abuse presented on 7/6/2022 after she had a ground-level fall.      Imaging showed comminuted displaced angulated right proximal femur fracture likely subtrochanteric with intertrochanteric extension.  Orthopedic surgery was consulted, underwent surgical treatment of right subtrochanteric femur fracture with intramedullary device on 7/6/2022.  Per orthopedic surgery, patient can bear weight as tolerated.     Hospital course complicated with acute blood loss anemia secondary to surgery.  Patient hemoglobin noted to be 6.9, will transfuse 1 unit PRBC.  Iron studies obtained consistent with iron deficiency, will provide IV iron.  Will have repeat CBC tomorrow    Interval Problem Update  -Patient was evaluated after surgery, patient having some pain on her hip  -Hemoglobin dropped to 9 from 11, no signs of bleeding, continue monitoring.  -Improving kidney function    7/9:  --No acute events overnight, HR 77-80;  BP is noted to be elevated and has been requiring 3 L of O2    -- Echo showing diastolic dysfunction;  RSVP at 40s ; will provide 20 mg pf IV lasix X 1   --s/p 1 unit of PRBC transfusion    -- H&H at  9.5  Platelet at 158, will monitor; Not actively bleeding   Na at 134  PT/OT has evaluated pending medical antibiotic requested; SNF referral has been placed    I have discussed this patient's plan of care and discharge plan at IDT rounds today with Case Management, Nursing, Nursing leadership, and other members of the IDT team.    Consultants/Specialty  orthopedics    Code Status  Full Code    Disposition  Patient is not medically cleared for discharge.   Anticipate discharge to to skilled  nursing facility.  I have placed the appropriate orders for post-discharge needs.    Review of Systems  Review of Systems   Constitutional: Positive for malaise/fatigue.   HENT: Negative for congestion and sore throat.    Eyes: Negative for blurred vision and photophobia.   Respiratory: Positive for cough and shortness of breath.    Cardiovascular: Negative for chest pain, palpitations and orthopnea.   Gastrointestinal: Negative for abdominal pain, heartburn and nausea.   Musculoskeletal: Positive for falls, joint pain and myalgias. Negative for back pain.   Neurological: Negative for headaches.   Psychiatric/Behavioral: The patient is nervous/anxious.    All other systems reviewed and are negative.       Physical Exam  Temp:  [35.9 °C (96.6 °F)-36.3 °C (97.3 °F)] 36.1 °C (97 °F)  Pulse:  [74-80] 78  Resp:  [16-18] 16  BP: (122-153)/(49-80) 141/80  SpO2:  [91 %-96 %] 92 %    Physical Exam  Vitals and nursing note reviewed.   Constitutional:       Appearance: She is ill-appearing.   HENT:      Head: Normocephalic and atraumatic.   Eyes:      Extraocular Movements: Extraocular movements intact.   Cardiovascular:      Rate and Rhythm: Tachycardia present.      Heart sounds: No murmur heard.  Pulmonary:      Effort: No respiratory distress.      Breath sounds: No wheezing.   Abdominal:      General: There is no distension.      Tenderness: There is no abdominal tenderness. There is no guarding.   Musculoskeletal:         General: Tenderness present.      Cervical back: Neck supple.      Comments: Decreased ROm in the right leg 2/2 pain    Neurological:      Mental Status: She is alert and oriented to person, place, and time.      Cranial Nerves: No cranial nerve deficit.      Motor: No weakness.         Fluids    Intake/Output Summary (Last 24 hours) at 7/9/2022 1226  Last data filed at 7/9/2022 0600  Gross per 24 hour   Intake 980 ml   Output 1500 ml   Net -520 ml       Laboratory  Recent Labs     07/06/22  5447  07/08/22  0427 07/08/22  0626 07/08/22  1024 07/09/22  0218   WBC 7.2 8.3  --   --  8.0   RBC 3.77* 2.94*  --   --  3.74*   HEMOGLOBIN 9.2* 7.3* 6.9* 7.2* 9.5*   HEMATOCRIT 29.7* 24.3* 22.7* 23.6* 30.1*   MCV 78.8* 82.7  --   --  80.5*   MCH 24.4* 24.8*  --   --  25.4*   MCHC 31.0* 30.0*  --   --  31.6*   RDW 43.4 45.3  --   --  43.4   PLATELETCT 175 149*  --   --  158*   MPV 9.4 9.4  --   --  9.5     Recent Labs     07/07/22  0300 07/08/22  0427 07/09/22  0218   SODIUM 136 138 134*   POTASSIUM 3.9 3.9 4.2   CHLORIDE 104 108 101   CO2 22 21 24   GLUCOSE 215* 188* 185*   BUN 26* 32* 20   CREATININE 1.17 0.99 0.74   CALCIUM 8.7 8.6 9.0                   Imaging  DX-FEMUR-2+ RIGHT   Final Result      Intraoperative image(s) as described.      DX-PORTABLE FLUOROSCOPY < 1 HOUR   Final Result      Portable fluoroscopy utilized for 43 seconds.         INTERPRETING LOCATION: [address] [place] [address], [address]      DX-FEMUR-2+ RIGHT   Final Result      Comminuted and displaced subtrochanteric RIGHT proximal femur fracture.      DX-PELVIS-1 OR 2 VIEWS   Final Result      1.  Comminuted, displaced and angulated RIGHT proximal femur fracture likely subtrochanteric with intertrochanteric extension   2.  No pelvic fracture.           Assessment/Plan  * Femur fracture ([facility])  Assessment & Plan  Patient still feeling lightheaded prior to presentation, continue IV fluid, obtain BNP, echocardiogram ordered, pending  --Orthopedic surgery was consulted, patient underwent surgery   -- PT/OT recs post-acute   , DVT prophylaxis      Thrombocytopenia ([facility])  Assessment & Plan  At 149, will monitor     Anemia  Assessment & Plan  Acute blood loss anemia,  S/p PRBC transfusion on 7/8   -- Today hemoglobin at 9.5    Methamphetamine abuse ([facility])  Assessment & Plan  Reportedly used methamphetamine for over 20 years over 10 years ago.  No recent use.      MAXIMILIANO (acute kidney injury) ([facility])  Assessment & Plan  Likely secondary to MAXIMILIANO from prerenal etiology,  monitor, avoid nephrotoxin  Today creatinine at 0.74    Type 2 diabetes mellitus with complication ([facility])- (present on admission)  Assessment & Plan  Uncontrolled and A1c more than 8  ISS, hypoglycemia protocol, accu checks ac and hs     Hyperlipidemia  Assessment & Plan  Continue Zocor    COPD (chronic obstructive pulmonary disease) ([facility])- (present on admission)  Assessment & Plan  History of smoking   No exacerbation   continue Spiriva and albuterol as needed  Oxygen as needed, keep O2 over 90%       VTE prophylaxis:  lovenox   I have performed a physical exam and reviewed and updated ROS and Plan today (7/9/2022). In review of yesterday's note (7/8/2022), there are no changes except as documented above.

## 2022-07-09 NOTE — DISCHARGE PLANNING
Please review the consult from Dr. Landin regarding [ost acute recommendations.  TCC will no longer follow.  Please reach out to myself with any interval changes/questions.

## 2022-07-09 NOTE — PROGRESS NOTES
Assumed care of patient at 0645. Bedside report received. Assessment complete.    AA&Ox4. Denies SOB.    Reporting 5/10 pain. Declined pharmacologic intervention at this time.   Educated patient regarding pharmacologic and non pharmacologic modalities for pain management.    Skin per flow sheets.    Tolerating diabetic diet. Denies N/V.    Landeros in place with urine output as charted. Last BM 7/9/2022.    Pt can stand and pivot to commode  X 1 w/ FWW.    Plan of care discussed, all questions answered. Educated on the importance of calling before getting OOB and pt verbalizes understanding. Educated regarding importance of oral care. Oral care kit at bedside. Call light is within reach, treaded slipper socks on, bed in lowest/ locked position, hourly rounding in place, all needs met at this time.    Nuzhat Fan R.N.

## 2022-07-09 NOTE — CARE PLAN
Problem: Pain - Standard  Goal: Alleviation of pain or a reduction in pain to the patient’s comfort goal  Outcome: Progressing  Note: Pain managed with medication and rest      Problem: Fall Risk  Goal: Patient will remain free from falls  Outcome: Progressing  Note: Fall precautions in place. Bed alarm on, room close to nurses station   The patient is Stable - Low risk of patient condition declining or worsening    Shift Goals  Clinical Goals: pain control, safety, monitor labs  Patient Goals: pain control, rest, bath/shower  Family Goals: N/A    Progress made toward(s) clinical / shift goals:        Patient is not progressing towards the following goals:

## 2022-07-09 NOTE — THERAPY
Physical Therapy   Daily Treatment     Patient Name: Mckenna Sewell  Age:  66 y.o., Sex:  female  Medical Record #: [id number]  Today's Date: 7/9/2022     Precautions  Precautions: Fall Risk;Weight Bearing As Tolerated Right Lower Extremity    Assessment    Pt was pleasant, odd affect and seems lethargic potentially from pain pills. She would wake up and acknowledge therapist and engage in conversation than close eyes moan and demonstrate writhing type movements. Per RN close to baseline and she is more lethargic from fatigue today. She reached EOb with mod to maxA and extra time and effort able to hold self up at this time. Therapist deferred further activity due to being more lethargic and safety. Rn notified and will continue to follow while in house.     Plan    Continue current treatment plan.    DC Equipment Recommendations: Unable to determine at this time  Discharge Recommendations: Recommend post-acute placement for additional physical therapy services prior to discharge home         07/09/22 1159   Neurological Concerns   Comments writhing type movements in facial expression   Balance   Sitting Balance (Static) Poor +   Sitting Balance (Dynamic) Poor   Gait Analysis   Gait Level Of Assist Unable to Participate   Bed Mobility    Supine to Sit Moderate Assist   Sit to Supine Moderate Assist   Scooting Maximal Assist   Skilled Intervention Verbal Cuing;Sequencing   Comments extra time very lethargic would start to move than lay back down and than eventually made to EOB but too lethargic deferred further mobility at this time   Functional Mobility   Sit to Stand Unable to Participate   Short Term Goals    Short Term Goal # 1 Pt will perform bed mobility with supervision to improve independence in 6 visits.   Goal Outcome # 1 goal not met   Short Term Goal # 2 Pt will perform transfers with FWW and supervision to progress mobility in 6 visits.   Goal Outcome # 2 Goal not met   Short Term Goal # 3 Pt will ambulate  100ft with FWW and supervision to progress mobility in 6 visits.   Goal Outcome # 3 Goal not met   Short Term Goal # 4 Pt will tolerate at least 8 stairs with min A to progress mobility in 6 visits.   Goal Outcome # 4 Goal not met

## 2022-07-09 NOTE — CARE PLAN
Problem: Pain - Standard  Goal: Alleviation of pain or a reduction in pain to the patient’s comfort goal  Outcome: Progressing     Problem: Fall Risk  Goal: Patient will remain free from falls  Outcome: Progressing     Problem: Knowledge Deficit - Standard  Goal: Patient and family/care givers will demonstrate understanding of plan of care, disease process/condition, diagnostic tests and medications  Outcome: Progressing     Problem: Skin Integrity  Goal: Skin integrity is maintained or improved  Outcome: Progressing     Problem: Knowledge Deficit - COPD  Goal: Patient/significant other demonstrates understanding of disease process, utilization of the Action Plan, medications and discharge instruction  Outcome: Progressing     Problem: Risk for Infection - COPD  Goal: Patient will remain free from signs and symptoms of infection  Outcome: Progressing     Problem: Nutrition - Advanced  Goal: Patient will display progressive weight gain toward goal have adequate food and fluid intake  Outcome: Progressing     Problem: Ineffective Airway Clearance  Goal: Patient will maintain patent airway with clear/clearing breath sounds  Outcome: Progressing     Problem: Impaired Gas Exchange  Goal: Patient will demonstrate improved ventilation and adequate oxygenation and participate in treatment regimen within the level of ability/situation.  Outcome: Progressing     Problem: Risk for Aspiration  Goal: Patient's risk for aspiration will be absent or decrease  Outcome: Progressing     Problem: Self Care  Goal: Patient will have the ability to perform ADLs independently or with assistance (bathe, groom, dress, toilet and feed)  Outcome: Progressing   The patient is Stable - Low risk of patient condition declining or worsening    Shift Goals  Clinical Goals: pain control, increase mobility  Patient Goals: pain control  Family Goals: N/A    Progress made toward(s) clinical / shift goals: Medication given for pain control. RN OOB to  commode and chair today.    Patient is not progressing towards the following goals: N/A    Nuzhat Fan R.N.

## 2022-07-09 NOTE — PROGRESS NOTES
"Pt asleep upon hourly rounding. Pt not responsive to voice.     RN sternal rubbed pt. Pt opened eyes slightly and and fell right back asleep. Pt lethargic. RN asked pt to keep her eyes open. Pt drifted back to sleep. RN asked pt if she could hold the water bottle. Pt grabbed water bottle and started to drop it right away due to falling back to sleep. MD Grant notified and requested order for narcan. MD also gave order for CT and ABGs. Telephone orders placed.     RN at bedside with narcan and administering as ordered. Pt becoming awake and alert. Phlebotomists at bedside to draw ABGs as ordered. Pt stated \"what are you giving me\". RN educated pt that she is receiving narcan and educated pt on what narcan is. RN also educated pt that the phlebotomist is attempting to draw ABGs and educated on what ABGs are. MD notified and CT cancelled.     RN asked pt if she had taken anything other than the oxy that was given this morning. Pt stated \"no I didn't take anything, I don't take drugs\". Pt then stated \"you guys are giving me something and trying to kill me. RN educated pt again on situation and the reason she is receiving narcan.     Pt grimacing as if she is in pain. RN offered pt tylenol and prednisone that was due. Pt refused and stated she isn't taking anything from us because we are trying to kill her.     Pt stated that she needed to use the restroom. RN offered to help pt to commode. Pt stated she did not want our help because we are trying to kill her. RN asked pt if she would like someone else to come help her. Pt stated she needed to go now. RN assisted pt to commode. Pt continues to accuse staff of trying to kill her and called family members telling them we are trying to kill her.      Nuzhat Fan R.N.    "

## 2022-07-09 NOTE — PROGRESS NOTES
"On phone, events of morning noted and d/w RN.    BP (!) 147/77   Pulse 72   Temp 36.1 °C (97 °F) (Temporal)   Resp 15   Ht 1.626 m (5' 4\")   Wt 66.1 kg (145 lb 11.6 oz)   SpO2 92%     Recent Labs     07/06/22  1844 07/08/22  0427 07/08/22  0626 07/08/22  1024 07/09/22  0218   WBC 7.2 8.3  --   --  8.0   RBC 3.77* 2.94*  --   --  3.74*   HEMOGLOBIN 9.2* 7.3* 6.9* 7.2* 9.5*   HEMATOCRIT 29.7* 24.3* 22.7* 23.6* 30.1*   MCV 78.8* 82.7  --   --  80.5*   MCH 24.4* 24.8*  --   --  25.4*   MCHC 31.0* 30.0*  --   --  31.6*   RDW 43.4 45.3  --   --  43.4   PLATELETCT 175 149*  --   --  158*   MPV 9.4 9.4  --   --  9.5       No acute distress  Surgical dressing is clean, dry, and intact. Patient clearly fires tibialis anterior, EHL, and gastrocnemius/soleus. Sensation is intact to light touch throughout superficial peroneal, deep peroneal, tibial, saphenous, and sural nerve distributions. Strong and palpable 2+ dorsalis pedis and posterior tibial pulses with capillary refill less than 2 seconds. No lower leg tenderness or discomfort.        POD# 3 Intramedullary nail right subtrochanteric femur fracture  Plan:  DVT Prophylaxis- TEDS/SCDs, LMWH while in hospital, ASA 81 mg PO BID as outpatient  Weight Bearing Status- WBAT  PT/OT  Antibiotics: perioperative complete  Case Coordination: Follow-Up: needs appointment with Dr. Huynh at [facility] at 10-14 days post-op for re-evaluation, staple removal and radiographs.            "

## 2022-07-10 LAB
GLUCOSE BLD STRIP.AUTO-MCNC: 130 MG/DL (ref 65–99)
GLUCOSE BLD STRIP.AUTO-MCNC: 216 MG/DL (ref 65–99)
GLUCOSE BLD STRIP.AUTO-MCNC: 218 MG/DL (ref 65–99)
GLUCOSE BLD STRIP.AUTO-MCNC: 251 MG/DL (ref 65–99)

## 2022-07-10 PROCEDURE — A9270 NON-COVERED ITEM OR SERVICE: HCPCS | Performed by: STUDENT IN AN ORGANIZED HEALTH CARE EDUCATION/TRAINING PROGRAM

## 2022-07-10 PROCEDURE — 700102 HCHG RX REV CODE 250 W/ 637 OVERRIDE(OP): Performed by: HOSPITALIST

## 2022-07-10 PROCEDURE — A9270 NON-COVERED ITEM OR SERVICE: HCPCS | Performed by: HOSPITALIST

## 2022-07-10 PROCEDURE — 99232 SBSQ HOSP IP/OBS MODERATE 35: CPT | Performed by: HOSPITALIST

## 2022-07-10 PROCEDURE — A9270 NON-COVERED ITEM OR SERVICE: HCPCS | Performed by: ORTHOPAEDIC SURGERY

## 2022-07-10 PROCEDURE — 700111 HCHG RX REV CODE 636 W/ 250 OVERRIDE (IP): Performed by: ORTHOPAEDIC SURGERY

## 2022-07-10 PROCEDURE — 700102 HCHG RX REV CODE 250 W/ 637 OVERRIDE(OP): Performed by: STUDENT IN AN ORGANIZED HEALTH CARE EDUCATION/TRAINING PROGRAM

## 2022-07-10 PROCEDURE — 700102 HCHG RX REV CODE 250 W/ 637 OVERRIDE(OP): Performed by: ORTHOPAEDIC SURGERY

## 2022-07-10 PROCEDURE — 770001 HCHG ROOM/CARE - MED/SURG/GYN PRIV*

## 2022-07-10 PROCEDURE — 700111 HCHG RX REV CODE 636 W/ 250 OVERRIDE (IP): Performed by: HOSPITALIST

## 2022-07-10 PROCEDURE — 82962 GLUCOSE BLOOD TEST: CPT | Mod: 91

## 2022-07-10 RX ADMIN — DOCUSATE SODIUM 100 MG: 100 CAPSULE, LIQUID FILLED ORAL at 16:49

## 2022-07-10 RX ADMIN — ACETAMINOPHEN 1000 MG: 500 TABLET ORAL at 23:04

## 2022-07-10 RX ADMIN — ACETAMINOPHEN 1000 MG: 500 TABLET ORAL at 00:54

## 2022-07-10 RX ADMIN — BACLOFEN 10 MG: 10 TABLET ORAL at 00:57

## 2022-07-10 RX ADMIN — INSULIN HUMAN 2 UNITS: 100 INJECTION, SOLUTION PARENTERAL at 11:43

## 2022-07-10 RX ADMIN — INSULIN HUMAN 3 UNITS: 100 INJECTION, SOLUTION PARENTERAL at 20:27

## 2022-07-10 RX ADMIN — ACETAMINOPHEN 1000 MG: 500 TABLET ORAL at 11:43

## 2022-07-10 RX ADMIN — INSULIN HUMAN 2 UNITS: 100 INJECTION, SOLUTION PARENTERAL at 16:49

## 2022-07-10 RX ADMIN — DOCUSATE SODIUM 100 MG: 100 CAPSULE, LIQUID FILLED ORAL at 06:31

## 2022-07-10 RX ADMIN — PREDNISONE 40 MG: 20 TABLET ORAL at 06:31

## 2022-07-10 RX ADMIN — CARVEDILOL 25 MG: 6.25 TABLET, FILM COATED ORAL at 16:49

## 2022-07-10 RX ADMIN — TIOTROPIUM BROMIDE INHALATION SPRAY 5 MCG: 3.12 SPRAY, METERED RESPIRATORY (INHALATION) at 06:31

## 2022-07-10 RX ADMIN — ASPIRIN 81 MG: 81 TABLET, COATED ORAL at 06:31

## 2022-07-10 RX ADMIN — ACETAMINOPHEN 1000 MG: 500 TABLET ORAL at 16:49

## 2022-07-10 RX ADMIN — NICOTINE TRANSDERMAL SYSTEM 21 MG: 21 PATCH, EXTENDED RELEASE TRANSDERMAL at 06:30

## 2022-07-10 RX ADMIN — CARVEDILOL 25 MG: 6.25 TABLET, FILM COATED ORAL at 07:52

## 2022-07-10 RX ADMIN — ENOXAPARIN SODIUM 40 MG: 40 INJECTION SUBCUTANEOUS at 10:28

## 2022-07-10 ASSESSMENT — ENCOUNTER SYMPTOMS
SORE THROAT: 0
BACK PAIN: 0
MYALGIAS: 0
ORTHOPNEA: 0
PHOTOPHOBIA: 0
DIARRHEA: 0
FALLS: 1
PALPITATIONS: 0
COUGH: 1
BLURRED VISION: 0
FOCAL WEAKNESS: 0
NERVOUS/ANXIOUS: 1
SHORTNESS OF BREATH: 1
HEADACHES: 0
ABDOMINAL PAIN: 0

## 2022-07-10 ASSESSMENT — PAIN DESCRIPTION - PAIN TYPE
TYPE: ACUTE PAIN;SURGICAL PAIN
TYPE: ACUTE PAIN;SURGICAL PAIN
TYPE: ACUTE PAIN

## 2022-07-10 NOTE — CARE PLAN
Problem: Pain - Standard  Goal: Alleviation of pain or a reduction in pain to the patient’s comfort goal  Outcome: Progressing  Note: Pain managed with mediation and rest     Problem: Skin Integrity  Goal: Skin integrity is maintained or improved  Outcome: Progressing  Note: Skin intact besides incision site to right hip.    The patient is Stable - Low risk of patient condition declining or worsening    Shift Goals  Clinical Goals: pain control, safety  Patient Goals: rest, pain control  Family Goals: N/A    Progress made toward(s) clinical / shift goals:        Patient is not progressing towards the following goals:

## 2022-07-10 NOTE — PROGRESS NOTES
"Resting comfortably. Pain well controlled. Denies CP/dyspnea/fever. OK to JIMENEZ catalan from Ortho standpoint. Dispo pending PT/OT/Med re-evals. Recc: all meals OOB, Ambulate with assist (CNA) TID    BP (!) 154/78   Pulse 77   Temp 36.3 °C (97.4 °F) (Temporal)   Resp 16   Ht 1.626 m (5' 4\")   Wt 66.1 kg (145 lb 11.6 oz)   SpO2 95%     Recent Labs     07/08/22  0427 07/08/22  0626 07/08/22  1024 07/09/22  0218   WBC 8.3  --   --  8.0   RBC 2.94*  --   --  3.74*   HEMOGLOBIN 7.3* 6.9* 7.2* 9.5*   HEMATOCRIT 24.3* 22.7* 23.6* 30.1*   MCV 82.7  --   --  80.5*   MCH 24.8*  --   --  25.4*   MCHC 30.0*  --   --  31.6*   RDW 45.3  --   --  43.4   PLATELETCT 149*  --   --  158*   MPV 9.4  --   --  9.5       No acute distress  Surgical dressing is clean, dry, and intact. Patient clearly fires tibialis anterior, EHL, and gastrocnemius/soleus. Sensation is intact to light touch throughout superficial peroneal, deep peroneal, tibial, saphenous, and sural nerve distributions. Strong and palpable 2+ dorsalis pedis and posterior tibial pulses with capillary refill less than 2 seconds. No lower leg tenderness or discomfort.    POD# 4 S/P Intramedullary nail right subtrochanteric femur fracture  Plan:  DVT Prophylaxis- TEDS/SCDs, LMWH while in hospital, ASA 81 mg PO BID as outpatient  Weight Bearing Status- WBAT  PT/OT  Antibiotics: perioperative complete  Case Coordination: Follow-Up: needs appointment with Dr. Huynh at [facility] at 10-14 days post-op for re-evaluation, staple removal and radiographs.            "

## 2022-07-10 NOTE — PROGRESS NOTES
Hospital Medicine Daily Progress Note    Date of Service  7/10/2022    Chief Complaint  Mckenna Sewell is a 66 y.o. female admitted 7/6/2022 with fall    Hospital Course  This is a 66 year-old female with a past medical history significant for nicotine dependence, COPD, 2 L of oxygen ,ethanol abuse, type 2 diabetes mellitus, meth abuse presented on 7/6/2022 after she had a ground-level fall.      Imaging showed comminuted displaced angulated right proximal femur fracture likely subtrochanteric with intertrochanteric extension.  Orthopedic surgery was consulted, underwent surgical treatment of right subtrochanteric femur fracture with intramedullary device on 7/6/2022.  Per orthopedic surgery, patient can bear weight as tolerated.     Hospital course complicated with acute blood loss anemia secondary to surgery.  Patient hemoglobin noted to be 6.9, will transfuse 1 unit PRBC.  Iron studies obtained consistent with iron deficiency, will provide IV iron.  Will have repeat CBC tomorrow    Interval Problem Update  -Patient was evaluated after surgery, patient having some pain on her hip  -Hemoglobin dropped to 9 from 11, no signs of bleeding, continue monitoring.  -Improving kidney function    7/9:  --No acute events overnight, HR 77-80;  BP is noted to be elevated and has been requiring 3 L of O2    -- Echo showing diastolic dysfunction;  RSVP at 40s ; will provide 20 mg pf IV lasix X 1   --s/p 1 unit of PRBC transfusion    -- H&H at  9.5  Platelet at 158, will monitor; Not actively bleeding   Na at 134  PT/OT has evaluated pending medical antibiotic requested; SNF referral has been placed    7/10::  --No acute events overnight, laying in bed, vital sign has been stable  -- BP elevated at 154/78 , requiring on 2 L of O2   -- na at 134, monitor   --Hemoglobin ar 9.5; platelet at 158; not actively bleeding   PT/OT recs post-acute;  snf and physiatry referral in place  Medically stable to be discharged      Consultants/Specialty  orthopedics    Code Status  Full Code    Disposition  Patient is not medically cleared for discharge.   Anticipate discharge to to skilled nursing facility.  I have placed the appropriate orders for post-discharge needs.    Review of Systems  Review of Systems   Constitutional: Positive for malaise/fatigue.   HENT: Negative for congestion and sore throat.    Eyes: Negative for blurred vision and photophobia.   Respiratory: Positive for cough and shortness of breath.    Cardiovascular: Negative for chest pain, palpitations and orthopnea.   Gastrointestinal: Negative for abdominal pain and diarrhea.   Genitourinary: Negative for urgency.   Musculoskeletal: Positive for falls and joint pain. Negative for back pain and myalgias.   Neurological: Negative for focal weakness and headaches.   Psychiatric/Behavioral: The patient is nervous/anxious.    All other systems reviewed and are negative.       Physical Exam  Temp:  [36 °C (96.8 °F)-36.3 °C (97.4 °F)] 36.3 °C (97.4 °F)  Pulse:  [72-88] 77  Resp:  [15-17] 16  BP: (147-160)/(77-84) 154/78  SpO2:  [92 %-97 %] 95 %    Physical Exam  Vitals and nursing note reviewed.   Constitutional:       Appearance: She is ill-appearing.   HENT:      Head: Normocephalic and atraumatic.   Eyes:      Extraocular Movements: Extraocular movements intact.   Cardiovascular:      Rate and Rhythm: Normal rate.      Heart sounds: No murmur heard.  Pulmonary:      Effort: No respiratory distress.      Breath sounds: No wheezing.   Abdominal:      General: Bowel sounds are normal. There is no distension.      Palpations: Abdomen is soft.      Tenderness: There is no guarding.   Musculoskeletal:         General: Tenderness present.      Cervical back: Neck supple.      Comments: Decreased ROm in the right leg 2/2 pain    Neurological:      Mental Status: She is alert and oriented to person, place, and time.      Cranial Nerves: No cranial nerve deficit.      Motor: No  weakness.   Psychiatric:         Mood and Affect: Mood normal.         Fluids    Intake/Output Summary (Last 24 hours) at 7/10/2022 1309  Last data filed at 7/9/2022 1800  Gross per 24 hour   Intake --   Output 700 ml   Net -700 ml       Laboratory  Recent Labs     07/08/22  0427 07/08/22  0626 07/08/22  1024 07/09/22  0218   WBC 8.3  --   --  8.0   RBC 2.94*  --   --  3.74*   HEMOGLOBIN 7.3* 6.9* 7.2* 9.5*   HEMATOCRIT 24.3* 22.7* 23.6* 30.1*   MCV 82.7  --   --  80.5*   MCH 24.8*  --   --  25.4*   MCHC 30.0*  --   --  31.6*   RDW 45.3  --   --  43.4   PLATELETCT 149*  --   --  158*   MPV 9.4  --   --  9.5     Recent Labs     07/08/22  0427 07/09/22  0218   SODIUM 138 134*   POTASSIUM 3.9 4.2   CHLORIDE 108 101   CO2 21 24   GLUCOSE 188* 185*   BUN 32* 20   CREATININE 0.99 0.74   CALCIUM 8.6 9.0                   Imaging  DX-FEMUR-2+ RIGHT   Final Result      Intraoperative image(s) as described.      DX-PORTABLE FLUOROSCOPY < 1 HOUR   Final Result      Portable fluoroscopy utilized for 43 seconds.         INTERPRETING LOCATION: [address] [place] [address], [id number]      DX-FEMUR-2+ RIGHT   Final Result      Comminuted and displaced subtrochanteric RIGHT proximal femur fracture.      DX-PELVIS-1 OR 2 VIEWS   Final Result      1.  Comminuted, displaced and angulated RIGHT proximal femur fracture likely subtrochanteric with intertrochanteric extension   2.  No pelvic fracture.           Assessment/Plan  * Femur fracture (HCC)  Assessment & Plan  Patient still feeling lightheaded prior to presentation, continue IV fluid, obtain BNP, echocardiogram ordered, pending  --Orthopedic surgery was consulted, patient underwent surgery   -- PT/OT recs post-acute   , DVT prophylaxis      Thrombocytopenia (HCC)  Assessment & Plan  At 158, will monitor     Anemia  Assessment & Plan  Acute blood loss anemia,  S/p PRBC transfusion on 7/8   -- Today hemoglobin at 9.5    Methamphetamine abuse (HCC)  Assessment & Plan  Reportedly used  methamphetamine for over 20 years over 10 years ago.  No recent use.      MAXIMILIANO (acute kidney injury) (HCC)  Assessment & Plan  Likely secondary to MAXIMILIANO from prerenal etiology, monitor, avoid nephrotoxin  Today creatinine at 0.74    Type 2 diabetes mellitus with complication (HCC)- (present on admission)  Assessment & Plan  Uncontrolled and A1c more than 8  ISS, hypoglycemia protocol, accu checks ac and hs     Hyperlipidemia  Assessment & Plan  Continue Zocor    COPD (chronic obstructive pulmonary disease) (HCC)- (present on admission)  Assessment & Plan  History of smoking   No exacerbation   continue Spiriva and albuterol as needed  Oxygen as needed, keep O2 over 90%       VTE prophylaxis:  lovenox     I have performed a physical exam and reviewed and updated ROS and Plan today (7/10/2022). In review of yesterday's note (7/9/2022), there are no changes except as documented above.

## 2022-07-10 NOTE — CARE PLAN
The patient is Stable - Low risk of patient condition declining or worsening    Shift Goals  Clinical Goals: Pain management, safety, mobility  Patient Goals: Pain management, rest  Family Goals: N/A    Progress made toward(s) clinical / shift goals:    Problem: Fall Risk  Goal: Patient will remain free from falls  Outcome: Progressing  Note: Fall precautions in place including bed alarm. Patient calls appropriately for assistance.      Problem: Knowledge Deficit - Standard  Goal: Patient and family/care givers will demonstrate understanding of plan of care, disease process/condition, diagnostic tests and medications  Outcome: Progressing  Note: Discussed POC with patient including pain management, mobility, weight-bearing status, and catalan catheter. The patient indicated understanding and did not have any questions at the time, but reinforcement is needed.

## 2022-07-10 NOTE — PROGRESS NOTES
Pt refusing BG checks, medications, and food. Pt states she doesn't trust us and that we tried to kill her. MD Grant notified. Will continue to monitor.    Nuzhat Fan R.N.

## 2022-07-11 LAB
ALBUMIN SERPL BCP-MCNC: 3.6 G/DL (ref 3.2–4.9)
BUN SERPL-MCNC: 20 MG/DL (ref 8–22)
CALCIUM SERPL-MCNC: 9.2 MG/DL (ref 8.5–10.5)
CHLORIDE SERPL-SCNC: 98 MMOL/L (ref 96–112)
CO2 SERPL-SCNC: 25 MMOL/L (ref 20–33)
CREAT SERPL-MCNC: 0.57 MG/DL (ref 0.5–1.4)
ERYTHROCYTE [DISTWIDTH] IN BLOOD BY AUTOMATED COUNT: 43.4 FL (ref 35.9–50)
GFR SERPLBLD CREATININE-BSD FMLA CKD-EPI: 98 ML/MIN/1.73 M 2
GLUCOSE BLD STRIP.AUTO-MCNC: 155 MG/DL (ref 65–99)
GLUCOSE BLD STRIP.AUTO-MCNC: 175 MG/DL (ref 65–99)
GLUCOSE BLD STRIP.AUTO-MCNC: 234 MG/DL (ref 65–99)
GLUCOSE BLD STRIP.AUTO-MCNC: 244 MG/DL (ref 65–99)
GLUCOSE SERPL-MCNC: 167 MG/DL (ref 65–99)
HCT VFR BLD AUTO: 31.3 % (ref 37–47)
HGB BLD-MCNC: 10.1 G/DL (ref 12–16)
MCH RBC QN AUTO: 25.6 PG (ref 27–33)
MCHC RBC AUTO-ENTMCNC: 32.3 G/DL (ref 33.6–35)
MCV RBC AUTO: 79.2 FL (ref 81.4–97.8)
PHOSPHATE SERPL-MCNC: 2.5 MG/DL (ref 2.5–4.5)
PLATELET # BLD AUTO: 255 K/UL (ref 164–446)
PMV BLD AUTO: 9.9 FL (ref 9–12.9)
POTASSIUM SERPL-SCNC: 3.9 MMOL/L (ref 3.6–5.5)
PROCALCITONIN SERPL-MCNC: 0.12 NG/ML
RBC # BLD AUTO: 3.95 M/UL (ref 4.2–5.4)
SODIUM SERPL-SCNC: 134 MMOL/L (ref 135–145)
WBC # BLD AUTO: 13.6 K/UL (ref 4.8–10.8)

## 2022-07-11 PROCEDURE — 84145 PROCALCITONIN (PCT): CPT

## 2022-07-11 PROCEDURE — A9270 NON-COVERED ITEM OR SERVICE: HCPCS | Performed by: HOSPITALIST

## 2022-07-11 PROCEDURE — 85027 COMPLETE CBC AUTOMATED: CPT

## 2022-07-11 PROCEDURE — 97112 NEUROMUSCULAR REEDUCATION: CPT

## 2022-07-11 PROCEDURE — 97116 GAIT TRAINING THERAPY: CPT

## 2022-07-11 PROCEDURE — 700102 HCHG RX REV CODE 250 W/ 637 OVERRIDE(OP): Performed by: HOSPITALIST

## 2022-07-11 PROCEDURE — 700102 HCHG RX REV CODE 250 W/ 637 OVERRIDE(OP): Performed by: STUDENT IN AN ORGANIZED HEALTH CARE EDUCATION/TRAINING PROGRAM

## 2022-07-11 PROCEDURE — 700111 HCHG RX REV CODE 636 W/ 250 OVERRIDE (IP): Performed by: ORTHOPAEDIC SURGERY

## 2022-07-11 PROCEDURE — 99232 SBSQ HOSP IP/OBS MODERATE 35: CPT | Performed by: HOSPITALIST

## 2022-07-11 PROCEDURE — 770001 HCHG ROOM/CARE - MED/SURG/GYN PRIV*

## 2022-07-11 PROCEDURE — 700111 HCHG RX REV CODE 636 W/ 250 OVERRIDE (IP): Performed by: HOSPITALIST

## 2022-07-11 PROCEDURE — 700102 HCHG RX REV CODE 250 W/ 637 OVERRIDE(OP): Performed by: ORTHOPAEDIC SURGERY

## 2022-07-11 PROCEDURE — 80069 RENAL FUNCTION PANEL: CPT

## 2022-07-11 PROCEDURE — 700105 HCHG RX REV CODE 258: Performed by: HOSPITALIST

## 2022-07-11 PROCEDURE — A9270 NON-COVERED ITEM OR SERVICE: HCPCS | Performed by: ORTHOPAEDIC SURGERY

## 2022-07-11 PROCEDURE — 97530 THERAPEUTIC ACTIVITIES: CPT

## 2022-07-11 PROCEDURE — A9270 NON-COVERED ITEM OR SERVICE: HCPCS | Performed by: STUDENT IN AN ORGANIZED HEALTH CARE EDUCATION/TRAINING PROGRAM

## 2022-07-11 PROCEDURE — 36415 COLL VENOUS BLD VENIPUNCTURE: CPT

## 2022-07-11 PROCEDURE — 82962 GLUCOSE BLOOD TEST: CPT | Mod: 91

## 2022-07-11 RX ORDER — GABAPENTIN 300 MG/1
300 CAPSULE ORAL 3 TIMES DAILY
Status: DISCONTINUED | OUTPATIENT
Start: 2022-07-11 | End: 2022-07-13 | Stop reason: HOSPADM

## 2022-07-11 RX ORDER — LOSARTAN POTASSIUM 50 MG/1
50 TABLET ORAL
Status: DISCONTINUED | OUTPATIENT
Start: 2022-07-11 | End: 2022-07-13 | Stop reason: HOSPADM

## 2022-07-11 RX ADMIN — BACLOFEN 10 MG: 10 TABLET ORAL at 09:28

## 2022-07-11 RX ADMIN — INSULIN HUMAN 1 UNITS: 100 INJECTION, SOLUTION PARENTERAL at 06:31

## 2022-07-11 RX ADMIN — GABAPENTIN 300 MG: 300 CAPSULE ORAL at 11:53

## 2022-07-11 RX ADMIN — BACLOFEN 10 MG: 10 TABLET ORAL at 18:31

## 2022-07-11 RX ADMIN — CARVEDILOL 25 MG: 6.25 TABLET, FILM COATED ORAL at 18:24

## 2022-07-11 RX ADMIN — SENNOSIDES AND DOCUSATE SODIUM 1 TABLET: 50; 8.6 TABLET ORAL at 20:21

## 2022-07-11 RX ADMIN — CARVEDILOL 25 MG: 6.25 TABLET, FILM COATED ORAL at 09:28

## 2022-07-11 RX ADMIN — ACETAMINOPHEN 1000 MG: 500 TABLET ORAL at 06:27

## 2022-07-11 RX ADMIN — GABAPENTIN 300 MG: 300 CAPSULE ORAL at 18:24

## 2022-07-11 RX ADMIN — NICOTINE TRANSDERMAL SYSTEM 21 MG: 21 PATCH, EXTENDED RELEASE TRANSDERMAL at 06:27

## 2022-07-11 RX ADMIN — SODIUM CHLORIDE 250 MG: 9 INJECTION, SOLUTION INTRAVENOUS at 20:14

## 2022-07-11 RX ADMIN — INSULIN HUMAN 1 UNITS: 100 INJECTION, SOLUTION PARENTERAL at 21:33

## 2022-07-11 RX ADMIN — LOSARTAN POTASSIUM 50 MG: 50 TABLET, FILM COATED ORAL at 09:30

## 2022-07-11 RX ADMIN — ENOXAPARIN SODIUM 40 MG: 40 INJECTION SUBCUTANEOUS at 09:31

## 2022-07-11 RX ADMIN — ASPIRIN 81 MG: 81 TABLET, COATED ORAL at 06:26

## 2022-07-11 RX ADMIN — DOCUSATE SODIUM 100 MG: 100 CAPSULE, LIQUID FILLED ORAL at 18:25

## 2022-07-11 RX ADMIN — OXYCODONE 5 MG: 5 TABLET ORAL at 09:28

## 2022-07-11 RX ADMIN — INSULIN HUMAN 2 UNITS: 100 INJECTION, SOLUTION PARENTERAL at 18:02

## 2022-07-11 RX ADMIN — INSULIN HUMAN 2 UNITS: 100 INJECTION, SOLUTION PARENTERAL at 11:49

## 2022-07-11 RX ADMIN — ACETAMINOPHEN 1000 MG: 500 TABLET ORAL at 11:53

## 2022-07-11 RX ADMIN — DOCUSATE SODIUM 100 MG: 100 CAPSULE, LIQUID FILLED ORAL at 06:27

## 2022-07-11 RX ADMIN — ACETAMINOPHEN 1000 MG: 500 TABLET ORAL at 18:25

## 2022-07-11 RX ADMIN — TIOTROPIUM BROMIDE INHALATION SPRAY 5 MCG: 3.12 SPRAY, METERED RESPIRATORY (INHALATION) at 06:27

## 2022-07-11 ASSESSMENT — ENCOUNTER SYMPTOMS
PALPITATIONS: 0
MYALGIAS: 0
BLURRED VISION: 0
BACK PAIN: 0
DIARRHEA: 0
FALLS: 1
COUGH: 1
ORTHOPNEA: 0
NERVOUS/ANXIOUS: 1
PHOTOPHOBIA: 0
SORE THROAT: 0
HEADACHES: 0
FOCAL WEAKNESS: 0
ABDOMINAL PAIN: 0
SHORTNESS OF BREATH: 1

## 2022-07-11 ASSESSMENT — COGNITIVE AND FUNCTIONAL STATUS - GENERAL
STANDING UP FROM CHAIR USING ARMS: A LITTLE
SUGGESTED CMS G CODE MODIFIER MOBILITY: CL
CLIMB 3 TO 5 STEPS WITH RAILING: A LOT
MOVING FROM LYING ON BACK TO SITTING ON SIDE OF FLAT BED: A LOT
MOVING TO AND FROM BED TO CHAIR: A LOT
MOBILITY SCORE: 14
WALKING IN HOSPITAL ROOM: A LITTLE
TURNING FROM BACK TO SIDE WHILE IN FLAT BAD: A LOT

## 2022-07-11 ASSESSMENT — PAIN DESCRIPTION - PAIN TYPE
TYPE: ACUTE PAIN
TYPE: ACUTE PAIN
TYPE: ACUTE PAIN;SURGICAL PAIN

## 2022-07-11 ASSESSMENT — GAIT ASSESSMENTS
DISTANCE (FEET): 15
ASSISTIVE DEVICE: FRONT WHEEL WALKER
DEVIATION: SHUFFLED GAIT;ANTALGIC;STEP TO
GAIT LEVEL OF ASSIST: CONTACT GUARD ASSIST

## 2022-07-11 NOTE — PROGRESS NOTES
"I was asked by the COPD navigator to review the patient's chart. Pt does have a documented history of COPD but was hospitalized for a fall. Dr. Grant's note unequivocally states \"no exacerbation\" and the patient is at her baseline oxygen flow of 2 lpm. Pulmonary will not engage unless specifically requested via formal consult.     Jc Blanco DO  Staff Pulmonologist and Intensivist  [facility]     Total time with chart review: 5 min   "

## 2022-07-11 NOTE — PROGRESS NOTES
Bedside report received.  Assessment complete.  A&O x 4. Patient calls appropriately.  Patient ambulates with x1 assist and FWW. RLE WBAT.  Patient has 4/10 pain. Pain managed with ice packs.  Denies N&V. Tolerating DM diet.  R hip with dressings CDI..  + void to catalan, + flatus, - BM.  Patient denies SOB.  Review plan with of care with patient. Call light and personal belongings within reach. Hourly rounding in place. All needs met at this time.

## 2022-07-11 NOTE — THERAPY
Physical Therapy   Daily Treatment     Patient Name: Mckenna Sewell  Age:  66 y.o., Sex:  female  Medical Record #: [id number]  Today's Date: 7/11/2022     Precautions  Precautions: Fall Risk;Weight Bearing As Tolerated Right Lower Extremity    Assessment    Pt seen for follow up PT tx. Daughter at bedside and had questions regarding dc recommendations and progress with therapy. Pt demonstrated significant progress with all mobility. Min assist required for bed mob, CGA for transfers, CGA for short gait distances using FWW. Pt able to tolerate standing at sink for extended about of time with intermittent use of UE for support and weight shifting L/R. PT will cont while in acute care setting to address deficits.     Plan    Continue current treatment plan.    DC Equipment Recommendations: Unable to determine at this time  Discharge Recommendations: Recommend post-acute placement for additional physical therapy services prior to discharge home         07/11/22 1547   Vitals   O2 (LPM) 2   O2 Delivery Device Nasal Cannula   Cognition    Level of Consciousness Alert   Comments coopertive, daughter present throughout   Neuro-Muscular Treatments   Neuro-Muscular Treatments Weight Shift Left;Weight Shift Right;Verbal Cuing;Compensatory Strategies   Comments pt able to tolerate ~10 min in standing at sink with intermittent use of UE for support. Pt able to weight shift L/R for balance and to increase WB on surgical limb   Other Treatments   Other Treatments Provided daughter at bedside and therapist answered questoins regarding dc recommendations   Balance   Sitting Balance (Static) Fair   Sitting Balance (Dynamic) Fair   Standing Balance (Static) Fair -   Standing Balance (Dynamic) Poor +   Weight Shift Sitting Fair   Weight Shift Standing Fair   Skilled Intervention Verbal Cuing;Compensatory Strategies   Comments FWW   Gait Analysis   Gait Level Of Assist Contact Guard Assist   Assistive Device Front Wheel Walker   Distance  (Feet) 15   # of Times Distance was Traveled 2   Deviation Shuffled Gait;Antalgic;Step To   Weight Bearing Status WBAT R LE   Skilled Intervention Verbal Cuing;Compensatory Strategies   Comments some encouragement required, use of FWW to off load R LE   Bed Mobility    Supine to Sit Minimal Assist   Sit to Supine Minimal Assist   Scooting Supervised   Skilled Intervention Verbal Cuing;Compensatory Strategies   Comments extra time needed and cues for compensating   Functional Mobility   Sit to Stand Contact Guard Assist   Bed, Chair, Wheelchair Transfer Contact Guard Assist   Toilet Transfers Minimal Assist   Transfer Method Stand Step   Mobility in room with FWW   Skilled Intervention Verbal Cuing;Compensatory Strategies   Short Term Goals    Short Term Goal # 1 Pt will perform bed mobility with supervision to improve independence in 6 visits.   Goal Outcome # 1 Progressing as expected   Short Term Goal # 2 Pt will perform transfers with FWW and supervision to progress mobility in 6 visits.   Goal Outcome # 2 Progressing as expected   Short Term Goal # 3 Pt will ambulate 100ft with FWW and supervision to progress mobility in 6 visits.   Goal Outcome # 3 Progressing as expected   Short Term Goal # 4 Pt will tolerate at least 8 stairs with min A to progress mobility in 6 visits.   Goal Outcome # 4 Goal not met   Anticipated Discharge Equipment and Recommendations   DC Equipment Recommendations Unable to determine at this time   Discharge Recommendations Recommend post-acute placement for additional physical therapy services prior to discharge home

## 2022-07-11 NOTE — PROGRESS NOTES
"Resting comfortably. Pain well controlled. Denies CP/dyspnea/fever. OK to DC catalan from Ortho standpoint. Dispo pending PT/OT/Med re-evals. Recc: all meals OOB, Ambulate with assist (CNA) TID. Clear for disposition (home/SNF/IRF) from Orthopaedic team standpoint.    BP (!) 146/83   Pulse 85   Temp 36.8 °C (98.2 °F) (Temporal)   Resp 16   Ht 1.626 m (5' 4\")   Wt 66.1 kg (145 lb 11.6 oz)   SpO2 96%     Recent Labs     07/08/22  1024 07/09/22  0218 07/11/22  0327   WBC  --  8.0 13.6*   RBC  --  3.74* 3.95*   HEMOGLOBIN 7.2* 9.5* 10.1*   HEMATOCRIT 23.6* 30.1* 31.3*   MCV  --  80.5* 79.2*   MCH  --  25.4* 25.6*   MCHC  --  31.6* 32.3*   RDW  --  43.4 43.4   PLATELETCT  --  158* 255   MPV  --  9.5 9.9       No acute distress  Surgical dressing is clean, dry, and intact. Patient clearly fires tibialis anterior, EHL, and gastrocnemius/soleus. Sensation is intact to light touch throughout superficial peroneal, deep peroneal, tibial, saphenous, and sural nerve distributions. Strong and palpable 2+ dorsalis pedis and posterior tibial pulses with capillary refill less than 2 seconds. No lower leg tenderness or discomfort.    POD# 5 S/P Intramedullary nail right subtrochanteric femur fracture  Plan:  DVT Prophylaxis- TEDS/SCDs, LMWH while in hospital, ASA 81 mg PO BID as outpatient  Weight Bearing Status- WBAT  PT/OT  Antibiotics: perioperative complete  Case Coordination: Follow-Up: needs appointment with Dr. Huynh at [facility] at 10-14 days post-op for re-evaluation, staple removal and radiographs.            "

## 2022-07-11 NOTE — DISCHARGE PLANNING
Received Choice form at 1004  Agency/Facility Name: [facility]   Referral sent per Choice form @ [phone]  Agency/Facility Name: [facility]   Outcome: DPA left Vmail regarding bed availability with request of call back.

## 2022-07-11 NOTE — PROGRESS NOTES
Hospital Medicine Daily Progress Note    Date of Service  7/11/2022    Chief Complaint  Mckenna Sewell is a 66 y.o. female admitted 7/6/2022 with fall    Hospital Course  This is a 66 year-old female with a past medical history significant for nicotine dependence, COPD, 2 L of oxygen ,ethanol abuse, type 2 diabetes mellitus, hypertension, hyperlipidemia, meth abuse presented on 7/6/2022 after she had a ground-level fall.      Imaging showed comminuted displaced angulated right proximal femur fracture likely subtrochanteric with intertrochanteric extension.  Orthopedic surgery was consulted, underwent surgical treatment of right subtrochanteric femur fracture with intramedullary device on 7/6/2022.  Per orthopedic surgery, patient can bear weight as tolerated.     Hospital course complicated with acute blood loss anemia secondary to surgery.  Patient hemoglobin noted to be 6.9, s/p 1 unit of PRBC transfusion.  Plan continue with iron deficiency, status post IV iron transfusion.  Today her hemoglobin is noted to be 10.1.  Patient is medically stable to be discharged to skilled nursing facility,    Interval Problem Update  -Patient was evaluated after surgery, patient having some pain on her hip  -Hemoglobin dropped to 9 from 11, no signs of bleeding, continue monitoring.  -Improving kidney function    7/9:  --No acute events overnight, HR 77-80;  BP is noted to be elevated and has been requiring 3 L of O2    -- Echo showing diastolic dysfunction;  RSVP at 40s ; will provide 20 mg pf IV lasix X 1   --s/p 1 unit of PRBC transfusion    -- H&H at  9.5  Platelet at 158, will monitor; Not actively bleeding   Na at 134  PT/OT has evaluated pending medical antibiotic requested; SNF referral has been placed    7/10::  --No acute events overnight, laying in bed, vital sign has been stable  -- BP elevated at 154/78 , requiring on 2 L of O2   -- na at 134, monitor   --Hemoglobin ar 9.5; platelet at 158; not actively bleeding    PT/OT recs post-acute;  snf and physiatry referral in place  Medically stable to be discharged     7/11:  -- No acute events overnight, patient has been stable, heart rate sats of 95, blood pressure 90 elevated at 146/83, saturating 6% on 2 L of oxygen.(which is her baseline)  ---, Procalcitonin normal, no antibiotics indicated.  Patient does have a microcytic anemia consistent with iron deficiency.  I started with IV iron transfusion.  Monitor hemoglobin, if less than 7, transfuse  -- PT and OT has evaluated the patient , recs  Postacute,at  this time patient is medically stable to be discharged to skilled nursing [facility], awaiting med    Consultants/Specialty  orthopedics    Code Status  Full Code    Disposition  Patient is Medically  for discharge.   Anticipate discharge to to skilled nursing facility.  I have placed the appropriate orders for post-discharge needs.    Review of Systems  Review of Systems   Constitutional: Positive for malaise/fatigue.   HENT: Negative for congestion and sore throat.    Eyes: Negative for blurred vision and photophobia.   Respiratory: Positive for cough and shortness of breath.    Cardiovascular: Negative for chest pain, palpitations and orthopnea.   Gastrointestinal: Negative for abdominal pain and diarrhea.   Genitourinary: Negative for urgency.   Musculoskeletal: Positive for falls and joint pain. Negative for back pain and myalgias.   Neurological: Negative for focal weakness and headaches.   Psychiatric/Behavioral: The patient is nervous/anxious.    All other systems reviewed and are negative.       Physical Exam  Temp:  [36.2 °C (97.2 °F)-36.8 °C (98.2 °F)] 36.8 °C (98.2 °F)  Pulse:  [79-85] 85  Resp:  [16-18] 18  BP: (121-155)/(69-83) 146/83  SpO2:  [93 %-97 %] 96 %    Physical Exam  Vitals and nursing note reviewed.   Constitutional:       Appearance: She is ill-appearing.   HENT:      Head: Normocephalic and atraumatic.   Eyes:      Extraocular Movements: Extraocular  movements intact.   Cardiovascular:      Rate and Rhythm: Normal rate.      Heart sounds: No murmur heard.  Pulmonary:      Effort: No respiratory distress.      Breath sounds: No wheezing.   Abdominal:      General: Bowel sounds are normal. There is no distension.      Palpations: Abdomen is soft.      Tenderness: There is no guarding.   Musculoskeletal:         General: Tenderness present.      Cervical back: Neck supple.      Comments: Decreased ROm in the right leg 2/2 pain    Neurological:      Mental Status: She is alert and oriented to person, place, and time.      Cranial Nerves: No cranial nerve deficit.      Motor: No weakness.   Psychiatric:         Mood and Affect: Mood normal.         Fluids    Intake/Output Summary (Last 24 hours) at 7/11/2022 1502  Last data filed at 7/11/2022 0400  Gross per 24 hour   Intake --   Output 1060 ml   Net -1060 ml       Laboratory  Recent Labs     07/09/22  0218 07/11/22  0327   WBC 8.0 13.6*   RBC 3.74* 3.95*   HEMOGLOBIN 9.5* 10.1*   HEMATOCRIT 30.1* 31.3*   MCV 80.5* 79.2*   MCH 25.4* 25.6*   MCHC 31.6* 32.3*   RDW 43.4 43.4   PLATELETCT 158* 255   MPV 9.5 9.9     Recent Labs     07/09/22  0218 07/11/22  0327   SODIUM 134* 134*   POTASSIUM 4.2 3.9   CHLORIDE 101 98   CO2 24 25   GLUCOSE 185* 167*   BUN 20 20   CREATININE 0.74 0.57   CALCIUM 9.0 9.2                   Imaging  DX-FEMUR-2+ RIGHT   Final Result      Intraoperative image(s) as described.      DX-PORTABLE FLUOROSCOPY < 1 HOUR   Final Result      Portable fluoroscopy utilized for 43 seconds.         INTERPRETING LOCATION: [address] [place] [address], [id number]      DX-FEMUR-2+ RIGHT   Final Result      Comminuted and displaced subtrochanteric RIGHT proximal femur fracture.      DX-PELVIS-1 OR 2 VIEWS   Final Result      1.  Comminuted, displaced and angulated RIGHT proximal femur fracture likely subtrochanteric with intertrochanteric extension   2.  No pelvic fracture.           Assessment/Plan  * Femur fracture  ([facility])  Assessment & Plan  Patient still feeling lightheaded prior to presentation, continue IV fluid, obtain BNP, echocardiogram ordered, pending  --Orthopedic surgery was consulted, patient underwent surgery   -- PT/OT recs post-acute   , DVT prophylaxis  Multimodal pain management   Medically stable to be discharged to skilled nursing home      Thrombocytopenia ([facility])  Assessment & Plan  Resolved at 255    Anemia  Assessment & Plan  Acute blood loss anemia,  S/p PRBC transfusion on 7/8   -- Today hemoglobin at 10.1    Methamphetamine abuse ([facility])  Assessment & Plan  Reportedly used methamphetamine for over 20 years over 10 years ago.  No recent use.      MAXIMILIANO (acute kidney injury) ([facility])  Assessment & Plan  Likely secondary to MAXIMILIANO from prerenal etiology, monitor, avoid nephrotoxin  Today creatinine at 0.57    Type 2 diabetes mellitus with complication ([facility])- (present on admission)  Assessment & Plan  Uncontrolled and A1c more than 8  ISS, hypoglycemia protocol, accu checks ac and hs     Hyperlipidemia  Assessment & Plan  Continue Zocor    COPD (chronic obstructive pulmonary disease) ([facility])- (present on admission)  Assessment & Plan  History of smoking   No exacerbation   continue Spiriva and albuterol as needed  Does use 2 L of o2 at home, which is her abseline   , keep O2 over 90%       VTE prophylaxis:  lovenox     I have performed a physical exam and reviewed and updated ROS and Plan today (7/11/2022). In review of yesterday's note (7/10/2022), there are no changes except as documented above.

## 2022-07-11 NOTE — CARE PLAN
The patient is Stable - Low risk of patient condition declining or worsening    Shift Goals  Clinical Goals: pain control  Patient Goals: bed bath    Progress made toward(s) clinical / shift goals:  Pain being controlled with ice packs, scheduled tylenol, elevation of R leg and rest.  Pt received bed bath with assistance from family member.      Problem: Pain - Standard  Goal: Alleviation of pain or a reduction in pain to the patient’s comfort goal  Outcome: Progressing     Problem: Knowledge Deficit - Standard  Goal: Patient and family/care givers will demonstrate understanding of plan of care, disease process/condition, diagnostic tests and medications  Outcome: Progressing     Problem: Self Care  Goal: Patient will have the ability to perform ADLs independently or with assistance (bathe, groom, dress, toilet and feed)  Outcome: Progressing       Patient is not progressing towards the following goals:

## 2022-07-11 NOTE — DISCHARGE PLANNING
Care Transition Team Assessment    Case Management Discharge Planning    Admission Date: 7/6/2022  GMLOS: 4.3  ALOS: 5    6-Clicks ADL Score: 15  6-Clicks Mobility Score: 10  PT and/or OT Eval ordered: Yes  Post-acute Referrals Ordered: Yes  Post-acute Choice Obtained: Yes  Has referral(s) been sent to post-acute provider:  Yes      Anticipated Discharge Dispo: Discharge Disposition: D/T to SNF with Medicare cert in anticipation of skilled care (03)    DME Needed: No    Action(s) Taken: LSW met with patient at bedside to complete the Psychosocial Assessment and issued SNF choice.  The patient verified the demographic information in the Facesheet. Patient states she lives at home with Emily, her daughter.    The patient stated her daughter is able to provide intermitent assistance at home. The patient states she has Home Oxygen, provided by [facility].    LSW explained MD recommendation for SNF prior to returning home, patient agreed to a blanket referral, she will choose from the list of accepting facilities. LSW faxed the SNF choice form to JUAN COSTA in File # [id number]      Escalations Completed: None    Medically Clear: Yes    Next Steps: SNF. Pending accepting facility and transfer arrangements.    Barriers to Discharge: Pending Placement    Is the patient up for discharge tomorrow:         Information Source  Orientation Level: Oriented X4  Information Given By: Patient  Informant's Name: Mckenna  Who is responsible for making decisions for patient? : Patient    Readmission Evaluation  Is this a readmission?: No    Elopement Risk  Legal Hold: No  Ambulatory or Self Mobile in Wheelchair: No-Not an Elopement Risk  Elopement Risk: Not at Risk for Elopement    Interdisciplinary Discharge Planning  Lives with - Patient's Self Care Capacity: Adult Children  Housing / Facility: 2 Story Apartment / Condo  Prior Services: Home-Independent    Discharge Preparedness  What is your plan after discharge?: Skilled  nursing facility  What are your discharge supports?: Child  Prior Functional Level: Needs Assist with Activities of Daily Living    Functional Assesment  Prior Functional Level: Needs Assist with Activities of Daily Living    Finances  Financial Barriers to Discharge: No  Prescription Coverage: Yes              Advance Directive  Advance Directive?: None  Advance Directive offered?: AD Booklet refused    Domestic Abuse  Have you ever been the victim of abuse or violence?: No    Psychological Assessment  History of Substance Abuse: None  History of Psychiatric Problems: No  Newly Diagnosed Illness: No    Discharge Risks or Barriers  Discharge risks or barriers?: No  Patient risk factors: Complex medical needs    Anticipated Discharge Information  Discharge Disposition: D/T to SNF with Medicare cert in anticipation of skilled care (03)

## 2022-07-12 LAB
ERYTHROCYTE [DISTWIDTH] IN BLOOD BY AUTOMATED COUNT: 45.1 FL (ref 35.9–50)
GLUCOSE BLD STRIP.AUTO-MCNC: 157 MG/DL (ref 65–99)
GLUCOSE BLD STRIP.AUTO-MCNC: 227 MG/DL (ref 65–99)
GLUCOSE BLD STRIP.AUTO-MCNC: 237 MG/DL (ref 65–99)
GLUCOSE BLD STRIP.AUTO-MCNC: 271 MG/DL (ref 65–99)
HCT VFR BLD AUTO: 33.8 % (ref 37–47)
HGB BLD-MCNC: 10.5 G/DL (ref 12–16)
MCH RBC QN AUTO: 25.4 PG (ref 27–33)
MCHC RBC AUTO-ENTMCNC: 31.1 G/DL (ref 33.6–35)
MCV RBC AUTO: 81.6 FL (ref 81.4–97.8)
PLATELET # BLD AUTO: 278 K/UL (ref 164–446)
PMV BLD AUTO: 9.2 FL (ref 9–12.9)
RBC # BLD AUTO: 4.14 M/UL (ref 4.2–5.4)
WBC # BLD AUTO: 9.6 K/UL (ref 4.8–10.8)

## 2022-07-12 PROCEDURE — 85027 COMPLETE CBC AUTOMATED: CPT

## 2022-07-12 PROCEDURE — 700111 HCHG RX REV CODE 636 W/ 250 OVERRIDE (IP): Performed by: HOSPITALIST

## 2022-07-12 PROCEDURE — 700102 HCHG RX REV CODE 250 W/ 637 OVERRIDE(OP): Performed by: HOSPITALIST

## 2022-07-12 PROCEDURE — 770001 HCHG ROOM/CARE - MED/SURG/GYN PRIV*

## 2022-07-12 PROCEDURE — 700102 HCHG RX REV CODE 250 W/ 637 OVERRIDE(OP): Performed by: ORTHOPAEDIC SURGERY

## 2022-07-12 PROCEDURE — 700111 HCHG RX REV CODE 636 W/ 250 OVERRIDE (IP): Performed by: ORTHOPAEDIC SURGERY

## 2022-07-12 PROCEDURE — A9270 NON-COVERED ITEM OR SERVICE: HCPCS | Performed by: STUDENT IN AN ORGANIZED HEALTH CARE EDUCATION/TRAINING PROGRAM

## 2022-07-12 PROCEDURE — A9270 NON-COVERED ITEM OR SERVICE: HCPCS | Performed by: HOSPITALIST

## 2022-07-12 PROCEDURE — 700102 HCHG RX REV CODE 250 W/ 637 OVERRIDE(OP): Performed by: STUDENT IN AN ORGANIZED HEALTH CARE EDUCATION/TRAINING PROGRAM

## 2022-07-12 PROCEDURE — 700105 HCHG RX REV CODE 258: Performed by: HOSPITALIST

## 2022-07-12 PROCEDURE — A9270 NON-COVERED ITEM OR SERVICE: HCPCS | Performed by: ORTHOPAEDIC SURGERY

## 2022-07-12 PROCEDURE — 99231 SBSQ HOSP IP/OBS SF/LOW 25: CPT | Performed by: HOSPITALIST

## 2022-07-12 PROCEDURE — 82962 GLUCOSE BLOOD TEST: CPT

## 2022-07-12 PROCEDURE — 36415 COLL VENOUS BLD VENIPUNCTURE: CPT

## 2022-07-12 PROCEDURE — 97116 GAIT TRAINING THERAPY: CPT

## 2022-07-12 RX ORDER — ENOXAPARIN SODIUM 100 MG/ML
40 INJECTION SUBCUTANEOUS
Qty: 23 EACH | Refills: 0 | Status: SHIPPED
Start: 2022-07-13 | End: 2022-08-05

## 2022-07-12 RX ORDER — POLYETHYLENE GLYCOL 3350 17 G/17G
17 POWDER, FOR SOLUTION ORAL 2 TIMES DAILY PRN
Qty: 15 EACH | Refills: 3 | Status: SHIPPED
Start: 2022-07-12 | End: 2023-11-09

## 2022-07-12 RX ORDER — OXYCODONE HYDROCHLORIDE 5 MG/1
5 TABLET ORAL EVERY 8 HOURS PRN
Qty: 6 TABLET | Refills: 0 | Status: SHIPPED | OUTPATIENT
Start: 2022-07-12 | End: 2022-07-14

## 2022-07-12 RX ORDER — TAMSULOSIN HYDROCHLORIDE 0.4 MG/1
0.4 CAPSULE ORAL
Qty: 30 CAPSULE | Refills: 0 | Status: SHIPPED
Start: 2022-07-13 | End: 2023-11-09

## 2022-07-12 RX ORDER — TAMSULOSIN HYDROCHLORIDE 0.4 MG/1
0.4 CAPSULE ORAL
Status: DISCONTINUED | OUTPATIENT
Start: 2022-07-12 | End: 2022-07-13 | Stop reason: HOSPADM

## 2022-07-12 RX ORDER — PSEUDOEPHEDRINE HCL 30 MG
100 TABLET ORAL 2 TIMES DAILY
Qty: 60 CAPSULE | Status: SHIPPED
Start: 2022-07-12 | End: 2022-12-03

## 2022-07-12 RX ADMIN — NICOTINE TRANSDERMAL SYSTEM 21 MG: 21 PATCH, EXTENDED RELEASE TRANSDERMAL at 05:10

## 2022-07-12 RX ADMIN — GABAPENTIN 300 MG: 300 CAPSULE ORAL at 05:09

## 2022-07-12 RX ADMIN — BACLOFEN 10 MG: 10 TABLET ORAL at 20:11

## 2022-07-12 RX ADMIN — DOCUSATE SODIUM 100 MG: 100 CAPSULE, LIQUID FILLED ORAL at 05:09

## 2022-07-12 RX ADMIN — GABAPENTIN 300 MG: 300 CAPSULE ORAL at 11:12

## 2022-07-12 RX ADMIN — DOCUSATE SODIUM 100 MG: 100 CAPSULE, LIQUID FILLED ORAL at 16:57

## 2022-07-12 RX ADMIN — BACLOFEN 10 MG: 10 TABLET ORAL at 11:12

## 2022-07-12 RX ADMIN — INSULIN HUMAN 1 UNITS: 100 INJECTION, SOLUTION PARENTERAL at 06:03

## 2022-07-12 RX ADMIN — BACLOFEN 10 MG: 10 TABLET ORAL at 02:48

## 2022-07-12 RX ADMIN — SENNOSIDES AND DOCUSATE SODIUM 1 TABLET: 50; 8.6 TABLET ORAL at 20:11

## 2022-07-12 RX ADMIN — ASPIRIN 81 MG: 81 TABLET, COATED ORAL at 05:09

## 2022-07-12 RX ADMIN — LOSARTAN POTASSIUM 50 MG: 50 TABLET, FILM COATED ORAL at 05:10

## 2022-07-12 RX ADMIN — HYDROMORPHONE HYDROCHLORIDE 0.5 MG: 1 INJECTION, SOLUTION INTRAMUSCULAR; INTRAVENOUS; SUBCUTANEOUS at 19:45

## 2022-07-12 RX ADMIN — TIOTROPIUM BROMIDE INHALATION SPRAY 5 MCG: 3.12 SPRAY, METERED RESPIRATORY (INHALATION) at 06:00

## 2022-07-12 RX ADMIN — GABAPENTIN 300 MG: 300 CAPSULE ORAL at 16:57

## 2022-07-12 RX ADMIN — ENOXAPARIN SODIUM 40 MG: 40 INJECTION SUBCUTANEOUS at 11:12

## 2022-07-12 RX ADMIN — SODIUM CHLORIDE 250 MG: 9 INJECTION, SOLUTION INTRAVENOUS at 18:33

## 2022-07-12 RX ADMIN — INSULIN HUMAN 3 UNITS: 100 INJECTION, SOLUTION PARENTERAL at 20:13

## 2022-07-12 RX ADMIN — OXYCODONE 5 MG: 5 TABLET ORAL at 18:44

## 2022-07-12 RX ADMIN — OXYCODONE 5 MG: 5 TABLET ORAL at 00:49

## 2022-07-12 RX ADMIN — INSULIN HUMAN 2 UNITS: 100 INJECTION, SOLUTION PARENTERAL at 16:56

## 2022-07-12 RX ADMIN — OXYCODONE 5 MG: 5 TABLET ORAL at 13:04

## 2022-07-12 RX ADMIN — TAMSULOSIN HYDROCHLORIDE 0.4 MG: 0.4 CAPSULE ORAL at 13:03

## 2022-07-12 RX ADMIN — INSULIN HUMAN 2 UNITS: 100 INJECTION, SOLUTION PARENTERAL at 11:17

## 2022-07-12 ASSESSMENT — PAIN DESCRIPTION - PAIN TYPE
TYPE: SURGICAL PAIN

## 2022-07-12 ASSESSMENT — LIFESTYLE VARIABLES
CONSUMPTION TOTAL: POSITIVE
HOW MANY TIMES IN THE PAST YEAR HAVE YOU HAD 5 OR MORE DRINKS IN A DAY: 10
HAVE PEOPLE ANNOYED YOU BY CRITICIZING YOUR DRINKING: NO
TOTAL SCORE: 0
ON A TYPICAL DAY WHEN YOU DRINK ALCOHOL HOW MANY DRINKS DO YOU HAVE: 1
TOTAL SCORE: 0
DOES PATIENT WANT TO STOP DRINKING: NO
AVERAGE NUMBER OF DAYS PER WEEK YOU HAVE A DRINK CONTAINING ALCOHOL: 3
TOTAL SCORE: 0
HAVE YOU EVER FELT YOU SHOULD CUT DOWN ON YOUR DRINKING: NO
EVER HAD A DRINK FIRST THING IN THE MORNING TO STEADY YOUR NERVES TO GET RID OF A HANGOVER: NO
ALCOHOL_USE: YES
EVER FELT BAD OR GUILTY ABOUT YOUR DRINKING: NO

## 2022-07-12 ASSESSMENT — COGNITIVE AND FUNCTIONAL STATUS - GENERAL
MOVING TO AND FROM BED TO CHAIR: A LOT
STANDING UP FROM CHAIR USING ARMS: A LITTLE
MOBILITY SCORE: 14
TURNING FROM BACK TO SIDE WHILE IN FLAT BAD: A LOT
MOVING FROM LYING ON BACK TO SITTING ON SIDE OF FLAT BED: A LOT
CLIMB 3 TO 5 STEPS WITH RAILING: A LOT
WALKING IN HOSPITAL ROOM: A LITTLE
SUGGESTED CMS G CODE MODIFIER MOBILITY: CL

## 2022-07-12 ASSESSMENT — GAIT ASSESSMENTS
GAIT LEVEL OF ASSIST: CONTACT GUARD ASSIST
DISTANCE (FEET): 40
ASSISTIVE DEVICE: FRONT WHEEL WALKER
DEVIATION: SHUFFLED GAIT;BRADYKINETIC;ANTALGIC;STEP TO

## 2022-07-12 ASSESSMENT — PATIENT HEALTH QUESTIONNAIRE - PHQ9
2. FEELING DOWN, DEPRESSED, IRRITABLE, OR HOPELESS: NOT AT ALL
SUM OF ALL RESPONSES TO PHQ9 QUESTIONS 1 AND 2: 0
1. LITTLE INTEREST OR PLEASURE IN DOING THINGS: NOT AT ALL

## 2022-07-12 NOTE — CARE PLAN
The patient is Stable - Low risk of patient condition declining or worsening    Shift Goals  Clinical Goals: Pain control and safety  Patient Goals: Pain control  Family Goals: N/A    Progress made toward(s) clinical / shift goals:Plan of care discussed.Educated on fall prevention,call light within reach.encouraged to call for assistance.Verbalized understanding.Pain managed per MAR.   Problem: Pain - Standard  Goal: Alleviation of pain or a reduction in pain to the patient’s comfort goal  Outcome: Progressing     Problem: Fall Risk  Goal: Patient will remain free from falls  Outcome: Progressing     Problem: Knowledge Deficit - Standard  Goal: Patient and family/care givers will demonstrate understanding of plan of care, disease process/condition, diagnostic tests and medications  Outcome: Progressing     Problem: Skin Integrity  Goal: Skin integrity is maintained or improved  Outcome: Progressing       Patient is not progressing towards the following goals:

## 2022-07-12 NOTE — CARE PLAN
The patient is Stable - Low risk of patient condition declining or worsening    Shift Goals  Clinical Goals: pain control, safety  Patient Goals: pain control  Family Goals: rest    Progress made toward(s) clinical / shift goals:    Problem: Pain - Standard  Goal: Alleviation of pain or a reduction in pain to the patient’s comfort goal  Outcome: Progressing  Pt verbalizes understanding of 0 to 10 pain scale and calls nursing staff appropriately to address pain needs.     Problem: Knowledge Deficit - Standard  Goal: Patient and family/care givers will demonstrate understanding of plan of care, disease process/condition, diagnostic tests and medications  Outcome: Progressing  Pt verbalizes understanding of POC. Currently waiting for authorization / bed availability at [facility] (SNF).

## 2022-07-12 NOTE — DISCHARGE PLANNING
Agency/Facility Name: [facility]  Outcome: DPA left  requesting update on Pt's acceptance. Expecting call back.     1117-  Agency/Facility Name: [facility]  Spoke To: Jeanine  Outcome: DPA received call from Jeanine, inquiring if we want to begin auth. Per LSW, DPA asking to begin auth. Jeanine to call DPA if any updated notes are needed.     LSW notified

## 2022-07-12 NOTE — DISCHARGE SUMMARY
Discharge Summary    CHIEF COMPLAINT ON ADMISSION  Chief Complaint   Patient presents with   • Leg Pain     Pt was walking to her car today when felt dizzy and fell. Pt has deformity noted to right thigh. PMS intact. Pt received 20mg Ketamine from EMS as well ads 250ml NS bolus for /60. Pt wears 2L NC at home, on same here sats 94%. Denies any thinners, but does takes ASA daily. Axox4 at this time.        Reason for Admission  EMS     Admission Date  7/6/2022    CODE STATUS  Full Code    HPI & HOSPITAL COURSE  This is a 66 year-old female with a past medical history significant for nicotine dependence, COPD, 2 L of oxygen ,ethanol abuse, type 2 diabetes mellitus, hypertension, hyperlipidemia, meth abuse presented on 7/6/2022 after she had a ground-level fall.      Imaging showed comminuted displaced angulated right proximal femur fracture likely subtrochanteric with intertrochanteric extension.  Orthopedic surgery was consulted, underwent surgical treatment of right subtrochanteric femur fracture with intramedullary device on 7/6/2022.  Per orthopedic surgery, patient can bear weight as tolerated.     Hospital course complicated with acute blood loss anemia secondary to surgery.  Patient hemoglobin noted to be 6.9, s/p 1 unit of PRBC transfusion.  Plan continue with iron deficiency, status post IV iron transfusion.  Today her hemoglobin is noted to be 10.5.  Patient is medically stable to be discharged to skilled nursing facility,      Therefore, she is discharged in fair and stable condition to skilled nursing facility.    The patient met 2-midnight criteria for an inpatient stay at the time of discharge.    Discharge Date  7/12/2022    FOLLOW UP ITEMS POST DISCHARGE  Willis Gomez M.D.  Please follow up with pcp as an op     DISCHARGE DIAGNOSES  Principal Problem:    Femur fracture (HCC) POA: Unknown  Active Problems:    COPD (chronic obstructive pulmonary disease) (HCC) (Chronic) POA: Yes     Hyperlipidemia POA: Unknown    Type 2 diabetes mellitus with complication (HCC) (Chronic) POA: Yes    MAXIMILIANO (acute kidney injury) (HCC) POA: Unknown    Other fracture of right femur, initial encounter for closed fracture (HCC) POA: Yes    Methamphetamine abuse (HCC) POA: Unknown    Anemia POA: Unknown    Thrombocytopenia (HCC) POA: Unknown  Resolved Problems:    * No resolved hospital problems. *      FOLLOW UP  No future appointments.  No follow-up provider specified.    MEDICATIONS ON DISCHARGE     Medication List      START taking these medications      Instructions   docusate sodium 100 MG Caps   Take 100 mg by mouth 2 times a day.  Dose: 100 mg     enoxaparin 40 MG/0.4ML Sosy inj  Start taking on: July 13, 2022  Commonly known as: Lovenox   Inject 40 mg under the skin every day for 23 days.  Dose: 40 mg     oxyCODONE immediate-release 5 MG Tabs  Commonly known as: ROXICODONE   Take 1 Tablet by mouth every 8 hours as needed for Severe Pain for up to 2 days.  Dose: 5 mg     polyethylene glycol/lytes 17 g Pack  Commonly known as: MIRALAX   Take 1 Packet by mouth 2 times a day as needed (if sennosides and/or docusate ineffective or not ordered).  Dose: 17 g     tamsulosin 0.4 MG capsule  Start taking on: July 13, 2022  Commonly known as: FLOMAX   Take 1 Capsule by mouth 1/2 hour after breakfast.  Dose: 0.4 mg        CHANGE how you take these medications      Instructions   simvastatin 20 MG Tabs  What changed: when to take this  Commonly known as: ZOCOR   TAKE ONE TABLET BY MOUTH ONCE DAILY IN THE EVENING        CONTINUE taking these medications      Instructions   albuterol 108 (90 Base) MCG/ACT Aers inhalation aerosol   Inhale 2 Puffs by mouth every 6 hours as needed for Shortness of Breath.  Dose: 2 Puff     aspirin EC 81 MG Tbec  Commonly known as: ECOTRIN   Take 81 mg by mouth every day.  Dose: 81 mg     carvedilol 25 MG Tabs  Commonly known as: COREG   TAKE ONE TABLET BY MOUTH TWICE DAILY WITH MEALS      COMBIVENT INH   Inhale  by mouth.     cyclobenzaprine 10 mg Tabs  Commonly known as: Flexeril   Take 10 mg by mouth at bedtime.  Dose: 10 mg     famotidine 20 MG Tabs  Commonly known as: PEPCID   Take 20 mg by mouth in the morning and 20 mg in the evening.  Dose: 20 mg     gabapentin 300 MG Caps  Commonly known as: NEURONTIN   Take 600-900 mg by mouth at bedtime.  Dose: 600-900 mg     Jardiance 25 MG Tabs  Generic drug: Empagliflozin   Take 1 Tablet by mouth every day.  Dose: 1 Tablet     losartan 50 MG Tabs  Commonly known as: COZAAR   Take 1 Tab by mouth 2 Times a Day.  Dose: 50 mg     nicotine 14 MG/24HR Pt24  Commonly known as: NICODERM   Place 1 Patch on the skin every 24 hours.  Dose: 1 Patch     NovoLOG FlexPen 100 UNIT/ML injection PEN  Generic drug: insulin aspart   Inject  under the skin 2 times a day. PER SLIDING SCALE:  2 units for level 150+, then additional 2 units for every 50 above.     Spiriva Respimat 2.5 mcg/Act Aers  Generic drug: tiotropium   Inhale 5 mcg every day.  Dose: 5 mcg     Tradjenta 5 MG Tabs tablet  Generic drug: linagliptin   Take 5 mg by mouth every day.  Dose: 5 mg        STOP taking these medications    glipiZIDE 10 MG Tabs  Commonly known as: GLUCOTROL            Allergies  Allergies   Allergen Reactions   • Advair Diskus      Rash/hives   • Codeine    • Lisinopril    • Sulfa Drugs        DIET  Orders Placed This Encounter   Procedures   • Diet Order Diet: Consistent CHO (Diabetic)     Standing Status:   Standing     Number of Occurrences:   1     Order Specific Question:   Diet:     Answer:   Consistent CHO (Diabetic) [4]       ACTIVITY  As tolerated.  Weight bearing as tolerated    CONSULTATIONS  ortho    PROCEDURES  Surgical treatment of right subtrochanteric femur fracture with intramedullary device       LABORATORY  Lab Results   Component Value Date    SODIUM 134 (L) 07/11/2022    POTASSIUM 3.9 07/11/2022    CHLORIDE 98 07/11/2022    CO2 25 07/11/2022    GLUCOSE 167 (H)  07/11/2022    BUN 20 07/11/2022    CREATININE 0.57 07/11/2022    CREATININE 0.9 10/30/2008        Lab Results   Component Value Date    WBC 9.6 07/12/2022    HEMOGLOBIN 10.5 (L) 07/12/2022    HEMATOCRIT 33.8 (L) 07/12/2022    PLATELETCT 278 07/12/2022        Total time of the discharge process exceeds 35 minutes.

## 2022-07-12 NOTE — CARE PLAN
The patient is Stable - Low risk of patient condition declining or worsening    Shift Goals  Clinical Goals: pain control  Patient Goals: pain control  Family Goals: N/A    Progress made toward(s) clinical / shift goals:  pain controlled with po oxycodone, tylenol, and baclofen    Patient is not progressing towards the following goals:      Problem: Pain - Standard  Goal: Alleviation of pain or a reduction in pain to the patient’s comfort goal  Outcome: Progressing     Problem: Fall Risk  Goal: Patient will remain free from falls  Outcome: Progressing     Problem: Knowledge Deficit - Standard  Goal: Patient and family/care givers will demonstrate understanding of plan of care, disease process/condition, diagnostic tests and medications  Outcome: Progressing     Problem: Skin Integrity  Goal: Skin integrity is maintained or improved  Outcome: Progressing

## 2022-07-12 NOTE — PROGRESS NOTES
"Resting comfortably. Pain well controlled. Denies CP/dyspnea/fever. OK to DC [place] from Ortho standpoint. Dispo pending PT/OT/Med re-evals. Recc: all meals OOB, Ambulate with assist (CNA) TID. Clear for disposition (home/SNF/IRF) from Orthopaedic team standpoint.    /67   Pulse 75   Temp 36.7 °C (98 °F) (Temporal)   Resp 15   Ht 1.626 m (5' 4\")   Wt 66.1 kg (145 lb 11.6 oz)   SpO2 95%     Recent Labs     07/11/22  0327 07/12/22  0749   WBC 13.6* 9.6   RBC 3.95* 4.14*   HEMOGLOBIN 10.1* 10.5*   HEMATOCRIT 31.3* 33.8*   MCV 79.2* 81.6   MCH 25.6* 25.4*   MCHC 32.3* 31.1*   RDW 43.4 45.1   PLATELETCT 255 278   MPV 9.9 9.2       No acute distress  Surgical dressing is clean, dry, and intact. Patient clearly fires tibialis anterior, EHL, and gastrocnemius/soleus. Sensation is intact to light touch throughout superficial peroneal, deep peroneal, tibial, saphenous, and sural nerve distributions. Strong and palpable 2+ dorsalis pedis and posterior tibial pulses with capillary refill less than 2 seconds. No lower leg tenderness or discomfort.    POD# 6 S/P Intramedullary nail right subtrochanteric femur fracture  Plan:  DVT Prophylaxis- TEDS/SCDs, LMWH while in hospital, ASA 81 mg PO BID as outpatient  Weight Bearing Status- WBAT  PT/OT  Antibiotics: perioperative complete  Case Coordination: Follow-Up: needs appointment with Dr. Huynh at [facility] at 10-14 days post-op for re-evaluation, staple removal and radiographs.            "

## 2022-07-12 NOTE — THERAPY
Physical Therapy   Daily Treatment     Patient Name: Mckenna Sewell  Age:  66 y.o., Sex:  female  Medical Record #: [id number]  Today's Date: 7/12/2022     Precautions  Precautions: Fall Risk;Weight Bearing As Tolerated Right Lower Extremity    Assessment    Pt seen for follow up PT tx. Pt is continuing to progress with mobility. With cues, pt able to transfer from supine>sitting with SPV. She was able to ambulate 40ft with FWW and CGA. No overt LOB noted but pt fatigued quickly. PT will cont while in acute care setting.     Plan    Continue current treatment plan.    DC Equipment Recommendations: Unable to determine at this time  Discharge Recommendations: Recommend post-acute placement for additional physical therapy services prior to discharge home         07/12/22 1137   Vitals   O2 (LPM) 2   O2 Delivery Device Nasal Cannula   Cognition    Cognition / Consciousness X   Level of Consciousness Alert   Attention Impaired   Comments cooperative   Balance   Sitting Balance (Static) Fair +   Sitting Balance (Dynamic) Fair   Standing Balance (Static) Fair   Standing Balance (Dynamic) Fair -   Weight Shift Sitting Fair   Weight Shift Standing Fair   Skilled Intervention Verbal Cuing;Compensatory Strategies   Comments FWW   Gait Analysis   Gait Level Of Assist Contact Guard Assist   Assistive Device Front Wheel Walker   Distance (Feet) 40   # of Times Distance was Traveled 1   Deviation Shuffled Gait;Bradykinetic;Antalgic;Step To   Weight Bearing Status WBAT R LE   Skilled Intervention Verbal Cuing;Compensatory Strategies   Comments no LOB, fatigued quickly   Bed Mobility    Supine to Sit Supervised   Scooting Supervised   Skilled Intervention Verbal Cuing;Compensatory Strategies   Comments cues for use of UE to assist R LE out of bed   Functional Mobility   Sit to Stand Contact Guard Assist   Bed, Chair, Wheelchair Transfer Contact Guard Assist   Transfer Method Stand Step   Mobility in room and hallway with FWW   Skilled  Intervention Verbal Cuing;Compensatory Strategies   Short Term Goals    Short Term Goal # 1 Pt will perform bed mobility with supervision to improve independence in 6 visits.   Goal Outcome # 1 Progressing as expected   Short Term Goal # 2 Pt will perform transfers with FWW and supervision to progress mobility in 6 visits.   Goal Outcome # 2 Progressing as expected   Short Term Goal # 3 Pt will ambulate 100ft with FWW and supervision to progress mobility in 6 visits.   Goal Outcome # 3 Progressing as expected   Short Term Goal # 4 Pt will tolerate at least 8 stairs with min A to progress mobility in 6 visits.   Goal Outcome # 4 Goal not met   Anticipated Discharge Equipment and Recommendations   DC Equipment Recommendations Unable to determine at this time   Discharge Recommendations Recommend post-acute placement for additional physical therapy services prior to discharge home

## 2022-07-12 NOTE — DISCHARGE PLANNING
Case Management Discharge Planning    Admission Date: 7/6/2022  GMLOS: 4.3  ALOS: 6    6-Clicks ADL Score: 15  6-Clicks Mobility Score: 14  PT and/or OT Eval ordered: Yes  Post-acute Referrals Ordered: Yes  Post-acute Choice Obtained: Yes  Has referral(s) been sent to post-acute provider:  Yes      Anticipated Discharge Dispo: Discharge Disposition: D/T to SNF with Medicare cert in anticipation of skilled care (03)    DME Needed: No    Action(s) Taken: Pt has been accepted by [facility] and is pending insurance authorization. If authorization is approved and a bed is available, pt may leave today.     Escalations Completed: None    Medically Clear: Yes    Next Steps: Follow up with [facility] re: authorization and bed availability.     Barriers to Discharge: Pending Placement    Is the patient up for discharge tomorrow: Yes    Is transport arranged for discharge disposition: No

## 2022-07-13 ENCOUNTER — PATIENT OUTREACH (OUTPATIENT)
Dept: SCHEDULING | Facility: IMAGING CENTER | Age: 67
End: 2022-07-13
Payer: MEDICARE

## 2022-07-13 VITALS
BODY MASS INDEX: 24.88 KG/M2 | TEMPERATURE: 97.6 F | WEIGHT: 145.72 LBS | SYSTOLIC BLOOD PRESSURE: 152 MMHG | HEIGHT: 64 IN | DIASTOLIC BLOOD PRESSURE: 79 MMHG | OXYGEN SATURATION: 96 % | HEART RATE: 84 BPM | RESPIRATION RATE: 16 BRPM

## 2022-07-13 LAB
GLUCOSE BLD STRIP.AUTO-MCNC: 209 MG/DL (ref 65–99)
GLUCOSE BLD STRIP.AUTO-MCNC: 273 MG/DL (ref 65–99)
GLUCOSE BLD STRIP.AUTO-MCNC: 293 MG/DL (ref 65–99)

## 2022-07-13 PROCEDURE — 700102 HCHG RX REV CODE 250 W/ 637 OVERRIDE(OP): Performed by: STUDENT IN AN ORGANIZED HEALTH CARE EDUCATION/TRAINING PROGRAM

## 2022-07-13 PROCEDURE — A9270 NON-COVERED ITEM OR SERVICE: HCPCS | Performed by: ORTHOPAEDIC SURGERY

## 2022-07-13 PROCEDURE — 82962 GLUCOSE BLOOD TEST: CPT

## 2022-07-13 PROCEDURE — 700111 HCHG RX REV CODE 636 W/ 250 OVERRIDE (IP): Performed by: ORTHOPAEDIC SURGERY

## 2022-07-13 PROCEDURE — A9270 NON-COVERED ITEM OR SERVICE: HCPCS | Performed by: STUDENT IN AN ORGANIZED HEALTH CARE EDUCATION/TRAINING PROGRAM

## 2022-07-13 PROCEDURE — 99239 HOSP IP/OBS DSCHRG MGMT >30: CPT | Performed by: HOSPITALIST

## 2022-07-13 PROCEDURE — 700102 HCHG RX REV CODE 250 W/ 637 OVERRIDE(OP): Performed by: HOSPITALIST

## 2022-07-13 PROCEDURE — 97535 SELF CARE MNGMENT TRAINING: CPT

## 2022-07-13 PROCEDURE — 700102 HCHG RX REV CODE 250 W/ 637 OVERRIDE(OP): Performed by: ORTHOPAEDIC SURGERY

## 2022-07-13 PROCEDURE — A9270 NON-COVERED ITEM OR SERVICE: HCPCS | Performed by: HOSPITALIST

## 2022-07-13 RX ORDER — LOSARTAN POTASSIUM 50 MG/1
50 TABLET ORAL ONCE
Status: COMPLETED | OUTPATIENT
Start: 2022-07-13 | End: 2022-07-13

## 2022-07-13 RX ADMIN — INSULIN HUMAN 3 UNITS: 100 INJECTION, SOLUTION PARENTERAL at 11:22

## 2022-07-13 RX ADMIN — BACLOFEN 10 MG: 10 TABLET ORAL at 11:21

## 2022-07-13 RX ADMIN — OXYCODONE HYDROCHLORIDE 10 MG: 10 TABLET ORAL at 04:05

## 2022-07-13 RX ADMIN — ENOXAPARIN SODIUM 40 MG: 40 INJECTION SUBCUTANEOUS at 11:22

## 2022-07-13 RX ADMIN — LOSARTAN POTASSIUM 50 MG: 50 TABLET, FILM COATED ORAL at 07:38

## 2022-07-13 RX ADMIN — OXYCODONE HYDROCHLORIDE 10 MG: 10 TABLET ORAL at 00:14

## 2022-07-13 RX ADMIN — CARVEDILOL 25 MG: 6.25 TABLET, FILM COATED ORAL at 07:38

## 2022-07-13 RX ADMIN — INSULIN HUMAN 2 UNITS: 100 INJECTION, SOLUTION PARENTERAL at 16:39

## 2022-07-13 RX ADMIN — GABAPENTIN 300 MG: 300 CAPSULE ORAL at 05:30

## 2022-07-13 RX ADMIN — ASPIRIN 81 MG: 81 TABLET, COATED ORAL at 05:30

## 2022-07-13 RX ADMIN — DOCUSATE SODIUM 100 MG: 100 CAPSULE, LIQUID FILLED ORAL at 16:43

## 2022-07-13 RX ADMIN — TAMSULOSIN HYDROCHLORIDE 0.4 MG: 0.4 CAPSULE ORAL at 07:42

## 2022-07-13 RX ADMIN — GABAPENTIN 300 MG: 300 CAPSULE ORAL at 11:09

## 2022-07-13 RX ADMIN — DOCUSATE SODIUM 100 MG: 100 CAPSULE, LIQUID FILLED ORAL at 05:30

## 2022-07-13 RX ADMIN — CARVEDILOL 25 MG: 6.25 TABLET, FILM COATED ORAL at 16:43

## 2022-07-13 RX ADMIN — INSULIN HUMAN 3 UNITS: 100 INJECTION, SOLUTION PARENTERAL at 07:39

## 2022-07-13 RX ADMIN — LOSARTAN POTASSIUM 50 MG: 50 TABLET, FILM COATED ORAL at 05:30

## 2022-07-13 RX ADMIN — TIOTROPIUM BROMIDE INHALATION SPRAY 5 MCG: 3.12 SPRAY, METERED RESPIRATORY (INHALATION) at 05:39

## 2022-07-13 RX ADMIN — NICOTINE TRANSDERMAL SYSTEM 21 MG: 21 PATCH, EXTENDED RELEASE TRANSDERMAL at 05:29

## 2022-07-13 RX ADMIN — GABAPENTIN 300 MG: 300 CAPSULE ORAL at 16:43

## 2022-07-13 ASSESSMENT — COGNITIVE AND FUNCTIONAL STATUS - GENERAL
DAILY ACTIVITIY SCORE: 18
HELP NEEDED FOR BATHING: A LITTLE
TOILETING: A LOT
SUGGESTED CMS G CODE MODIFIER DAILY ACTIVITY: CK
PERSONAL GROOMING: A LITTLE
DRESSING REGULAR LOWER BODY CLOTHING: A LITTLE
DRESSING REGULAR UPPER BODY CLOTHING: A LITTLE

## 2022-07-13 ASSESSMENT — PAIN DESCRIPTION - PAIN TYPE: TYPE: SURGICAL PAIN

## 2022-07-13 NOTE — THERAPY
Occupational Therapy  Daily Treatment     Patient Name: Mckenna Sewell  Age:  66 y.o., Sex:  female  Medical Record #: [id number]  Today's Date: 7/13/2022     Precautions  Precautions: Fall Risk, Weight Bearing As Tolerated Right Lower Extremity    Assessment    Pt seen for OT tx in which the following were completed: don/doff socks, don/doff gown, standing toothbrushing, and toileting. Pt limited in ADL performance by decreased activity tolerance, pain, balance, and weakness. Pt able to don/doff gown w/ supv and perform standing toothbrushing w/ CGA. LB dressing and toileting both required min A. Pt demonstrating improvements in fxn performance from evaluation and will continue to benefit from skilled OT while admitted to the hospital.      Plan    Continue current treatment plan.    DC Equipment Recommendations: Unable to determine at this time  Discharge Recommendations: Recommend post-acute placement for additional occupational therapy services prior to discharge home    Subjective    Pleasant and cooperative     Objective       07/13/22 1414   Cognition    Cognition / Consciousness X   Level of Consciousness Alert   Safety Awareness Impaired   Comments Cooperative   Balance   Sitting Balance (Static) Fair +   Sitting Balance (Dynamic) Fair   Standing Balance (Static) Fair   Standing Balance (Dynamic) Fair -   Weight Shift Sitting Fair   Weight Shift Standing Fair   Skilled Intervention Verbal Cuing;Compensatory Strategies   Comments w/ FWW   Bed Mobility    Supine to Sit Minimal Assist   Sit to Supine Minimal Assist   Scooting Supervised   Skilled Intervention Verbal Cuing;Compensatory Strategies;Facilitation   Comments for assist w/ R LE; cues to use L LE to assist   Activities of Daily Living   Grooming Contact Guard Assist  (brushed teeth standing at sink)   Upper Body Dressing Supervision  (don/doff gown)   Lower Body Dressing Minimal Assist  (don/doff socks; min A for safety w/ reaching feet; educated  regarding sock aid and reacher)   Toileting Minimal Assist  (incontinent of urine; facilitated txf to toilet and assist w/ pulling underwear down)   Skilled Intervention Verbal Cuing;Tactile Cuing;Sequencing;Facilitation;Compensatory Strategies   Functional Mobility   Sit to Stand Contact Guard Assist   Bed, Chair, Wheelchair Transfer Contact Guard Assist   Toilet Transfers Contact Guard Assist   Transfer Method Stand Step   Mobility EOB>Bathroom>sink   Skilled Intervention Verbal Cuing;Compensatory Strategies;Facilitation   Comments w/ FWW   Activity Tolerance   Sitting Edge of Bed <10 min   Standing <10 min   Comments w/ FWW   Patient / Family Goals   Patient / Family Goal #1 to go home   Goal #1 Outcome Progressing as expected   Short Term Goals   Short Term Goal # 1 pt will demo toilet txf with Davina   Goal Outcome # 1 Goal met, new goal added   Short Term Goal # 1 B  Pt will perform toileting w/ supv   Short Term Goal # 2 pt will dress LB with Davina and AE prn   Goal Outcome # 2 Goal met, new goal added   Short Term Goal # 2 B  Pt will dress LB w/ supv w/ AE PRN   Short Term Goal # 3 pt will demo toileting with supv   Goal Outcome # 3 Progressing as expected   Education Group   Education Provided Role of Occupational Therapist;Activities of Daily Living;Adaptive Equipment   Role of Occupational Therapist Patient Response Patient;Acceptance;Explanation;Verbal Demonstration   ADL Patient Response Patient;Acceptance;Explanation;Verbal Demonstration;Demonstration;Action Demonstration   Adaptive Equipment Patient Response Patient;Acceptance;Explanation;Demonstration;Verbal Demonstration;Action Demonstration

## 2022-07-13 NOTE — CARE PLAN
The patient is Stable - Low risk of patient condition declining or worsening    Shift Goals  Clinical Goals: monitor surgical site, pain management, safety  Patient Goals: comfort, d/c  Family Goals: no    Progress made toward(s) clinical / shift goals:      Problem: Pain - Standard  Goal: Alleviation of pain or a reduction in pain to the patient’s comfort goal  Outcome: Progressing  Pt verbalizes understanding of 0 to 10 pain scale and calls nursing staff appropriately to address pain needs.     Problem: Knowledge Deficit - Standard  Goal: Patient and family/care givers will demonstrate understanding of plan of care, disease process/condition, diagnostic tests and medications  Outcome: Progressing  Pt verbalizes understanding of POC. Currently waiting for authorization / bed availability at [facility] (SNF). Hoping for d/c today.

## 2022-07-13 NOTE — DISCHARGE PLANNING
Agency/Facility Name: [facility]  Spoke To: Jeanine  Outcome: DPA notified auth process complete, SNF can take Pt today. SNF will coordinate transport for 18:00.     RN DAVID notified

## 2022-07-13 NOTE — DISCHARGE PLANNING
Case Management Discharge Planning    Admission Date: 7/6/2022  GMLOS: 4.3  ALOS: 7    6-Clicks ADL Score: 15  6-Clicks Mobility Score: 14  PT and/or OT Eval ordered: Yes  Post-acute Referrals Ordered: No  Post-acute Choice Obtained: No  Has referral(s) been sent to post-acute provider:  No      Anticipated Discharge Dispo: Discharge Disposition: Discharged to home/self care (01)    DME Needed: No    Action(s) Taken: Met with patient at bedside, discussed plan for snf skilled when auth received. [facility] preferred choice is [facility], spoke to Jeanine at [facility], insurance auth still pending. SNF can take patient today if auth received before 12 noon today, if after 12 noon patient would go tomorrow. Patient updated and voice message left for daughter Marilia.     Update 12.40 pm: Updated daughter Marilia via phone, probable plan for discharge to [facility] tomorrow pending insurance auth.     Update 2pm: Call from [facility], able to accept patient today with transport  at 6pm, updated patient and her daughter Marilia via phone.     Escalations Completed: None    Medically Clear: Yes    Next Steps: SNF for insurance auth.     Barriers to Discharge: Pending Insurance Authorization    Is the patient up for discharge tomorrow: Yes    Is transport arranged for discharge disposition: No

## 2022-07-13 NOTE — DISCHARGE INSTRUCTIONS
Discharge Instructions    Discharged to other by medical transportation with medical transportation. Discharged via wheelchair, hospital escort: Yes.  Special equipment needed: Not Applicable    Be sure to schedule a follow-up appointment with your primary care doctor or any specialists as instructed.   Follow up with Dr. Huynh within two weeks  Take medications as prescribed  For any questions or concerns please contact surgeon or PCP    Discharge Plan:        I understand that a diet low in cholesterol, fat, and sodium is recommended for good health. Unless I have been given specific instructions below for another diet, I accept this instruction as my diet prescription.   Other diet: Diabetic    Special Instructions: Discharge instructions for the Orthopedic Patient    Follow up with Primary Care Physician within 2 weeks of discharge to home, regarding:  Review of medications and diagnostic testing.  Surveillance for medical complications.  Workup and treatment of osteoporosis, if appropriate.     -Is this a Hip/Knee/Shoulder Joint Replacement patient? No    -Is this patient being discharged with medication to prevent blood clots?  Yes, Lovenox   Enoxaparin injection  What is this medicine?  ENOXAPARIN (ee nox a PA rin) is used after knee, hip, or abdominal surgeries to prevent blood clotting. It is also used to treat existing blood clots in the lungs or in the veins.  This medicine may be used for other purposes; ask your health care provider or pharmacist if you have questions.  COMMON BRAND NAME(S): Lovenox  What should I tell my health care provider before I take this medicine?  They need to know if you have any of these conditions:  bleeding disorders, hemorrhage, or hemophilia  infection of the heart or heart valves  kidney or liver disease  previous stroke  prosthetic heart valve  recent surgery or delivery of a baby  ulcer in the stomach or intestine, diverticulitis, or other bowel disease  an unusual  or allergic reaction to enoxaparin, heparin, pork or pork products, other medicines, foods, dyes, or preservatives  pregnant or trying to get pregnant  breast-feeding  How should I use this medicine?  This medicine is for injection under the skin. It is usually given by a health-care professional. You or a family member may be trained on how to give the injections. If you are to give yourself injections, make sure you understand how to use the syringe, measure the dose if necessary, and give the injection. To avoid bruising, do not rub the site where this medicine has been injected. Do not take your medicine more often than directed. Do not stop taking except on the advice of your doctor or health care professional.  Make sure you receive a puncture-resistant container to dispose of the needles and syringes once you have finished with them. Do not reuse these items. Return the container to your doctor or health care professional for proper disposal.  Talk to your pediatrician regarding the use of this medicine in children. Special care may be needed.  Overdosage: If you think you have taken too much of this medicine contact a poison control center or emergency room at once.  NOTE: This medicine is only for you. Do not share this medicine with others.  What if I miss a dose?  If you miss a dose, take it as soon as you can. If it is almost time for your next dose, take only that dose. Do not take double or extra doses.  What may interact with this medicine?  aspirin and aspirin-like medicines  certain medicines that treat or prevent blood clots  dipyridamole  NSAIDs, medicines for pain and inflammation, like ibuprofen or naproxen  This list may not describe all possible interactions. Give your health care provider a list of all the medicines, herbs, non-prescription drugs, or dietary supplements you use. Also tell them if you smoke, drink alcohol, or use illegal drugs. Some items may interact with your medicine.  What  should I watch for while using this medicine?  Visit your healthcare professional for regular checks on your progress. You may need blood work done while you are taking this medicine. Your condition will be monitored carefully while you are receiving this medicine. It is important not to miss any appointments.  If you are going to need surgery or other procedure, tell your healthcare professional that you are using this medicine.  Using this medicine for a long time may weaken your bones and increase the risk of bone fractures.  Avoid sports and activities that might cause injury while you are using this medicine. Severe falls or injuries can cause unseen bleeding. Be careful when using sharp tools or knives. Consider using an electric razor. Take special care brushing or flossing your teeth. Report any injuries, bruising, or red spots on the skin to your healthcare professional.  Wear a medical ID bracelet or chain. Carry a card that describes your disease and details of your medicine and dosage times.  What side effects may I notice from receiving this medicine?  Side effects that you should report to your doctor or health care professional as soon as possible:  allergic reactions like skin rash, itching or hives, swelling of the face, lips, or tongue  bone pain  signs and symptoms of bleeding such as bloody or black, tarry stools; red or dark-brown urine; spitting up blood or brown material that looks like coffee grounds; red spots on the skin; unusual bruising or bleeding from the eye, gums, or nose  signs and symptoms of a blood clot such as chest pain; shortness of breath; pain, swelling, or warmth in the leg  signs and symptoms of a stroke such as changes in vision; confusion; trouble speaking or understanding; severe headaches; sudden numbness or weakness of the face, arm or leg; trouble walking; dizziness; loss of coordination  Side effects that usually do not require medical attention (report to your  doctor or health care professional if they continue or are bothersome):  hair loss  pain, redness, or irritation at site where injected  This list may not describe all possible side effects. Call your doctor for medical advice about side effects. You may report side effects to FDA at 3-273-PMO-7421.  Where should I keep my medicine?  Keep out of the reach of children.  Store at room temperature between 15 and 30 degrees C (59 and 86 degrees F). Do not freeze. If your injections have been specially prepared, you may need to store them in the refrigerator. Ask your pharmacist. Throw away any unused medicine after the expiration date.  NOTE: This sheet is a summary. It may not cover all possible information. If you have questions about this medicine, talk to your doctor, pharmacist, or health care provider.  © 2020 [employer]/Gold Standard (2018-12-13 11:25:34)    -Is this patient being discharged with medication to prevent blood clots?  Yes, Lovenox     Is patient discharged on Warfarin / Coumadin?   No   Diet  Activity  No restrictions, Weight Bearing as tolerated      Diabetic Diet     A Diabetic Diet can help you control your blood glucose, lower your risk of heart disease, improve your blood pressure and maintain a healthy weight.  Eating healthy foods, low in calories, fat and carbohydrates at the same time every day can help control your blood glucose. Carbohydrates can greatly affect your blood glucose levels.  Counting carbs is important to keep your blood glucose at a healthy level, especially if you use insulin or take certain oral diabetes medicines.  Avoid alcohol as it can cause a sudden decrease in blood glucose (hypoglycemia), especially if you use insulin or take certain oral diabetes medicines.

## 2022-07-13 NOTE — CARE PLAN
The patient is Watcher - Medium risk of patient condition declining or worsening    Shift Goals  Clinical Goals: Monitor Surgical site, Hemodynamically stable, Pain management, Safety  Patient Goals: Comfort  Family Goals: CHERYL    Progress made toward(s) clinical / shift goals:    Problem: Pain - Standard  Goal: Alleviation of pain or a reduction in pain to the patient’s comfort goal  Outcome: Progressing     Problem: Fall Risk  Goal: Patient will remain free from falls  Outcome: Progressing     Problem: Knowledge Deficit - Standard  Goal: Patient and family/care givers will demonstrate understanding of plan of care, disease process/condition, diagnostic tests and medications  Outcome: Progressing     Problem: Skin Integrity  Goal: Skin integrity is maintained or improved  Outcome: Progressing     Problem: Knowledge Deficit - COPD  Goal: Patient/significant other demonstrates understanding of disease process, utilization of the Action Plan, medications and discharge instruction  Outcome: Progressing     Problem: Risk for Infection - COPD  Goal: Patient will remain free from signs and symptoms of infection  Outcome: Progressing     Problem: Nutrition - Advanced  Goal: Patient will display progressive weight gain toward goal have adequate food and fluid intake  Outcome: Progressing     Problem: Self Care  Goal: Patient will have the ability to perform ADLs independently or with assistance (bathe, groom, dress, toilet and feed)  Outcome: Progressing

## 2022-07-13 NOTE — PROGRESS NOTES
Bedside report received and patient care assumed. Pt is resting in bed, A&Ox4, with 9/10 complaints of pain, PRN pain medication given and is on 2 LNC. . All fall precautions are in place, belongings at bedside table.  Pt was updated on POC, no questions or concerns. Pt educated on use of call light for assistance.

## 2022-07-13 NOTE — DISCHARGE SUMMARY
Discharge Summary    CHIEF COMPLAINT ON ADMISSION  Chief Complaint   Patient presents with   • Leg Pain     Pt was walking to her car today when felt dizzy and fell. Pt has deformity noted to right thigh. PMS intact. Pt received 20mg Ketamine from EMS as well ads 250ml NS bolus for /60. Pt wears 2L NC at home, on same here sats 94%. Denies any thinners, but does takes ASA daily. Axox4 at this time.        Reason for Admission  EMS     Admission Date  7/6/2022    CODE STATUS  Full Code    HPI & HOSPITAL COURSE  This is a 66 year-old female with a past medical history significant for nicotine dependence, COPD, 2 L of oxygen ,ethanol abuse, type 2 diabetes mellitus, hypertension, hyperlipidemia, meth abuse presented on 7/6/2022 after she had a ground-level fall.      Imaging showed comminuted displaced angulated right proximal femur fracture likely subtrochanteric with intertrochanteric extension.  Orthopedic surgery was consulted, underwent surgical treatment of right subtrochanteric femur fracture with intramedullary device on 7/6/2022.  Per orthopedic surgery, patient can bear weight as tolerated.     Hospital course complicated with acute blood loss anemia secondary to surgery.  Patient hemoglobin noted to be 6.9, s/p 1 unit of PRBC transfusion.  Plan continue with iron deficiency, status post IV iron transfusion.  Today her hemoglobin is noted to be 10.5.  Patient is medically stable to be discharged to skilled nursing facility,      Therefore, she is discharged in fair and stable condition to skilled nursing facility.    The patient met 2-midnight criteria for an inpatient stay at the time of discharge.    Discharge Date  7/13/2022    FOLLOW UP ITEMS POST DISCHARGE  Willis Gomez M.D.  Please follow up with pcp as an op     DISCHARGE DIAGNOSES  Principal Problem:    Femur fracture (HCC) POA: Unknown  Active Problems:    COPD (chronic obstructive pulmonary disease) (HCC) (Chronic) POA: Yes     Hyperlipidemia POA: Unknown    Type 2 diabetes mellitus with complication (HCC) (Chronic) POA: Yes    MAXIMILIANO (acute kidney injury) (HCC) POA: Unknown    Other fracture of right femur, initial encounter for closed fracture (HCC) POA: Yes    Methamphetamine abuse (HCC) POA: Unknown    Anemia POA: Unknown    Thrombocytopenia (HCC) POA: Unknown  Resolved Problems:    * No resolved hospital problems. *      FOLLOW UP  No future appointments.  Willis Gomez M.D.  [address]  [facility] [id number] [phone]    Follow up  Please call your primary care provider to schedule a hospital follow up. Thank you.      MEDICATIONS ON DISCHARGE     Medication List      START taking these medications      Instructions   docusate sodium 100 MG Caps   Take 100 mg by mouth 2 times a day.  Dose: 100 mg     enoxaparin 40 MG/0.4ML Sosy inj  Commonly known as: Lovenox   Inject 40 mg under the skin every day for 23 days.  Dose: 40 mg     oxyCODONE immediate-release 5 MG Tabs  Commonly known as: ROXICODONE   Take 1 Tablet by mouth every 8 hours as needed for Severe Pain for up to 2 days.  Dose: 5 mg     polyethylene glycol/lytes 17 g Pack  Commonly known as: MIRALAX   Take 1 Packet by mouth 2 times a day as needed (if sennosides and/or docusate ineffective or not ordered).  Dose: 17 g     tamsulosin 0.4 MG capsule  Commonly known as: FLOMAX   Take 1 Capsule by mouth 1/2 hour after breakfast.  Dose: 0.4 mg        CHANGE how you take these medications      Instructions   simvastatin 20 MG Tabs  What changed: when to take this  Commonly known as: ZOCOR   TAKE ONE TABLET BY MOUTH ONCE DAILY IN THE EVENING        CONTINUE taking these medications      Instructions   albuterol 108 (90 Base) MCG/ACT Aers inhalation aerosol   Inhale 2 Puffs by mouth every 6 hours as needed for Shortness of Breath.  Dose: 2 Puff     aspirin EC 81 MG Tbec  Commonly known as: ECOTRIN   Take 81 mg by mouth every day.  Dose: 81 mg     carvedilol 25 MG  Tabs  Commonly known as: COREG   TAKE ONE TABLET BY MOUTH TWICE DAILY WITH MEALS     COMBIVENT INH   Inhale  by mouth.     cyclobenzaprine 10 mg Tabs  Commonly known as: Flexeril   Take 10 mg by mouth at bedtime.  Dose: 10 mg     famotidine 20 MG Tabs  Commonly known as: PEPCID   Take 20 mg by mouth in the morning and 20 mg in the evening.  Dose: 20 mg     gabapentin 300 MG Caps  Commonly known as: NEURONTIN   Take 600-900 mg by mouth at bedtime.  Dose: 600-900 mg     Jardiance 25 MG Tabs  Generic drug: Empagliflozin   Take 1 Tablet by mouth every day.  Dose: 1 Tablet     losartan 50 MG Tabs  Commonly known as: COZAAR   Take 1 Tab by mouth 2 Times a Day.  Dose: 50 mg     nicotine 14 MG/24HR Pt24  Commonly known as: NICODERM   Place 1 Patch on the skin every 24 hours.  Dose: 1 Patch     NovoLOG FlexPen 100 UNIT/ML injection PEN  Generic drug: insulin aspart   Inject  under the skin 2 times a day. PER SLIDING SCALE:  2 units for level 150+, then additional 2 units for every 50 above.     Spiriva Respimat 2.5 mcg/Act Aers  Generic drug: tiotropium   Inhale 5 mcg every day.  Dose: 5 mcg     Tradjenta 5 MG Tabs tablet  Generic drug: linagliptin   Take 5 mg by mouth every day.  Dose: 5 mg        STOP taking these medications    glipiZIDE 10 MG Tabs  Commonly known as: GLUCOTROL            Allergies  Allergies   Allergen Reactions   • Advair Diskus      Rash/hives   • Codeine    • Lisinopril    • Sulfa Drugs        DIET  Orders Placed This Encounter   Procedures   • Diet Order Diet: Consistent CHO (Diabetic)     Standing Status:   Standing     Number of Occurrences:   1     Order Specific Question:   Diet:     Answer:   Consistent CHO (Diabetic) [4]       ACTIVITY  As tolerated.  Weight bearing as tolerated    CONSULTATIONS  ortho    PROCEDURES  Surgical treatment of right subtrochanteric femur fracture with intramedullary device       LABORATORY  Lab Results   Component Value Date    SODIUM 134 (L) 07/11/2022    POTASSIUM  3.9 07/11/2022    CHLORIDE 98 07/11/2022    CO2 25 07/11/2022    GLUCOSE 167 (H) 07/11/2022    BUN 20 07/11/2022    CREATININE 0.57 07/11/2022    CREATININE 0.9 10/30/2008        Lab Results   Component Value Date    WBC 9.6 07/12/2022    HEMOGLOBIN 10.5 (L) 07/12/2022    HEMATOCRIT 33.8 (L) 07/12/2022    PLATELETCT 278 07/12/2022        Total time of the discharge process exceeds 35 minutes.    I went to the bedside of the patient, evaluated cardiology consulted.  Patient is alert and oriented, answering questions appropriately.  At this time patient is medically stable to be discharge to skilled nursing home.

## 2022-07-13 NOTE — PROGRESS NOTES
"   Orthopaedic Progress Note    Interval changes:  Patient doing well  Cleared for DC by ortho pending medicine clearance    ROS - Patient denies any new issues.  Pain well controlled.    BP (!) 152/79   Pulse 84   Temp 36.4 °C (97.6 °F)   Resp 16   Ht 1.626 m (5' 4\")   Wt 66.1 kg (145 lb 11.6 oz)   SpO2 96%       Patient seen and examined  No acute distress  Breathing non labored  RRR  RLE surgical dressings are clean, dry, and intact. Patient clearly fires tibialis anterior, EHL, and gastrocnemius/soleus. Sensation is intact to light touch throughout superficial peroneal, deep peroneal, tibial, saphenous, and sural nerve distributions. Strong and palpable 2+ dorsalis pedis and posterior tibial pulses with capillary refill less than 2 seconds. No lower leg tenderness or discomfort.       Recent Labs     07/11/22  0327 07/12/22  0749   WBC 13.6* 9.6   RBC 3.95* 4.14*   HEMOGLOBIN 10.1* 10.5*   HEMATOCRIT 31.3* 33.8*   MCV 79.2* 81.6   MCH 25.6* 25.4*   MCHC 32.3* 31.1*   RDW 43.4 45.1   PLATELETCT 255 278   MPV 9.9 9.2       Active Hospital Problems    Diagnosis    • Anemia [D64.9]    • Thrombocytopenia ([facility]) [D69.6]    • Femur fracture ([facility]) [S72.90XA]    • Other fracture of right femur, initial encounter for closed fracture ([facility]) [S72.8X1A]    • Methamphetamine abuse ([facility]) [F15.10]    • MAXIMILIANO (acute kidney injury) ([facility]) [N17.9]    • Type 2 diabetes mellitus with complication ([facility]) [E11.8]    • Hyperlipidemia [E78.5]    • COPD (chronic obstructive pulmonary disease) ([facility]) [J44.9]        Assessment/Plan:  Patient doing well  Cleared for DC by ortho pending medicine clearance  POD#7 S/P Surgical treatment of right subtrochanteric femur fracture with intramedullary device  Wt bearing status - WBAT  Wound care/Drains - Dressings to be changed every other day by nursing  Future Procedures - none planned   Lovenox: Start 7/7, Duration-until ambulatory > 150'  Sutures/Staples out- 14 days post " operatively  PT/OT-initiated  Antibiotics:  Perioperative completed  DVT Prophylaxis- TEDS/SCDs/Foot pumps  Landeros-none  Case Coordination for Discharge Planning - Disposition SNF

## 2022-07-14 NOTE — PROGRESS NOTES
Discharge instructions provided to pt.  Pt states understanding.  Pt states all questions have been answered.  Copy of discharge provided to pt.  Signed copy in chart.

## 2022-11-07 ENCOUNTER — PATIENT MESSAGE (OUTPATIENT)
Dept: HEALTH INFORMATION MANAGEMENT | Facility: OTHER | Age: 67
End: 2022-11-07

## 2022-11-30 ENCOUNTER — APPOINTMENT (OUTPATIENT)
Dept: RADIOLOGY | Facility: MEDICAL CENTER | Age: 67
End: 2022-11-30
Attending: EMERGENCY MEDICINE
Payer: MEDICARE

## 2022-11-30 ENCOUNTER — HOSPITAL ENCOUNTER (EMERGENCY)
Facility: MEDICAL CENTER | Age: 67
End: 2022-11-30
Attending: EMERGENCY MEDICINE
Payer: MEDICARE

## 2022-11-30 VITALS
OXYGEN SATURATION: 90 % | DIASTOLIC BLOOD PRESSURE: 90 MMHG | RESPIRATION RATE: 18 BRPM | TEMPERATURE: 98.3 F | HEART RATE: 98 BPM | WEIGHT: 120 LBS | HEIGHT: 64 IN | BODY MASS INDEX: 20.49 KG/M2 | SYSTOLIC BLOOD PRESSURE: 161 MMHG

## 2022-11-30 DIAGNOSIS — M79.605 CHRONIC PAIN OF BOTH LOWER EXTREMITIES: ICD-10-CM

## 2022-11-30 DIAGNOSIS — M79.604 CHRONIC PAIN OF BOTH LOWER EXTREMITIES: ICD-10-CM

## 2022-11-30 DIAGNOSIS — J40 BRONCHITIS: ICD-10-CM

## 2022-11-30 DIAGNOSIS — G89.29 CHRONIC PAIN OF BOTH LOWER EXTREMITIES: ICD-10-CM

## 2022-11-30 DIAGNOSIS — J44.1 ACUTE EXACERBATION OF CHRONIC OBSTRUCTIVE PULMONARY DISEASE (COPD) (HCC): ICD-10-CM

## 2022-11-30 LAB
ALBUMIN SERPL BCP-MCNC: 3.7 G/DL (ref 3.2–4.9)
ALBUMIN/GLOB SERPL: 1.1 G/DL
ALP SERPL-CCNC: 108 U/L (ref 30–99)
ALT SERPL-CCNC: 59 U/L (ref 2–50)
ANION GAP SERPL CALC-SCNC: 9 MMOL/L (ref 7–16)
AST SERPL-CCNC: 60 U/L (ref 12–45)
BASOPHILS # BLD AUTO: 0.8 % (ref 0–1.8)
BASOPHILS # BLD: 0.05 K/UL (ref 0–0.12)
BILIRUB SERPL-MCNC: 0.7 MG/DL (ref 0.1–1.5)
BUN SERPL-MCNC: 11 MG/DL (ref 8–22)
CALCIUM SERPL-MCNC: 9.1 MG/DL (ref 8.5–10.5)
CHLORIDE SERPL-SCNC: 101 MMOL/L (ref 96–112)
CO2 SERPL-SCNC: 25 MMOL/L (ref 20–33)
CREAT SERPL-MCNC: 0.56 MG/DL (ref 0.5–1.4)
EKG IMPRESSION: NORMAL
EOSINOPHIL # BLD AUTO: 0.21 K/UL (ref 0–0.51)
EOSINOPHIL NFR BLD: 3.3 % (ref 0–6.9)
ERYTHROCYTE [DISTWIDTH] IN BLOOD BY AUTOMATED COUNT: 44.3 FL (ref 35.9–50)
GFR SERPLBLD CREATININE-BSD FMLA CKD-EPI: 99 ML/MIN/1.73 M 2
GLOBULIN SER CALC-MCNC: 3.3 G/DL (ref 1.9–3.5)
GLUCOSE SERPL-MCNC: 200 MG/DL (ref 65–99)
HCT VFR BLD AUTO: 31.2 % (ref 37–47)
HGB BLD-MCNC: 9.6 G/DL (ref 12–16)
IMM GRANULOCYTES # BLD AUTO: 0.01 K/UL (ref 0–0.11)
IMM GRANULOCYTES NFR BLD AUTO: 0.2 % (ref 0–0.9)
LYMPHOCYTES # BLD AUTO: 1.49 K/UL (ref 1–4.8)
LYMPHOCYTES NFR BLD: 23.1 % (ref 22–41)
MCH RBC QN AUTO: 24 PG (ref 27–33)
MCHC RBC AUTO-ENTMCNC: 30.8 G/DL (ref 33.6–35)
MCV RBC AUTO: 78 FL (ref 81.4–97.8)
MONOCYTES # BLD AUTO: 0.79 K/UL (ref 0–0.85)
MONOCYTES NFR BLD AUTO: 12.2 % (ref 0–13.4)
NEUTROPHILS # BLD AUTO: 3.9 K/UL (ref 2–7.15)
NEUTROPHILS NFR BLD: 60.4 % (ref 44–72)
NRBC # BLD AUTO: 0 K/UL
NRBC BLD-RTO: 0 /100 WBC
PLATELET # BLD AUTO: 312 K/UL (ref 164–446)
PMV BLD AUTO: 8.9 FL (ref 9–12.9)
POTASSIUM SERPL-SCNC: 3.4 MMOL/L (ref 3.6–5.5)
PROT SERPL-MCNC: 7 G/DL (ref 6–8.2)
RBC # BLD AUTO: 4 M/UL (ref 4.2–5.4)
SODIUM SERPL-SCNC: 135 MMOL/L (ref 135–145)
WBC # BLD AUTO: 6.5 K/UL (ref 4.8–10.8)

## 2022-11-30 PROCEDURE — 96372 THER/PROPH/DIAG INJ SC/IM: CPT

## 2022-11-30 PROCEDURE — 93005 ELECTROCARDIOGRAM TRACING: CPT

## 2022-11-30 PROCEDURE — 80053 COMPREHEN METABOLIC PANEL: CPT

## 2022-11-30 PROCEDURE — 93005 ELECTROCARDIOGRAM TRACING: CPT | Performed by: EMERGENCY MEDICINE

## 2022-11-30 PROCEDURE — 85025 COMPLETE CBC W/AUTO DIFF WBC: CPT

## 2022-11-30 PROCEDURE — 700111 HCHG RX REV CODE 636 W/ 250 OVERRIDE (IP): Performed by: EMERGENCY MEDICINE

## 2022-11-30 PROCEDURE — 99284 EMERGENCY DEPT VISIT MOD MDM: CPT

## 2022-11-30 PROCEDURE — 71045 X-RAY EXAM CHEST 1 VIEW: CPT

## 2022-11-30 PROCEDURE — 36415 COLL VENOUS BLD VENIPUNCTURE: CPT

## 2022-11-30 RX ORDER — PREDNISONE 20 MG/1
40 TABLET ORAL DAILY
Qty: 10 TABLET | Refills: 0 | Status: ON HOLD | OUTPATIENT
Start: 2022-11-30 | End: 2022-12-06

## 2022-11-30 RX ORDER — CYCLOBENZAPRINE HCL 10 MG
10 TABLET ORAL 3 TIMES DAILY PRN
Qty: 15 TABLET | Refills: 0 | Status: SHIPPED | OUTPATIENT
Start: 2022-11-30 | End: 2023-12-15

## 2022-11-30 RX ORDER — AZITHROMYCIN 250 MG/1
TABLET, FILM COATED ORAL
Qty: 6 TABLET | Refills: 0 | Status: ON HOLD | OUTPATIENT
Start: 2022-11-30 | End: 2022-12-06

## 2022-11-30 RX ORDER — KETOROLAC TROMETHAMINE 30 MG/ML
15 INJECTION, SOLUTION INTRAMUSCULAR; INTRAVENOUS ONCE
Status: COMPLETED | OUTPATIENT
Start: 2022-11-30 | End: 2022-11-30

## 2022-11-30 RX ORDER — PREDNISONE 20 MG/1
40 TABLET ORAL ONCE
Status: COMPLETED | OUTPATIENT
Start: 2022-11-30 | End: 2022-11-30

## 2022-11-30 RX ADMIN — PREDNISONE 40 MG: 20 TABLET ORAL at 05:50

## 2022-11-30 RX ADMIN — KETOROLAC TROMETHAMINE 15 MG: 30 INJECTION, SOLUTION INTRAMUSCULAR at 08:08

## 2022-11-30 ASSESSMENT — ENCOUNTER SYMPTOMS
MYALGIAS: 1
HEADACHES: 0
ABDOMINAL PAIN: 0
COUGH: 1
NAUSEA: 1
FEVER: 0
VOMITING: 1
SHORTNESS OF BREATH: 1

## 2022-11-30 ASSESSMENT — FIBROSIS 4 INDEX: FIB4 SCORE: 1.06

## 2022-11-30 NOTE — ED PROVIDER NOTES
ED Provider Note    ED Provider Note    Primary care provider: Willis Gomez M.D.  Means of arrival: POV  History obtained from: Patient  History limited by: None    CHIEF COMPLAINT  Chief Complaint   Patient presents with    Shortness of Breath     X 2 days with productive cough. Hx of COPD wear 3L NC at baseline        HPI  Mckenna Sewell is a 67 y.o. female who presents to the Emergency Department accompanied by her son with a chief complaint of difficulty breathing.  Patient states its been worse over the last 2 or 3 days.  She has a history of COPD and is oxygen dependent.  She is on 3 L of O2 at all times.  She has a nebulizer and an MDI at home.  She denies running out of medications.  She is a longtime smoker.  She states about a month ago she moved into a new apartment and there is no heat and this caused her to get sick leading to the symptoms she presents today.  She denies having a fever.  She has had a cough.  She reports an episode of nausea and vomiting this morning.  No diarrhea.  She denies chest pain or headache.  She reports a history of neuropathy and having chronic pain in her bilateral lower extremities.  Few days ago, she states her legs were so swollen he could not see her ankles, that has since improved but she still having crampy pain in her legs which is chronic.  She reports significant weight loss about 30 pounds since July of this year.  She states that this is related to her hip surgery which occurred in early July.  At that time, she was living in an apartment that was 2 floors and she was afraid to go downstairs to the kitchen so she simply stopped eating as much as she normally did.  However, this is continued since she has moved out of the apartment.    REVIEW OF SYSTEMS  Review of Systems   Constitutional:  Negative for fever.   Respiratory:  Positive for cough and shortness of breath.    Cardiovascular:  Positive for leg swelling. Negative for chest pain.   Gastrointestinal:   Positive for nausea and vomiting. Negative for abdominal pain.   Genitourinary:  Negative for dysuria.   Musculoskeletal:  Positive for myalgias.   Neurological:  Negative for headaches.     PAST MEDICAL HISTORY   has a past medical history of MAXIMILIANO (acute kidney injury) (HCC) (5/15/2022), Benign essential hypertension, CAD (coronary artery disease), CHF (congestive heart failure) (HCC), Congestive heart failure (HCC), COPD, COPD (chronic obstructive pulmonary disease) (HCC), Dilated cardiomyopathy (HCC), History of cardiac catheterization, Hypertension, Mixed hyperlipidemia, Nicotine dependence, Nicotine dependence, and Type 2 diabetes mellitus with complication ([facility]).    SURGICAL HISTORY   has a past surgical history that includes other cardiac surgery; appendectomy; and open fix inter/subtroch fx,implnt (Right, 7/6/2022).    SOCIAL HISTORY  Social History     Tobacco Use    Smoking status: Every Day     Packs/day: 0.50     Years: 40.00     Pack years: 20.00     Types: Cigarettes    Smokeless tobacco: Never    Tobacco comments:     1/2 ppd   Vaping Use    Vaping Use: Never used   Substance Use Topics    Alcohol use: No    Drug use: No      Social History     Substance and Sexual Activity   Drug Use No       FAMILY HISTORY  Family History   Problem Relation Age of Onset    Heart Disease Father     Heart Disease Mother     Heart Disease Maternal Aunt     Heart Attack Maternal Aunt     Heart Disease Maternal Uncle        CURRENT MEDICATIONS  Home Medications       Reviewed by Sonali Villarreal RAntoinetteNAntoinette (Registered Nurse) on 11/30/22 at 0327  Med List Status: Not Addressed     Medication Last Dose Status   albuterol (VENTOLIN OR PROVENTIL) 108 (90 BASE) MCG/ACT Aero Soln inhalation aerosol  Active   aspirin EC (ECOTRIN) 81 MG TBEC  Active   atorvastatin (LIPITOR) 10 MG Tab  Active   carvedilol (COREG) 25 MG Tab  Active   cyclobenzaprine (FLEXERIL) 10 mg Tab  Active   docusate sodium 100 MG Cap  Active   Empagliflozin  "(JARDIANCE) 25 MG Tab  Active   famotidine (PEPCID) 20 MG Tab  Active   gabapentin (NEURONTIN) 300 MG Cap  Active   insulin aspart (NOVOLOG FLEXPEN) 100 UNIT/ML injection PEN  Active   Ipratropium-Albuterol (COMBIVENT INH)  Active   linagliptin (TRADJENTA) 5 MG Tab tablet  Active   losartan (COZAAR) 50 MG Tab  Active   nicotine (NICODERM) 14 MG/24HR PATCH 24 HR  Active   polyethylene glycol/lytes (MIRALAX) 17 g Pack  Active   simvastatin (ZOCOR) 20 MG Tab  Active   tamsulosin (FLOMAX) 0.4 MG capsule  Active   tiotropium (SPIRIVA RESPIMAT) 2.5 mcg/Act Aero Soln  Active   traMADol (ULTRAM) 50 MG Tab  Active                    ALLERGIES  Allergies   Allergen Reactions    Advair Diskus      Rash/hives    Codeine     Codeine     Fluticasone-Salmeterol     Lisinopril     Sulfa Drugs        PHYSICAL EXAM  VITAL SIGNS: BP (!) 161/90   Pulse 98   Temp 36.8 °C (98.3 °F) (Temporal)   Resp 18   Ht 1.626 m (5' 4\")   Wt 54.4 kg (120 lb)   SpO2 90%   BMI 20.60 kg/m²   Vitals reviewed.  Constitutional: Patient is oriented to person, place, and time.  Chronically ill-appearing.  Mild distress.    Head: Normocephalic and atraumatic.   Ears: Normal external ears bilaterally.   Mouth/Throat: Oropharynx is clear, dry mucous membranes.  Eyes: Conjunctivae are normal. Pupils are equal, round.  Neck: Normal range of motion. Neck supple.  Cardiovascular: Normal rate, regular rhythm and normal heart sounds. Normal peripheral pulses.  Pulmonary/Chest: Effort normal and breath sounds normal. No respiratory distress, no wheezes, rhonchi, or rales. No chest wall tenderness.  Abdominal: Soft. Bowel sounds are normal. There is no tenderness. No rebound or guarding, or peritoneal signs.  Musculoskeletal: Symmetric, bilateral lower extremity trace to 1+ edema.  There is diffuse pain without erythema or cellulitic changes noted.  Neurological: No focal deficits.   Skin: Skin is warm and dry. No erythema. No pallor.   Psychiatric: Patient has a " normal mood and affect.     LABS  Results for orders placed or performed during the hospital encounter of 11/30/22   CBC with Differential   Result Value Ref Range    WBC 6.5 4.8 - 10.8 K/uL    RBC 4.00 (L) 4.20 - 5.40 M/uL    Hemoglobin 9.6 (L) 12.0 - 16.0 g/dL    Hematocrit 31.2 (L) 37.0 - 47.0 %    MCV 78.0 (L) 81.4 - 97.8 fL    MCH 24.0 (L) 27.0 - 33.0 pg    MCHC 30.8 (L) 33.6 - 35.0 g/dL    RDW 44.3 35.9 - 50.0 fL    Platelet Count 312 164 - 446 K/uL    MPV 8.9 (L) 9.0 - 12.9 fL    Neutrophils-Polys 60.40 44.00 - 72.00 %    Lymphocytes 23.10 22.00 - 41.00 %    Monocytes 12.20 0.00 - 13.40 %    Eosinophils 3.30 0.00 - 6.90 %    Basophils 0.80 0.00 - 1.80 %    Immature Granulocytes 0.20 0.00 - 0.90 %    Nucleated RBC 0.00 /100 WBC    Neutrophils (Absolute) 3.90 2.00 - 7.15 K/uL    Lymphs (Absolute) 1.49 1.00 - 4.80 K/uL    Monos (Absolute) 0.79 0.00 - 0.85 K/uL    Eos (Absolute) 0.21 0.00 - 0.51 K/uL    Baso (Absolute) 0.05 0.00 - 0.12 K/uL    Immature Granulocytes (abs) 0.01 0.00 - 0.11 K/uL    NRBC (Absolute) 0.00 K/uL   Comp Metabolic Panel   Result Value Ref Range    Sodium 135 135 - 145 mmol/L    Potassium 3.4 (L) 3.6 - 5.5 mmol/L    Chloride 101 96 - 112 mmol/L    Co2 25 20 - 33 mmol/L    Anion Gap 9.0 7.0 - 16.0    Glucose 200 (H) 65 - 99 mg/dL    Bun 11 8 - 22 mg/dL    Creatinine 0.56 0.50 - 1.40 mg/dL    Calcium 9.1 8.5 - 10.5 mg/dL    AST(SGOT) 60 (H) 12 - 45 U/L    ALT(SGPT) 59 (H) 2 - 50 U/L    Alkaline Phosphatase 108 (H) 30 - 99 U/L    Total Bilirubin 0.7 0.1 - 1.5 mg/dL    Albumin 3.7 3.2 - 4.9 g/dL    Total Protein 7.0 6.0 - 8.2 g/dL    Globulin 3.3 1.9 - 3.5 g/dL    A-G Ratio 1.1 g/dL   ESTIMATED GFR   Result Value Ref Range    GFR (CKD-EPI) 99 >60 mL/min/1.73 m 2   EKG   Result Value Ref Range    Report       [facility] Emergency Dept.    Test Date:  2022-11-30  Pt Name:    RUPESH WONG                 Department: ER  MRN:        [id number]                      Room:         05  Gender:     Female                       Technician: [id number]  :        1955                   Requested By:ER TRIAGE PROTOCOL  Order #:    [id number]                    Reading MD: MELISSA BERRIOS DO    Measurements  Intervals                                Axis  Rate:       87                           P:          70  AR:         136                          QRS:        127  QRSD:       149                          T:  QT:         447  QTc:        538    Interpretive Statements  Atrial-sensed ventricular-paced rhythm  No further analysis attempted due to paced rhythm  Compared to ECG 2022 17:09:38  No significant changes  Electronically Signed On 2022 4:25:13 PST by MELISSA BERRIOS DO         All labs reviewed by me.    EKG Interpretation  Interpreted by me    RADIOLOGY  DX-CHEST-PORTABLE (1 VIEW)   Final Result         1.  Left basilar atelectasis or early infiltrate.   2.  Trace left pleural effusion   3.  Atherosclerosis   4.  Perihilar interstitial prominence and bronchial wall cuffing, appearance suggests changes of underlying bronchial inflammation, consider bronchitis.        The radiologist's interpretation of all radiological studies have been reviewed by me.    COURSE & MEDICAL DECISION MAKING  Pertinent Labs & Imaging studies reviewed. (See chart for details)    Obtained and reviewed past medical records.  Patient's last encounter was in the orthopedic clinic for follow-up on right hip pain after an injury and a history of right hip surgery.  She was hospitalized in July of this year for femur fracture.  She is also noted to have a history of COPD, hyperlipidemia, diabetes, methamphetamine abuse thrombocytopenia MAXIMILIANO.    4:00 AM - Patient seen and examined at bedside.  This is a 67-year-old female.  She is oxygen dependent.  She is not requiring additional oxygen, above her baseline requirement.  Sounds are diminished but there is no acute wheezing or rhonchi.  No rales.  Has a history  of COPD.  She presents with several days of feeling short of breath.  She has a chronic cough.  She is afebrile.  Labs ordered per nursing protocols as well as chest x-ray.  I reviewed her EKG.      7:15 AM patient's reevaluated bedside.  She is feeling better.  She is on her normal level of supplemental oxygen.  We discussed lab results and x-ray findings.  At this point, I feel she can safely be discharged home.  We will continue pulse dose of steroids and start her on azithromycin based on her history of COPD and x-ray findings.  She remains afebrile.  Her chronic leg pain is benefited somewhat by Flexeril which she takes normally but is out and she is also given a prescription for this.  She is discharged in stable condition.      FINAL IMPRESSION  1. Acute exacerbation of chronic obstructive pulmonary disease (COPD) (HCC)    2. Bronchitis    3. Chronic pain of both lower extremities

## 2022-11-30 NOTE — ED TRIAGE NOTES
"Chief Complaint   Patient presents with    Shortness of Breath     X 2 days with productive cough. Hx of COPD wear 3L NC at baseline      Pt BIB EMS for above complaint. Pt sating at 95% on baseline 3L NC. Pt received 1 duoneb and 1 albuterol treatment prior to arrival with improvement in symptoms. Chart up for ERP.     BP (!) 175/88   Pulse 87   Temp 36.8 °C (98.2 °F) (Temporal)   Resp 20   Ht 1.626 m (5' 4\")   Wt 54.4 kg (120 lb)   SpO2 93%   BMI 20.60 kg/m²     "

## 2022-12-03 ENCOUNTER — APPOINTMENT (OUTPATIENT)
Dept: RADIOLOGY | Facility: MEDICAL CENTER | Age: 67
DRG: 291 | End: 2022-12-03
Attending: EMERGENCY MEDICINE
Payer: MEDICARE

## 2022-12-03 ENCOUNTER — HOSPITAL ENCOUNTER (INPATIENT)
Facility: MEDICAL CENTER | Age: 67
LOS: 3 days | DRG: 291 | End: 2022-12-06
Attending: EMERGENCY MEDICINE | Admitting: HOSPITALIST
Payer: MEDICARE

## 2022-12-03 DIAGNOSIS — I50.9 ACUTE CONGESTIVE HEART FAILURE, UNSPECIFIED HEART FAILURE TYPE (HCC): ICD-10-CM

## 2022-12-03 DIAGNOSIS — I50.813 ACUTE ON CHRONIC RIGHT-SIDED CONGESTIVE HEART FAILURE (HCC): Chronic | ICD-10-CM

## 2022-12-03 DIAGNOSIS — F17.219 CIGARETTE NICOTINE DEPENDENCE WITH NICOTINE-INDUCED DISORDER: Chronic | ICD-10-CM

## 2022-12-03 DIAGNOSIS — R06.00 DYSPNEA, UNSPECIFIED TYPE: ICD-10-CM

## 2022-12-03 DIAGNOSIS — I50.20 SYSTOLIC CONGESTIVE HEART FAILURE, UNSPECIFIED HF CHRONICITY (HCC): Chronic | ICD-10-CM

## 2022-12-03 DIAGNOSIS — J96.21 ACUTE ON CHRONIC RESPIRATORY FAILURE WITH HYPOXIA (HCC): ICD-10-CM

## 2022-12-03 DIAGNOSIS — J96.01 ACUTE RESPIRATORY FAILURE WITH HYPOXIA (HCC): ICD-10-CM

## 2022-12-03 DIAGNOSIS — E11.8 TYPE 2 DIABETES MELLITUS WITH COMPLICATION (HCC): Chronic | ICD-10-CM

## 2022-12-03 DIAGNOSIS — E87.6 HYPOKALEMIA DUE TO EXCESSIVE RENAL LOSS OF POTASSIUM: ICD-10-CM

## 2022-12-03 LAB
ALBUMIN SERPL BCP-MCNC: 3.7 G/DL (ref 3.2–4.9)
ALBUMIN/GLOB SERPL: 1.2 G/DL
ALP SERPL-CCNC: 102 U/L (ref 30–99)
ALT SERPL-CCNC: 40 U/L (ref 2–50)
ANION GAP SERPL CALC-SCNC: 11 MMOL/L (ref 7–16)
AST SERPL-CCNC: 33 U/L (ref 12–45)
BASOPHILS # BLD AUTO: 0.5 % (ref 0–1.8)
BASOPHILS # BLD: 0.05 K/UL (ref 0–0.12)
BILIRUB SERPL-MCNC: 0.3 MG/DL (ref 0.1–1.5)
BUN SERPL-MCNC: 22 MG/DL (ref 8–22)
CALCIUM SERPL-MCNC: 8.6 MG/DL (ref 8.5–10.5)
CHLORIDE SERPL-SCNC: 102 MMOL/L (ref 96–112)
CO2 SERPL-SCNC: 22 MMOL/L (ref 20–33)
CREAT SERPL-MCNC: 0.69 MG/DL (ref 0.5–1.4)
EKG IMPRESSION: NORMAL
EOSINOPHIL # BLD AUTO: 0.18 K/UL (ref 0–0.51)
EOSINOPHIL NFR BLD: 1.9 % (ref 0–6.9)
ERYTHROCYTE [DISTWIDTH] IN BLOOD BY AUTOMATED COUNT: 46.5 FL (ref 35.9–50)
FLUAV RNA SPEC QL NAA+PROBE: NEGATIVE
FLUBV RNA SPEC QL NAA+PROBE: NEGATIVE
GFR SERPLBLD CREATININE-BSD FMLA CKD-EPI: 94 ML/MIN/1.73 M 2
GLOBULIN SER CALC-MCNC: 3 G/DL (ref 1.9–3.5)
GLUCOSE BLD STRIP.AUTO-MCNC: 155 MG/DL (ref 65–99)
GLUCOSE BLD STRIP.AUTO-MCNC: 249 MG/DL (ref 65–99)
GLUCOSE BLD STRIP.AUTO-MCNC: 282 MG/DL (ref 65–99)
GLUCOSE SERPL-MCNC: 272 MG/DL (ref 65–99)
HCT VFR BLD AUTO: 28.5 % (ref 37–47)
HGB BLD-MCNC: 8.7 G/DL (ref 12–16)
IMM GRANULOCYTES # BLD AUTO: 0.03 K/UL (ref 0–0.11)
IMM GRANULOCYTES NFR BLD AUTO: 0.3 % (ref 0–0.9)
LYMPHOCYTES # BLD AUTO: 2.88 K/UL (ref 1–4.8)
LYMPHOCYTES NFR BLD: 30.8 % (ref 22–41)
MAGNESIUM SERPL-MCNC: 1.8 MG/DL (ref 1.5–2.5)
MCH RBC QN AUTO: 24.3 PG (ref 27–33)
MCHC RBC AUTO-ENTMCNC: 30.5 G/DL (ref 33.6–35)
MCV RBC AUTO: 79.6 FL (ref 81.4–97.8)
MONOCYTES # BLD AUTO: 0.89 K/UL (ref 0–0.85)
MONOCYTES NFR BLD AUTO: 9.5 % (ref 0–13.4)
NEUTROPHILS # BLD AUTO: 5.31 K/UL (ref 2–7.15)
NEUTROPHILS NFR BLD: 57 % (ref 44–72)
NRBC # BLD AUTO: 0 K/UL
NRBC BLD-RTO: 0 /100 WBC
NT-PROBNP SERPL IA-MCNC: 4966 PG/ML (ref 0–125)
PLATELET # BLD AUTO: 299 K/UL (ref 164–446)
PMV BLD AUTO: 9.2 FL (ref 9–12.9)
POTASSIUM SERPL-SCNC: 3.7 MMOL/L (ref 3.6–5.5)
PROT SERPL-MCNC: 6.7 G/DL (ref 6–8.2)
RBC # BLD AUTO: 3.58 M/UL (ref 4.2–5.4)
RSV RNA SPEC QL NAA+PROBE: NEGATIVE
SARS-COV-2 RNA RESP QL NAA+PROBE: NOTDETECTED
SODIUM SERPL-SCNC: 135 MMOL/L (ref 135–145)
SPECIMEN SOURCE: NORMAL
TROPONIN T SERPL-MCNC: 25 NG/L (ref 6–19)
WBC # BLD AUTO: 9.3 K/UL (ref 4.8–10.8)

## 2022-12-03 PROCEDURE — 83735 ASSAY OF MAGNESIUM: CPT

## 2022-12-03 PROCEDURE — 94640 AIRWAY INHALATION TREATMENT: CPT

## 2022-12-03 PROCEDURE — 96374 THER/PROPH/DIAG INJ IV PUSH: CPT

## 2022-12-03 PROCEDURE — 85025 COMPLETE CBC W/AUTO DIFF WBC: CPT

## 2022-12-03 PROCEDURE — 700101 HCHG RX REV CODE 250: Performed by: EMERGENCY MEDICINE

## 2022-12-03 PROCEDURE — 83880 ASSAY OF NATRIURETIC PEPTIDE: CPT

## 2022-12-03 PROCEDURE — 96372 THER/PROPH/DIAG INJ SC/IM: CPT

## 2022-12-03 PROCEDURE — 80053 COMPREHEN METABOLIC PANEL: CPT

## 2022-12-03 PROCEDURE — 700102 HCHG RX REV CODE 250 W/ 637 OVERRIDE(OP)

## 2022-12-03 PROCEDURE — 84484 ASSAY OF TROPONIN QUANT: CPT

## 2022-12-03 PROCEDURE — 71045 X-RAY EXAM CHEST 1 VIEW: CPT

## 2022-12-03 PROCEDURE — 700111 HCHG RX REV CODE 636 W/ 250 OVERRIDE (IP): Performed by: HOSPITALIST

## 2022-12-03 PROCEDURE — C9803 HOPD COVID-19 SPEC COLLECT: HCPCS | Performed by: EMERGENCY MEDICINE

## 2022-12-03 PROCEDURE — 99285 EMERGENCY DEPT VISIT HI MDM: CPT

## 2022-12-03 PROCEDURE — 700111 HCHG RX REV CODE 636 W/ 250 OVERRIDE (IP): Performed by: EMERGENCY MEDICINE

## 2022-12-03 PROCEDURE — 700102 HCHG RX REV CODE 250 W/ 637 OVERRIDE(OP): Performed by: HOSPITALIST

## 2022-12-03 PROCEDURE — A9270 NON-COVERED ITEM OR SERVICE: HCPCS

## 2022-12-03 PROCEDURE — 96376 TX/PRO/DX INJ SAME DRUG ADON: CPT

## 2022-12-03 PROCEDURE — 770001 HCHG ROOM/CARE - MED/SURG/GYN PRIV*

## 2022-12-03 PROCEDURE — 82962 GLUCOSE BLOOD TEST: CPT

## 2022-12-03 PROCEDURE — A9270 NON-COVERED ITEM OR SERVICE: HCPCS | Performed by: HOSPITALIST

## 2022-12-03 PROCEDURE — 93005 ELECTROCARDIOGRAM TRACING: CPT | Performed by: EMERGENCY MEDICINE

## 2022-12-03 PROCEDURE — 36415 COLL VENOUS BLD VENIPUNCTURE: CPT

## 2022-12-03 PROCEDURE — 0241U HCHG SARS-COV-2 COVID-19 NFCT DS RESP RNA 4 TRGT MIC: CPT

## 2022-12-03 PROCEDURE — 93005 ELECTROCARDIOGRAM TRACING: CPT

## 2022-12-03 PROCEDURE — 99223 1ST HOSP IP/OBS HIGH 75: CPT | Mod: AI | Performed by: HOSPITALIST

## 2022-12-03 RX ORDER — ACETAMINOPHEN 325 MG/1
650 TABLET ORAL EVERY 6 HOURS PRN
Status: DISCONTINUED | OUTPATIENT
Start: 2022-12-03 | End: 2022-12-06 | Stop reason: HOSPADM

## 2022-12-03 RX ORDER — ONDANSETRON 4 MG/1
4 TABLET, ORALLY DISINTEGRATING ORAL EVERY 4 HOURS PRN
Status: DISCONTINUED | OUTPATIENT
Start: 2022-12-03 | End: 2022-12-06 | Stop reason: HOSPADM

## 2022-12-03 RX ORDER — AMOXICILLIN 250 MG
2 CAPSULE ORAL 2 TIMES DAILY
Status: DISCONTINUED | OUTPATIENT
Start: 2022-12-03 | End: 2022-12-06 | Stop reason: HOSPADM

## 2022-12-03 RX ORDER — INSULIN LISPRO 100 [IU]/ML
2-9 INJECTION, SOLUTION INTRAVENOUS; SUBCUTANEOUS
Status: DISCONTINUED | OUTPATIENT
Start: 2022-12-03 | End: 2022-12-06 | Stop reason: HOSPADM

## 2022-12-03 RX ORDER — METOLAZONE 2.5 MG/1
2.5 TABLET ORAL
Status: DISCONTINUED | OUTPATIENT
Start: 2022-12-03 | End: 2022-12-06 | Stop reason: HOSPADM

## 2022-12-03 RX ORDER — POLYETHYLENE GLYCOL 3350 17 G/17G
1 POWDER, FOR SOLUTION ORAL
Status: DISCONTINUED | OUTPATIENT
Start: 2022-12-03 | End: 2022-12-06 | Stop reason: HOSPADM

## 2022-12-03 RX ORDER — TAMSULOSIN HYDROCHLORIDE 0.4 MG/1
0.4 CAPSULE ORAL
Status: DISCONTINUED | OUTPATIENT
Start: 2022-12-03 | End: 2022-12-03

## 2022-12-03 RX ORDER — FUROSEMIDE 10 MG/ML
40 INJECTION INTRAMUSCULAR; INTRAVENOUS
Status: DISCONTINUED | OUTPATIENT
Start: 2022-12-03 | End: 2022-12-04

## 2022-12-03 RX ORDER — ATORVASTATIN CALCIUM 40 MG/1
40 TABLET, FILM COATED ORAL EVERY EVENING
Status: DISCONTINUED | OUTPATIENT
Start: 2022-12-03 | End: 2022-12-06 | Stop reason: HOSPADM

## 2022-12-03 RX ORDER — SIMVASTATIN 40 MG
20 TABLET ORAL EVERY EVENING
Status: DISCONTINUED | OUTPATIENT
Start: 2022-12-03 | End: 2022-12-03

## 2022-12-03 RX ORDER — ONDANSETRON 2 MG/ML
4 INJECTION INTRAMUSCULAR; INTRAVENOUS EVERY 4 HOURS PRN
Status: DISCONTINUED | OUTPATIENT
Start: 2022-12-03 | End: 2022-12-06 | Stop reason: HOSPADM

## 2022-12-03 RX ORDER — CARVEDILOL 25 MG/1
25 TABLET ORAL 2 TIMES DAILY WITH MEALS
Status: DISCONTINUED | OUTPATIENT
Start: 2022-12-03 | End: 2022-12-06 | Stop reason: HOSPADM

## 2022-12-03 RX ORDER — BISACODYL 10 MG
10 SUPPOSITORY, RECTAL RECTAL
Status: DISCONTINUED | OUTPATIENT
Start: 2022-12-03 | End: 2022-12-06 | Stop reason: HOSPADM

## 2022-12-03 RX ORDER — ENOXAPARIN SODIUM 100 MG/ML
40 INJECTION SUBCUTANEOUS DAILY
Status: DISCONTINUED | OUTPATIENT
Start: 2022-12-03 | End: 2022-12-06 | Stop reason: HOSPADM

## 2022-12-03 RX ORDER — IPRATROPIUM BROMIDE AND ALBUTEROL SULFATE 2.5; .5 MG/3ML; MG/3ML
9 SOLUTION RESPIRATORY (INHALATION) ONCE
Status: COMPLETED | OUTPATIENT
Start: 2022-12-03 | End: 2022-12-03

## 2022-12-03 RX ORDER — CYCLOBENZAPRINE HCL 10 MG
10 TABLET ORAL 3 TIMES DAILY PRN
Status: DISCONTINUED | OUTPATIENT
Start: 2022-12-03 | End: 2022-12-06 | Stop reason: HOSPADM

## 2022-12-03 RX ORDER — GABAPENTIN 300 MG/1
600 CAPSULE ORAL
Status: DISCONTINUED | OUTPATIENT
Start: 2022-12-03 | End: 2022-12-06 | Stop reason: HOSPADM

## 2022-12-03 RX ORDER — INSULIN LISPRO 100 [IU]/ML
0.2 INJECTION, SOLUTION INTRAVENOUS; SUBCUTANEOUS
Status: DISCONTINUED | OUTPATIENT
Start: 2022-12-03 | End: 2022-12-03

## 2022-12-03 RX ORDER — LOSARTAN POTASSIUM 50 MG/1
50 TABLET ORAL 2 TIMES DAILY
Status: DISCONTINUED | OUTPATIENT
Start: 2022-12-03 | End: 2022-12-06 | Stop reason: HOSPADM

## 2022-12-03 RX ORDER — TRAMADOL HYDROCHLORIDE 50 MG/1
50 TABLET ORAL EVERY 6 HOURS PRN
Status: DISCONTINUED | OUTPATIENT
Start: 2022-12-03 | End: 2022-12-06 | Stop reason: HOSPADM

## 2022-12-03 RX ORDER — FAMOTIDINE 20 MG/1
20 TABLET, FILM COATED ORAL 2 TIMES DAILY
Status: DISCONTINUED | OUTPATIENT
Start: 2022-12-03 | End: 2022-12-06 | Stop reason: HOSPADM

## 2022-12-03 RX ORDER — DAPAGLIFLOZIN 10 MG/1
10 TABLET, FILM COATED ORAL DAILY
Status: DISCONTINUED | OUTPATIENT
Start: 2022-12-03 | End: 2022-12-06 | Stop reason: HOSPADM

## 2022-12-03 RX ORDER — FUROSEMIDE 10 MG/ML
20 INJECTION INTRAMUSCULAR; INTRAVENOUS ONCE
Status: COMPLETED | OUTPATIENT
Start: 2022-12-03 | End: 2022-12-03

## 2022-12-03 RX ORDER — LABETALOL HYDROCHLORIDE 5 MG/ML
10 INJECTION, SOLUTION INTRAVENOUS EVERY 4 HOURS PRN
Status: DISCONTINUED | OUTPATIENT
Start: 2022-12-03 | End: 2022-12-06 | Stop reason: HOSPADM

## 2022-12-03 RX ADMIN — GABAPENTIN 600 MG: 300 CAPSULE ORAL at 20:32

## 2022-12-03 RX ADMIN — FAMOTIDINE 20 MG: 20 TABLET, FILM COATED ORAL at 18:12

## 2022-12-03 RX ADMIN — CYCLOBENZAPRINE 10 MG: 10 TABLET, FILM COATED ORAL at 20:31

## 2022-12-03 RX ADMIN — LOSARTAN POTASSIUM 50 MG: 50 TABLET, FILM COATED ORAL at 12:17

## 2022-12-03 RX ADMIN — TIOTROPIUM BROMIDE INHALATION SPRAY 5 MCG: 3.12 SPRAY, METERED RESPIRATORY (INHALATION) at 12:18

## 2022-12-03 RX ADMIN — DOCUSATE SODIUM 50 MG AND SENNOSIDES 8.6 MG 2 TABLET: 8.6; 5 TABLET, FILM COATED ORAL at 18:13

## 2022-12-03 RX ADMIN — METOLAZONE 2.5 MG: 2.5 TABLET ORAL at 12:17

## 2022-12-03 RX ADMIN — LOSARTAN POTASSIUM 50 MG: 50 TABLET, FILM COATED ORAL at 18:12

## 2022-12-03 RX ADMIN — INSULIN LISPRO 2 UNITS: 100 INJECTION, SOLUTION INTRAVENOUS; SUBCUTANEOUS at 20:36

## 2022-12-03 RX ADMIN — FUROSEMIDE 20 MG: 10 INJECTION, SOLUTION INTRAMUSCULAR; INTRAVENOUS at 08:54

## 2022-12-03 RX ADMIN — CARVEDILOL 25 MG: 25 TABLET, FILM COATED ORAL at 12:48

## 2022-12-03 RX ADMIN — TRAMADOL HYDROCHLORIDE 50 MG: 50 TABLET, COATED ORAL at 19:33

## 2022-12-03 RX ADMIN — ASPIRIN 81 MG: 81 TABLET, COATED ORAL at 12:17

## 2022-12-03 RX ADMIN — FUROSEMIDE 40 MG: 10 INJECTION, SOLUTION INTRAMUSCULAR; INTRAVENOUS at 12:17

## 2022-12-03 RX ADMIN — CARVEDILOL 25 MG: 25 TABLET, FILM COATED ORAL at 18:12

## 2022-12-03 RX ADMIN — ATORVASTATIN CALCIUM 40 MG: 40 TABLET, FILM COATED ORAL at 18:11

## 2022-12-03 RX ADMIN — ENOXAPARIN SODIUM 40 MG: 40 INJECTION SUBCUTANEOUS at 18:13

## 2022-12-03 RX ADMIN — INSULIN LISPRO 5 UNITS: 100 INJECTION, SOLUTION INTRAVENOUS; SUBCUTANEOUS at 18:14

## 2022-12-03 RX ADMIN — INSULIN LISPRO 3 UNITS: 100 INJECTION, SOLUTION INTRAVENOUS; SUBCUTANEOUS at 12:53

## 2022-12-03 RX ADMIN — INSULIN GLARGINE-YFGN 11 UNITS: 100 INJECTION, SOLUTION SUBCUTANEOUS at 18:00

## 2022-12-03 RX ADMIN — IPRATROPIUM BROMIDE AND ALBUTEROL SULFATE 9 ML: .5; 2.5 SOLUTION RESPIRATORY (INHALATION) at 08:37

## 2022-12-03 ASSESSMENT — COPD QUESTIONNAIRES
DO YOU EVER COUGH UP ANY MUCUS OR PHLEGM?: YES, A FEW DAYS A WEEK OR MONTH
COPD SCREENING SCORE: 6
HAVE YOU SMOKED AT LEAST 100 CIGARETTES IN YOUR ENTIRE LIFE: YES
DURING THE PAST 4 WEEKS HOW MUCH DID YOU FEEL SHORT OF BREATH: SOME OF THE TIME

## 2022-12-03 ASSESSMENT — ENCOUNTER SYMPTOMS
CHILLS: 0
DOUBLE VISION: 0
DIZZINESS: 0
HEADACHES: 0
SORE THROAT: 0
PND: 1
SHORTNESS OF BREATH: 1
VOMITING: 0
NAUSEA: 0
ABDOMINAL PAIN: 0
PALPITATIONS: 0
DIARRHEA: 0
SPUTUM PRODUCTION: 1
BLURRED VISION: 0
COUGH: 1
ORTHOPNEA: 1
FEVER: 0
BACK PAIN: 0
LOSS OF CONSCIOUSNESS: 0

## 2022-12-03 ASSESSMENT — COGNITIVE AND FUNCTIONAL STATUS - GENERAL
SUGGESTED CMS G CODE MODIFIER DAILY ACTIVITY: CH
MOBILITY SCORE: 24
DAILY ACTIVITIY SCORE: 24
SUGGESTED CMS G CODE MODIFIER MOBILITY: CH

## 2022-12-03 ASSESSMENT — LIFESTYLE VARIABLES
HAVE YOU EVER FELT YOU SHOULD CUT DOWN ON YOUR DRINKING: NO
AVERAGE NUMBER OF DAYS PER WEEK YOU HAVE A DRINK CONTAINING ALCOHOL: 0
HOW MANY TIMES IN THE PAST YEAR HAVE YOU HAD 5 OR MORE DRINKS IN A DAY: 0
TOTAL SCORE: 0
HAVE PEOPLE ANNOYED YOU BY CRITICIZING YOUR DRINKING: NO
EVER FELT BAD OR GUILTY ABOUT YOUR DRINKING: NO
TOTAL SCORE: 0
TOTAL SCORE: 0
CONSUMPTION TOTAL: NEGATIVE
DOES PATIENT WANT TO STOP DRINKING: NO
ON A TYPICAL DAY WHEN YOU DRINK ALCOHOL HOW MANY DRINKS DO YOU HAVE: 1
EVER HAD A DRINK FIRST THING IN THE MORNING TO STEADY YOUR NERVES TO GET RID OF A HANGOVER: NO
ALCOHOL_USE: YES

## 2022-12-03 ASSESSMENT — PAIN DESCRIPTION - PAIN TYPE
TYPE: CHRONIC PAIN
TYPE: CHRONIC PAIN

## 2022-12-03 ASSESSMENT — PATIENT HEALTH QUESTIONNAIRE - PHQ9
2. FEELING DOWN, DEPRESSED, IRRITABLE, OR HOPELESS: NOT AT ALL
1. LITTLE INTEREST OR PLEASURE IN DOING THINGS: NOT AT ALL
SUM OF ALL RESPONSES TO PHQ9 QUESTIONS 1 AND 2: 0

## 2022-12-03 ASSESSMENT — FIBROSIS 4 INDEX
FIB4 SCORE: 1.73
FIB4 SCORE: 1.2
FIB4 SCORE: 1.2

## 2022-12-03 NOTE — ED NOTES
Pt resting in bed with unlabored breathing. No signs of distress at this time. Call light within reach.

## 2022-12-03 NOTE — ED NOTES
Pharmacy Medication Reconciliation    ~Medication Reconciliation partially completed per patient at bedside  ~Allergies reviewed and updated  ~Patient home pharmacy :  [facility]    ~Patient is unsure of current medications and patient home pharmacy is closed today.     ~Patient reports she took her antibiotic and Prednisone yesterday

## 2022-12-03 NOTE — ASSESSMENT & PLAN NOTE
Patient does not have any recent echoes in our system however she had cardiac catheterization in 2008 which documented an ejection fraction of 24%  Etiology and review of records appears to be uncertain.  Patient does have a history of hypertension, as well as amphetamine abuse either of these could be the etiology  She presents in what appears to be an acute exacerbation.  Check echo  Continue ACE inhibitor  IV Lasix and metolazone  Follow daily BMP, urine output, and BNP

## 2022-12-03 NOTE — H&P
Hospital Medicine History & Physical Note    Date of Service  12/3/2022    Primary Care Physician  Willis Gomez M.D.    Consultants      Specialist Names:     Code Status  Full Code    Chief Complaint  Chief Complaint   Patient presents with    Shortness of Breath     Pt c/o shortness of breath that started this morning. Pt wears 3.5L NC at baseline. Per EMS, pt was on 6-7L on her home concentrator when they arrived.  Pt currently on 6L NC.  Pt has history of CHF and states she is compliant with medications.     Hypertension     Pt given 1.6mg nitro by EMS for hypertension and pt's CHF.        History of Presenting Illness  Mckenna Sewell is a 67 y.o. female who presented 12/3/2022 with a previous medical history that includes hypertension, COPD, chronic hypoxic respiratory failure maintained on 3 L of nasal cannula oxygen, CHF, tobacco abuse, presence of ICD, type 2 diabetes, methamphetamine abuse, coronary artery disease.    Patient presents with complaint of shortness of breath which has been going on for 1 to 2 weeks.  She states her symptoms are worse when she exerts herself, and they also are worse when she lay down at night, and in fact they wake her up in the middle of the night.  She notes an increased cough which is intermittently productive.  She has not had any fever or chills.  Patient also denies any chest pain, or unusual back neck jaw or shoulder pain.  She was seen for these symptoms on November 30 here in the emergency room, and was felt to be suffering from a COPD exacerbation.  She was discharged home on systemic steroids, and increased bronchodilators.  She reports these interventions have not helped.    Today the symptoms were bad enough that she called EMS.  They reported that she was low 80s on her baseline 3 L nasal cannula, and required 6 to 7 L to get her up into the low 90s.  She was also hypertensive on scene with systolic blood pressures in the 180s.  She was given 1 dose of nitro  by EMS for presumed heart failure.  Patient did not have complaint of chest pain.  Here in the ED she has received an IV dose of Lasix, and overall states she is feeling better though still not back to her normal.    I discussed the plan of care with patient.    Review of Systems  Review of Systems   Constitutional:  Negative for chills and fever.   HENT:  Negative for nosebleeds and sore throat.    Eyes:  Negative for blurred vision and double vision.   Respiratory:  Positive for cough, sputum production and shortness of breath.    Cardiovascular:  Positive for orthopnea, leg swelling and PND. Negative for chest pain and palpitations.   Gastrointestinal:  Negative for abdominal pain, diarrhea, nausea and vomiting.   Genitourinary:  Negative for dysuria and urgency.   Musculoskeletal:  Negative for back pain.   Skin:  Negative for rash.   Neurological:  Negative for dizziness, loss of consciousness and headaches.     Past Medical History   has a past medical history of MAXIMILIANO (acute kidney injury) ([facility]) (5/15/2022), Benign essential hypertension, CAD (coronary artery disease), CHF (congestive heart failure) ([facility]), Congestive heart failure ([facility]), COPD, COPD (chronic obstructive pulmonary disease) ([facility]), Dilated cardiomyopathy ([facility]), History of cardiac catheterization, Hypertension, Mixed hyperlipidemia, Nicotine dependence, Nicotine dependence, and Type 2 diabetes mellitus with complication ([facility]).    Surgical History   has a past surgical history that includes other cardiac surgery; appendectomy; and pr open fix inter/subtroch fx,implnt (Right, 7/6/2022).     Family History  family history includes Heart Attack in her maternal aunt; Heart Disease in her father, maternal aunt, maternal uncle, and mother.   Family history reviewed with patient. There is no family history that is pertinent to the chief complaint.     Social History   reports that she has been smoking cigarettes. She has a 20.00 pack-year smoking history.  She has never used smokeless tobacco. She reports that she does not drink alcohol and does not use drugs.    Allergies  Allergies   Allergen Reactions    Advair Diskus      Rash/hives    Codeine     Codeine     Fluticasone-Salmeterol     Lisinopril     Sulfa Drugs        Medications  Prior to Admission Medications   Prescriptions Last Dose Informant Patient Reported? Taking?   Empagliflozin (JARDIANCE) 25 MG Tab  Patient Yes No   Sig: Take 1 Tablet by mouth every day.   Ipratropium-Albuterol (COMBIVENT INH)  Patient Yes No   Sig: Inhale  by mouth.   albuterol (VENTOLIN OR PROVENTIL) 108 (90 BASE) MCG/ACT Aero Soln inhalation aerosol  Patient Yes No   Sig: Inhale 2 Puffs by mouth every 6 hours as needed for Shortness of Breath.   aspirin EC (ECOTRIN) 81 MG TBEC  Patient Yes No   Sig: Take 81 mg by mouth every day.     atorvastatin (LIPITOR) 10 MG Tab   Yes No   Si tablet to control cholesterol Orally Once a day for 90 days   azithromycin (ZITHROMAX) 250 MG Tab   No No   Sig: Take 2 tablets (500 mg) PO on day 1 and then take 1 tablet (250 mg) daily on 2-5   carvedilol (COREG) 25 MG Tab  Patient No No   Sig: TAKE ONE TABLET BY MOUTH TWICE DAILY WITH MEALS   cyclobenzaprine (FLEXERIL) 10 mg Tab  Patient Yes No   Sig: Take 10 mg by mouth at bedtime.   cyclobenzaprine (FLEXERIL) 10 mg Tab   No No   Sig: Take 1 Tablet by mouth 3 times a day as needed for Muscle Spasms or Moderate Pain.   docusate sodium 100 MG Cap   No No   Sig: Take 100 mg by mouth 2 times a day.   famotidine (PEPCID) 20 MG Tab  Patient Yes No   Sig: Take 20 mg by mouth in the morning and 20 mg in the evening.   gabapentin (NEURONTIN) 300 MG Cap  Patient Yes No   Sig: Take 600-900 mg by mouth at bedtime.   insulin aspart (NOVOLOG FLEXPEN) 100 UNIT/ML injection PEN  Patient Yes No   Sig: Inject  under the skin 2 times a day. PER SLIDING SCALE:  2 units for level 150+, then additional 2 units for every 50 above.   linagliptin (TRADJENTA) 5 MG Tab tablet   Patient Yes No   Sig: Take 5 mg by mouth every day.   losartan (COZAAR) 50 MG Tab  Patient No No   Sig: Take 1 Tab by mouth 2 Times a Day.   nicotine (NICODERM) 14 MG/24HR PATCH 24 HR  Patient Yes No   Sig: Place 1 Patch on the skin every 24 hours.   polyethylene glycol/lytes (MIRALAX) 17 g Pack   No No   Sig: Take 1 Packet by mouth 2 times a day as needed (if sennosides and/or docusate ineffective or not ordered).   predniSONE (DELTASONE) 20 MG Tab   No No   Sig: Take 2 Tablets by mouth every day for 5 days.   simvastatin (ZOCOR) 20 MG Tab   No No   Sig: TAKE ONE TABLET BY MOUTH ONCE DAILY IN THE EVENING   Patient taking differently: Take 20 mg by mouth 2 times a day.   tamsulosin (FLOMAX) 0.4 MG capsule   No No   Sig: Take 1 Capsule by mouth 1/2 hour after breakfast.   tiotropium (SPIRIVA RESPIMAT) 2.5 mcg/Act Aero Soln  Patient Yes No   Sig: Inhale 5 mcg every day.   traMADol (ULTRAM) 50 MG Tab   Yes No   Si tablet as needed for right hip pain S72.001P start date 22 Orally 4 times each day a day for 10 days      Facility-Administered Medications: None       Physical Exam  Temp:  [36.1 °C (97 °F)] 36.1 °C (97 °F)  Pulse:  [79-85] 85  Resp:  [22-37] 24  BP: (169-183)/() 183/116  SpO2:  [90 %-95 %] 95 %  Blood Pressure : (!) 183/116   Temperature: 36.1 °C (97 °F)   Pulse: 85   Respiration: (!) 24   Pulse Oximetry: 95 %       Physical Exam  Constitutional:       General: She is not in acute distress.     Appearance: Normal appearance. She is well-developed. She is not diaphoretic.   HENT:      Head: Normocephalic and atraumatic.   Neck:      Vascular: JVD present.      Comments: JVD to the mastoid  Cardiovascular:      Rate and Rhythm: Normal rate and regular rhythm.      Heart sounds: Murmur heard.   Pulmonary:      Effort: Pulmonary effort is normal. No respiratory distress.      Breath sounds: No stridor. Rales present. No wheezing.   Abdominal:      Palpations: Abdomen is soft.      Tenderness:  There is no abdominal tenderness. There is no guarding or rebound.   Musculoskeletal:         General: No tenderness.      Right lower leg: Edema present.      Left lower leg: Edema present.      Comments: Trace edema in bilateral lower extremities up to the midshin   Skin:     General: Skin is warm and dry.      Capillary Refill: Capillary refill takes less than 2 seconds.      Findings: No rash.   Neurological:      Mental Status: She is alert and oriented to person, place, and time. Mental status is at baseline.   Psychiatric:         Mood and Affect: Mood normal.         Behavior: Behavior normal.         Thought Content: Thought content normal.       Laboratory:  Recent Labs     12/03/22  0707   WBC 9.3   RBC 3.58*   HEMOGLOBIN 8.7*   HEMATOCRIT 28.5*   MCV 79.6*   MCH 24.3*   MCHC 30.5*   RDW 46.5   PLATELETCT 299   MPV 9.2     Recent Labs     12/03/22  0707   SODIUM 135   POTASSIUM 3.7   CHLORIDE 102   CO2 22   GLUCOSE 272*   BUN 22   CREATININE 0.69   CALCIUM 8.6     Recent Labs     12/03/22  0707   ALTSGPT 40   ASTSGOT 33   ALKPHOSPHAT 102*   TBILIRUBIN 0.3   GLUCOSE 272*         Recent Labs     12/03/22  0707   NTPROBNP 4966*         Recent Labs     12/03/22  0707   TROPONINT 25*       Imaging:  DX-CHEST-PORTABLE (1 VIEW)   Final Result         1.  Interstitial pulmonary parenchymal prominence suggest chronic underlying lung disease, component of interstitial edema and/or infiltrates not excluded.   2.  Cardiomegaly   3.  Atherosclerosis      EC-ECHOCARDIOGRAM COMPLETE W/O CONT    (Results Pending)       X-Ray:  I have personally reviewed the images and compared with prior images.  EKG:  I have personally reviewed the images and compared with prior images.    Assessment/Plan:  Justification for Admission Status  I anticipate this patient will require at least two midnights for appropriate medical management, necessitating inpatient admission because patient has acute on chronic hypoxic respiratory failure  and has failed appropriate outpatient therapy.  She will require several days in the hospital    Patient will need a Med/Surg bed on MEDICAL service .  The need is secondary to acute on chronic hypoxic respiratory failure.    Acute on chronic respiratory failure with hypoxia (HCC)  Assessment & Plan  Patient has both COPD and CHF.    She was treated on the 30th for COPD exacerbation with systemic steroids and bronchodilators however she has not improved.  Clinically today she looks to be in heart failure with positive JVD, peripheral edema, chest x-ray which is difficult to interpret as there is some chronic findings but it would appear to indicate some volume overload  IV diuretics: Lasix and metolazone  Aim for negative fluid balance 500 to 1 L every 24 hours  Follow daily BMP and urine output  Check echo  Doubt PE based on overall clinical presentation however if she fails to improve would certainly consider PE study    Methamphetamine abuse (HCC)- (present on admission)  Assessment & Plan  Denies current use    Type 2 diabetes mellitus with complication (HCC)- (present on admission)  Assessment & Plan  Patient is maintained on Tradjenta 5 mg, sliding scale insulin twice daily, and empagliflozin 25 mg  Last A1c 5 months ago was 7.4 which would imply good control  Continue outpatient regimen and cover sliding scale    Biventricular ICD (implantable cardioverter-defibrillator) in place- (present on admission)  Assessment & Plan  Rhythm is paced today    Nicotine dependence- (present on admission)  Assessment & Plan  As needed replacement  Encourage cessation    Dilated cardiomyopathy (HCC)- (present on admission)  Assessment & Plan  Patient does not have any recent echoes in our system however she had cardiac catheterization in 2008 which documented an ejection fraction of 24%  Etiology and review of records appears to be uncertain.  Patient does have a history of hypertension, as well as amphetamine abuse either  of these could be the etiology  She presents in what appears to be an acute exacerbation.  Check echo  Continue ACE inhibitor  IV Lasix and metolazone  Follow daily BMP, urine output, and BNP      VTE prophylaxis: enoxaparin ppx

## 2022-12-03 NOTE — ED NOTES
Pt's purewick leaked. Pt cleaned up and new bedding placed. 750ml emptied from purewick cannister.

## 2022-12-03 NOTE — ASSESSMENT & PLAN NOTE
Patient has both COPD and CHF.    She was treated on the 30th for COPD exacerbation with systemic steroids and bronchodilators however she has not improved.  Clinically today she looks to be in heart failure with positive JVD, peripheral edema, chest x-ray which is difficult to interpret as there is some chronic findings but it would appear to indicate some volume overload  IV diuretics: Lasix and metolazone  Aim for negative fluid balance 500 to 1 L every 24 hours  Follow daily BMP and urine output  Check echo  Doubt PE based on overall clinical presentation however if she fails to improve would certainly consider PE study

## 2022-12-03 NOTE — ED NOTES
RN unavailable for report, PT to be transported to floor now and RN will call down for report shortly

## 2022-12-03 NOTE — ED PROVIDER NOTES
ED Provider Note    ED Provider Note    Primary care provider: Willis Gomez M.D.  Means of arrival: EMS  History obtained from: Patient    CHIEF COMPLAINT  Chief Complaint   Patient presents with    Shortness of Breath     Pt c/o shortness of breath that started this morning. Pt wears 3.5L NC at baseline. Per EMS, pt was on 6-7L on her home concentrator when they arrived.  Pt currently on 6L NC.  Pt has history of CHF and states she is compliant with medications.     Hypertension     Pt given 1.6mg nitro by EMS for hypertension and pt's CHF.      Seen at 7:37 AM.   HPI  Mckenna Sewell is a 67 y.o. female with history of COPD, on 3 L nasal cannula at baseline presents with several days of increasing cough, shortness of breath, generalized malaise, subjective fevers/chills.  Denies any chest pain except for when she coughs.  Denies any nausea, vomiting, diarrhea, melena, hematochezia.  Dyspnea refractory to home nebulizer treatments.  She is compliant with steroids and antibiotics as prescribed on her ER visit 11/30.  Reports history of an MI x2 and CHF.    Overall she lives alone, scared to go home as she has had increasing shortness of breath and difficulty taking care of her activities of daily living.    REVIEW OF SYSTEMS  See HPI,   Remainder of ROS negative.     PAST MEDICAL HISTORY   has a past medical history of MAXIMILIANO (acute kidney injury) (HCC) (5/15/2022), Benign essential hypertension, CAD (coronary artery disease), CHF (congestive heart failure) (HCC), Congestive heart failure (HCC), COPD, COPD (chronic obstructive pulmonary disease) (HCC), Dilated cardiomyopathy (HCC), History of cardiac catheterization, Hypertension, Mixed hyperlipidemia, Nicotine dependence, Nicotine dependence, and Type 2 diabetes mellitus with complication (HCC).    SURGICAL HISTORY   has a past surgical history that includes other cardiac surgery; appendectomy; and open fix inter/subtroch fx,implnt (Right, 7/6/2022).    SOCIAL  "HISTORY  Social History     Tobacco Use    Smoking status: Every Day     Packs/day: 0.50     Years: 40.00     Pack years: 20.00     Types: Cigarettes    Smokeless tobacco: Never    Tobacco comments:     1/2 ppd   Vaping Use    Vaping Use: Never used   Substance Use Topics    Alcohol use: No    Drug use: No      Social History     Substance and Sexual Activity   Drug Use No       FAMILY HISTORY  Family History   Problem Relation Age of Onset    Heart Disease Father     Heart Disease Mother     Heart Disease Maternal Aunt     Heart Attack Maternal Aunt     Heart Disease Maternal Uncle        CURRENT MEDICATIONS  Reviewed.  See Encounter Summary.     ALLERGIES  Allergies   Allergen Reactions    Advair Diskus      Rash/hives    Codeine     Codeine     Fluticasone-Salmeterol     Lisinopril     Sulfa Drugs        PHYSICAL EXAM  VITAL SIGNS: BP (!) 174/97   Pulse 82   Temp 36.1 °C (97 °F) (Temporal)   Resp (!) 22   Ht 1.626 m (5' 4\")   Wt 57.2 kg (126 lb)   SpO2 94%   BMI 21.63 kg/m²   Constitutional: Awake, alert in no apparent distress.  HENT: Normocephalic, Bilateral external ears normal. Nose normal.   Eyes: Conjunctiva normal, non-icteric, EOMI.    Thorax & Lungs: Prolonged expiratory phase, diminished throughout with faint wheezes.  Cardiovascular: Regular rate, Regular rhythm, No murmurs, rubs or gallops. Bilateral pulses symmetrical.   Abdomen:  Soft, nontender, nondistended, normal active bowel sounds.   :    Skin: Visualized skin is  Dry, No erythema, No rash.   Musculoskeletal:   No cyanosis, clubbing or edema. No leg asymmetry.   Neurologic: Alert, Grossly non-focal.   Psychiatric: Normal affect, Normal mood  Lymphatic:  No cervical LAD    EKG   12 lead Interpreted by me  Rhythm: Paced atrial rhythm, rate of 84, no acute ST changes.        RADIOLOGY  DX-CHEST-PORTABLE (1 VIEW)   Final Result         1.  Interstitial pulmonary parenchymal prominence suggest chronic underlying lung disease, component of " interstitial edema and/or infiltrates not excluded.   2.  Cardiomegaly   3.  Atherosclerosis            COURSE & MEDICAL DECISION MAKING  Pertinent Labs & Imaging studies reviewed. (See chart for details)    Differential diagnoses include but are not limited to: CHF, COPD, sepsis, COVID    7:37 AM - Medical record reviewed, history of COPD, oxygen dependent.  Was seen in our emergency department on 11/30 for shortness of breath and cough.  Treated for COPD exacerbation with azithromycin and prednisone.    9:45 AM: Patient with some improvement on nebulizer treatments as well as diuresis.  Case discussed with Dr. Munoz who will evaluate the patient for hospitalization.    Decision Making:  This is a pleasant 67 y.o. year old female who presents with steadily worsening shortness of breath, cough for the past few days, refractory to azithromycin and prednisone.  She does have increased oxygen requirement, no increased work of breathing.  She was treated for COPD and CHF here in the emergency department.  Labs are more suggestive of CHF with a proBNP over 4000.  No recent echoes in our system.  Given that she still has increased oxygen requirement I think is reasonable to hospitalize her for further diuresis, consider 2D echo.  Patient does have an anemia which is slightly more pronounced than it was a few days ago, this is possibly dilutional due to fluid overload.  Can trend as an inpatient.    Heart Score: Low          FINAL IMPRESSION  1. Dyspnea, unspecified type    2. Acute congestive heart failure, unspecified heart failure type (HCC)

## 2022-12-03 NOTE — ED TRIAGE NOTES
"Chief Complaint   Patient presents with    Shortness of Breath     Pt c/o shortness of breath that started this morning. Pt wears 3.5L NC at baseline. Per EMS, pt was on 6-7L on her home concentrator when they arrived.  Pt currently on 6L NC.  Pt has history of CHF and states she is compliant with medications.     Hypertension     Pt given 1.6mg nitro by EMS for hypertension and pt's CHF.      BP (!) 179/111   Pulse 79   Temp 36.1 °C (97 °F) (Temporal)   Resp (!) 22   Ht 1.626 m (5' 4\")   Wt 57.2 kg (126 lb)   SpO2 90%   BMI 21.63 kg/m²     Pt A&O x4.  Pt has some edema to lower extremities.  Pt also complaints of productive cough. Pt placed on monitor.  Shortness of breath protocol ordered.   "

## 2022-12-03 NOTE — ASSESSMENT & PLAN NOTE
Patient is maintained on Tradjenta 5 mg, sliding scale insulin twice daily, and empagliflozin 25 mg  Last A1c 5 months ago was 7.4  Continue outpatient regimen and cover sliding scale

## 2022-12-04 ENCOUNTER — APPOINTMENT (OUTPATIENT)
Dept: CARDIOLOGY | Facility: MEDICAL CENTER | Age: 67
DRG: 291 | End: 2022-12-04
Attending: HOSPITALIST
Payer: MEDICARE

## 2022-12-04 LAB
ANION GAP SERPL CALC-SCNC: 13 MMOL/L (ref 7–16)
BUN SERPL-MCNC: 18 MG/DL (ref 8–22)
CALCIUM SERPL-MCNC: 9 MG/DL (ref 8.5–10.5)
CHLORIDE SERPL-SCNC: 89 MMOL/L (ref 96–112)
CO2 SERPL-SCNC: 29 MMOL/L (ref 20–33)
CREAT SERPL-MCNC: 0.79 MG/DL (ref 0.5–1.4)
GFR SERPLBLD CREATININE-BSD FMLA CKD-EPI: 81 ML/MIN/1.73 M 2
GLUCOSE BLD STRIP.AUTO-MCNC: 141 MG/DL (ref 65–99)
GLUCOSE BLD STRIP.AUTO-MCNC: 254 MG/DL (ref 65–99)
GLUCOSE BLD STRIP.AUTO-MCNC: 294 MG/DL (ref 65–99)
GLUCOSE BLD STRIP.AUTO-MCNC: 294 MG/DL (ref 65–99)
GLUCOSE SERPL-MCNC: 307 MG/DL (ref 65–99)
LV EJECT FRACT  99904: 35
LV EJECT FRACT MOD 2C 99903: 56.49
LV EJECT FRACT MOD 4C 99902: 35.36
LV EJECT FRACT MOD BP 99901: 49.05
NT-PROBNP SERPL IA-MCNC: 6539 PG/ML (ref 0–125)
POTASSIUM SERPL-SCNC: 2.6 MMOL/L (ref 3.6–5.5)
SODIUM SERPL-SCNC: 131 MMOL/L (ref 135–145)

## 2022-12-04 PROCEDURE — 99233 SBSQ HOSP IP/OBS HIGH 50: CPT | Performed by: NURSE PRACTITIONER

## 2022-12-04 PROCEDURE — 83880 ASSAY OF NATRIURETIC PEPTIDE: CPT

## 2022-12-04 PROCEDURE — 700102 HCHG RX REV CODE 250 W/ 637 OVERRIDE(OP)

## 2022-12-04 PROCEDURE — 700111 HCHG RX REV CODE 636 W/ 250 OVERRIDE (IP): Performed by: NURSE PRACTITIONER

## 2022-12-04 PROCEDURE — 82962 GLUCOSE BLOOD TEST: CPT | Mod: 91

## 2022-12-04 PROCEDURE — 94640 AIRWAY INHALATION TREATMENT: CPT

## 2022-12-04 PROCEDURE — 36415 COLL VENOUS BLD VENIPUNCTURE: CPT

## 2022-12-04 PROCEDURE — 80048 BASIC METABOLIC PNL TOTAL CA: CPT

## 2022-12-04 PROCEDURE — 700102 HCHG RX REV CODE 250 W/ 637 OVERRIDE(OP): Performed by: HOSPITALIST

## 2022-12-04 PROCEDURE — A9270 NON-COVERED ITEM OR SERVICE: HCPCS

## 2022-12-04 PROCEDURE — 93306 TTE W/DOPPLER COMPLETE: CPT | Mod: 26 | Performed by: INTERNAL MEDICINE

## 2022-12-04 PROCEDURE — 700111 HCHG RX REV CODE 636 W/ 250 OVERRIDE (IP): Performed by: HOSPITALIST

## 2022-12-04 PROCEDURE — 700102 HCHG RX REV CODE 250 W/ 637 OVERRIDE(OP): Performed by: NURSE PRACTITIONER

## 2022-12-04 PROCEDURE — A9270 NON-COVERED ITEM OR SERVICE: HCPCS | Performed by: NURSE PRACTITIONER

## 2022-12-04 PROCEDURE — A9270 NON-COVERED ITEM OR SERVICE: HCPCS | Performed by: HOSPITALIST

## 2022-12-04 PROCEDURE — 93306 TTE W/DOPPLER COMPLETE: CPT

## 2022-12-04 PROCEDURE — 770001 HCHG ROOM/CARE - MED/SURG/GYN PRIV*

## 2022-12-04 RX ORDER — BUDESONIDE AND FORMOTEROL FUMARATE DIHYDRATE 160; 4.5 UG/1; UG/1
2 AEROSOL RESPIRATORY (INHALATION)
Status: DISCONTINUED | OUTPATIENT
Start: 2022-12-04 | End: 2022-12-06 | Stop reason: HOSPADM

## 2022-12-04 RX ORDER — POTASSIUM CHLORIDE 20 MEQ/1
40 TABLET, EXTENDED RELEASE ORAL ONCE
Status: COMPLETED | OUTPATIENT
Start: 2022-12-04 | End: 2022-12-04

## 2022-12-04 RX ORDER — FUROSEMIDE 10 MG/ML
40 INJECTION INTRAMUSCULAR; INTRAVENOUS
Status: DISCONTINUED | OUTPATIENT
Start: 2022-12-04 | End: 2022-12-05

## 2022-12-04 RX ORDER — POTASSIUM CHLORIDE 7.45 MG/ML
10 INJECTION INTRAVENOUS
Status: DISPENSED | OUTPATIENT
Start: 2022-12-04 | End: 2022-12-04

## 2022-12-04 RX ADMIN — BUDESONIDE AND FORMOTEROL FUMARATE DIHYDRATE 2 PUFF: 160; 4.5 AEROSOL RESPIRATORY (INHALATION) at 11:56

## 2022-12-04 RX ADMIN — CARVEDILOL 25 MG: 25 TABLET, FILM COATED ORAL at 16:56

## 2022-12-04 RX ADMIN — METOLAZONE 2.5 MG: 2.5 TABLET ORAL at 06:40

## 2022-12-04 RX ADMIN — BUDESONIDE AND FORMOTEROL FUMARATE DIHYDRATE 2 PUFF: 160; 4.5 AEROSOL RESPIRATORY (INHALATION) at 20:44

## 2022-12-04 RX ADMIN — ATORVASTATIN CALCIUM 40 MG: 40 TABLET, FILM COATED ORAL at 16:55

## 2022-12-04 RX ADMIN — POTASSIUM CHLORIDE 10 MEQ: 10 INJECTION, SOLUTION INTRAVENOUS at 11:24

## 2022-12-04 RX ADMIN — INSULIN GLARGINE-YFGN 11 UNITS: 100 INJECTION, SOLUTION SUBCUTANEOUS at 16:50

## 2022-12-04 RX ADMIN — DOCUSATE SODIUM 50 MG AND SENNOSIDES 8.6 MG 2 TABLET: 8.6; 5 TABLET, FILM COATED ORAL at 06:40

## 2022-12-04 RX ADMIN — INSULIN LISPRO 5 UNITS: 100 INJECTION, SOLUTION INTRAVENOUS; SUBCUTANEOUS at 21:34

## 2022-12-04 RX ADMIN — ENOXAPARIN SODIUM 40 MG: 40 INJECTION SUBCUTANEOUS at 16:55

## 2022-12-04 RX ADMIN — DOCUSATE SODIUM 50 MG AND SENNOSIDES 8.6 MG 2 TABLET: 8.6; 5 TABLET, FILM COATED ORAL at 16:55

## 2022-12-04 RX ADMIN — GABAPENTIN 600 MG: 300 CAPSULE ORAL at 21:33

## 2022-12-04 RX ADMIN — DAPAGLIFLOZIN 10 MG: 10 TABLET, FILM COATED ORAL at 06:41

## 2022-12-04 RX ADMIN — FAMOTIDINE 20 MG: 20 TABLET, FILM COATED ORAL at 06:40

## 2022-12-04 RX ADMIN — TRAMADOL HYDROCHLORIDE 50 MG: 50 TABLET, COATED ORAL at 16:10

## 2022-12-04 RX ADMIN — FUROSEMIDE 40 MG: 10 INJECTION, SOLUTION INTRAMUSCULAR; INTRAVENOUS at 06:39

## 2022-12-04 RX ADMIN — INSULIN LISPRO 5 UNITS: 100 INJECTION, SOLUTION INTRAVENOUS; SUBCUTANEOUS at 11:27

## 2022-12-04 RX ADMIN — INSULIN LISPRO 5 UNITS: 100 INJECTION, SOLUTION INTRAVENOUS; SUBCUTANEOUS at 16:49

## 2022-12-04 RX ADMIN — CYCLOBENZAPRINE 10 MG: 10 TABLET, FILM COATED ORAL at 21:33

## 2022-12-04 RX ADMIN — POTASSIUM CHLORIDE 10 MEQ: 10 INJECTION, SOLUTION INTRAVENOUS at 13:10

## 2022-12-04 RX ADMIN — POTASSIUM CHLORIDE 40 MEQ: 1500 TABLET, EXTENDED RELEASE ORAL at 11:13

## 2022-12-04 RX ADMIN — LOSARTAN POTASSIUM 50 MG: 50 TABLET, FILM COATED ORAL at 16:55

## 2022-12-04 RX ADMIN — FUROSEMIDE 40 MG: 10 INJECTION, SOLUTION INTRAMUSCULAR; INTRAVENOUS at 16:11

## 2022-12-04 RX ADMIN — POTASSIUM CHLORIDE 10 MEQ: 10 INJECTION, SOLUTION INTRAVENOUS at 14:41

## 2022-12-04 RX ADMIN — ASPIRIN 81 MG: 81 TABLET, COATED ORAL at 06:40

## 2022-12-04 RX ADMIN — TRAMADOL HYDROCHLORIDE 50 MG: 50 TABLET, COATED ORAL at 23:07

## 2022-12-04 RX ADMIN — FAMOTIDINE 20 MG: 20 TABLET, FILM COATED ORAL at 16:55

## 2022-12-04 RX ADMIN — LOSARTAN POTASSIUM 50 MG: 50 TABLET, FILM COATED ORAL at 06:40

## 2022-12-04 RX ADMIN — CARVEDILOL 25 MG: 25 TABLET, FILM COATED ORAL at 06:40

## 2022-12-04 ASSESSMENT — ENCOUNTER SYMPTOMS
SHORTNESS OF BREATH: 1
GASTROINTESTINAL NEGATIVE: 1
NEUROLOGICAL NEGATIVE: 1
MUSCULOSKELETAL NEGATIVE: 1
PND: 1
ORTHOPNEA: 1
EYES NEGATIVE: 1
COUGH: 1
CONSTITUTIONAL NEGATIVE: 1
PSYCHIATRIC NEGATIVE: 1

## 2022-12-04 ASSESSMENT — PAIN DESCRIPTION - PAIN TYPE: TYPE: CHRONIC PAIN

## 2022-12-04 NOTE — PROGRESS NOTES
[facility] Medicine Daily Progress Note    Date of Service  12/4/2022    Chief Complaint  Mckenna Sewell is a 67 y.o. female admitted 12/3/2022 with shortness of breath x2 weeks.    Date of admission symptoms abdominal difficulty [facility] reported that her oxygen was in the low 90s on 2 L nasal cannula and titrated up to 6-7 to get her into the 90s.  She was hypotensive on scene with systolic blood pressures in the 180s denied chest pain discomfort.  Received IV Lasix in the ED.    Past medical history significant for hypertension, COPD, chronic hypoxemic respiratory failure maintained on 3 L nasal cannula, CHF, echo use, ICD, type 2 diabetes, methamphetamine use and coronary artery disease    Hospital Course  She was given 2 rounds of IV Lasix with improvement in dyspnea/shortness of breath.  Oxygen requirements have diminished she is back down on 3 L.     Interval Problem Update  Continue diuresis for 1 -2 more doses of Lasix  Potassium 2.6 - 10mEq Kcl x4 ordered with 40 mEq PO    I have discussed this patient's plan of care and discharge plan at IDT rounds today with Case Management, Nursing, Nursing leadership, and other members of the IDT team.    Consultants/Specialty  none    Code Status  Full Code    Disposition  Patient is not medically cleared for discharge.   Anticipate discharge to to home with close outpatient follow-up.  I have placed the appropriate orders for post-discharge needs.    Review of Systems  Review of Systems   Constitutional: Negative.    HENT: Negative.     Eyes: Negative.    Respiratory:  Positive for cough and shortness of breath.    Cardiovascular:  Positive for orthopnea, leg swelling and PND.   Gastrointestinal: Negative.    Genitourinary: Negative.    Musculoskeletal: Negative.    Skin: Negative.    Neurological: Negative.    Endo/Heme/Allergies: Negative.    Psychiatric/Behavioral: Negative.        Physical Exam  Temp:  [36.2 °C (97.2 °F)-37.2 °C (98.9 °F)] 36.4 °C (97.5  °F)  Pulse:  [74-91] 78  Resp:  [18-23] 18  BP: (102-189)/() 102/62  SpO2:  [93 %-100 %] 100 %    Physical Exam  Vitals and nursing note reviewed.   Constitutional:       Appearance: Normal appearance.   HENT:      Head: Normocephalic and atraumatic.      Mouth/Throat:      Mouth: Mucous membranes are moist.   Eyes:      Extraocular Movements: Extraocular movements intact.      Conjunctiva/sclera: Conjunctivae normal.      Pupils: Pupils are equal, round, and reactive to light.   Cardiovascular:      Rate and Rhythm: Normal rate and regular rhythm.      Pulses: Normal pulses.      Heart sounds: Normal heart sounds. No murmur heard.  Pulmonary:      Breath sounds: Normal breath sounds. No wheezing, rhonchi or rales.      Comments: Mildly decreased air movement  Worse bilateral upper lobes  Faint scattered rales bilateral lower bases  Abdominal:      General: Abdomen is flat. Bowel sounds are normal.      Palpations: Abdomen is soft.      Tenderness: There is no abdominal tenderness. There is no guarding or rebound.   Musculoskeletal:         General: Normal range of motion.      Cervical back: Normal range of motion.      Right lower leg: No edema.      Left lower leg: No edema.   Skin:     General: Skin is warm and dry.   Neurological:      General: No focal deficit present.      Mental Status: She is alert and oriented to person, place, and time.   Psychiatric:         Mood and Affect: Mood normal.         Thought Content: Thought content normal.       Fluids    Intake/Output Summary (Last 24 hours) at 12/4/2022 1035  Last data filed at 12/4/2022 0835  Gross per 24 hour   Intake 898 ml   Output 3200 ml   Net -2302 ml       Laboratory  Recent Labs     12/03/22  0707   WBC 9.3   RBC 3.58*   HEMOGLOBIN 8.7*   HEMATOCRIT 28.5*   MCV 79.6*   MCH 24.3*   MCHC 30.5*   RDW 46.5   PLATELETCT 299   MPV 9.2     Recent Labs     12/03/22  0707   SODIUM 135   POTASSIUM 3.7   CHLORIDE 102   CO2 22   GLUCOSE 272*   BUN 22    CREATININE 0.69   CALCIUM 8.6                   Imaging  EC-ECHOCARDIOGRAM COMPLETE W/O CONT         DX-CHEST-PORTABLE (1 VIEW)   Final Result         1.  Interstitial pulmonary parenchymal prominence suggest chronic underlying lung disease, component of interstitial edema and/or infiltrates not excluded.   2.  Cardiomegaly   3.  Atherosclerosis           Assessment/Plan  Acute on chronic respiratory failure with hypoxia (HCC)  Assessment & Plan  Patient has both COPD and CHF.    She was treated on the 30th for COPD exacerbation with systemic steroids and bronchodilators however she has not improved.  Clinically today she looks to be in heart failure with positive JVD, peripheral edema, chest x-ray which is difficult to interpret as there is some chronic findings but it would appear to indicate some volume overload  IV diuretics: Lasix and metolazone  Aim for negative fluid balance 500 to 1 L every 24 hours  Follow daily BMP and urine output  Check echo  Doubt PE based on overall clinical presentation however if she fails to improve would certainly consider PE study    Methamphetamine abuse (HCC)- (present on admission)  Assessment & Plan  Denies current use    Type 2 diabetes mellitus with complication (HCC)- (present on admission)  Assessment & Plan  Patient is maintained on Tradjenta 5 mg, sliding scale insulin twice daily, and empagliflozin 25 mg  Last A1c 5 months ago was 7.4 which would imply good control  Continue outpatient regimen and cover sliding scale    Biventricular ICD (implantable cardioverter-defibrillator) in place- (present on admission)  Assessment & Plan  Rhythm is paced today    Nicotine dependence- (present on admission)  Assessment & Plan  As needed replacement  Encourage cessation    Dilated cardiomyopathy (HCC)- (present on admission)  Assessment & Plan  Patient does not have any recent echoes in our system however she had cardiac catheterization in 2008 which documented an ejection  fraction of 24%  Etiology and review of records appears to be uncertain.  Patient does have a history of hypertension, as well as amphetamine abuse either of these could be the etiology  She presents in what appears to be an acute exacerbation.  Check echo  Continue ACE inhibitor  IV Lasix and metolazone  Follow daily BMP, urine output, and BNP       VTE prophylaxis: enoxaparin ppx    I have performed a physical exam and reviewed and updated ROS and Plan today (12/4/2022). In review of yesterday's note (12/3/2022), there are no changes except as documented above.

## 2022-12-04 NOTE — PROGRESS NOTES
Assumed care of patient and received report.  Patient is oriented x 4 and reports pain level of 4/10 relieved with tramadol and flexeril.  Patient is ambulatory with standby assist, low fall risk, and on 3 L of O2 via nasal cannula.  No additional needs at this time.

## 2022-12-04 NOTE — WOUND TEAM
Wound team consulted for patient's sacrum, per RN patient sacrum is pink and blanching.  Wound team is signing off. Please re-consult for any skin issues.

## 2022-12-04 NOTE — RESPIRATORY CARE
"COPD EDUCATION by COPD CLINICAL EDUCATOR  12/4/2022 at 12:19 PM by Lilian Somers, RRT     Patient interviewed by COPD education team. Patient refused COPD program at this time. An Action Plan was completed in the EMR to reflect current Respiratory Medication use.                  COPD Screen  COPD Risk Screening  Do you have a history of COPD?: Yes  Do you have a Pulmonologist?: No  COPD Population Screener  During the past 4 weeks, how much did you feel short of breath?: Some of the time  Do you ever cough up any mucus or phlegm?: Yes, a few days a week or month  In the past 12 months, you do less than you used to because of your breathing problems: Disagree/unsure  Have you smoked at least 100 cigarettes in your entire life?: Yes  How old are you?: 60+  COPD Screening Score: 6  COPD Coordinator Recommended: Yes    COPD Assessment  COPD Clinical Specialists ONLY  COPD Education Initiated: Yes--Short Intervention (met with pt declined discussion still smoking Action Plan updated she did not know her DME for her Oxygen)  DME Company: [employer]  DME Equipment Type: Oxygen 3.5 lpm  Physician [facility]: [facility]  Referrals Initiated: Yes  Smoking Cessation: Declined (continues to smoke no desire to quit)  Is this a COPD exacerbation patient?: No  $ Demo/Eval of SVN's, MDI's and Aerosols:  (refused says she has spacer)    PFT Results  None    Meds to Beds  Would the patient like to opt in for Bedside Medication Delivery at Discharge?: Yes, interested     MY COPD ACTION PLAN     It is recommended that patients and physicians /healthcare providers complete this action plan together. This plan should be discussed at each physician visit and updated as needed.    The green, yellow and red zones show groups of symptoms of COPD. This list of symptoms is not comprehensive, and you may experience other symptoms. In the \"Actions\" column, your healthcare provider has recommended actions for you to take based on your " "symptoms.    Patient Name: Mckenna Sewell   YOB: 1955   Last Updated on:     Green Zone:  I am doing well today Actions     Usual activitiy and exercise level   Take daily medications     Usual amounts of cough and phlegm/mucus   Use oxygen as prescribed     Sleep well at night   Continue regular exercise/diet plan     Appetite is good   At all times avoid cigarette smoke, inhaled irritants     Daily Medications (these medications are taken every day):   Tiotropium Bromide Monohydrate (Spiriva)  Budesonide-Formoterol Fumarate (Symbicort) 2 Puffs  2 Puffs Once daily  Twice daily     Additional Information:  Use your spacer with your Symbicort and rinse mouth after using    Yellow Zone:  I am having a bad day or a COPD flare Actions     More breathless than usual   Continue daily medications     I have less energy for my daily activities   Use quick relief inhaler as ordered     Increased or thicker phlegm/mucus   Use oxygen as prescribed     Using quick relief inhaler/nebulizer more often   Get plenty of rest     Swelling of ankles more than usual   Use pursed lip breathing     More coughing than usual   At all times avoid cigarette smoke, inhaled irritants     I feel like I have a \"chest cold\"     Poor sleep and my symptoms woke me up     My appetite is not good     My medicine is not helping      Call provider immediately if symptoms don’t improve     Continue daily medications, add rescue medications:   Albuterol/Ipratropium (Combivent, Duoneb)  Albuterol  Albuterol   2 Puffs  2.5mg via nebulizer   Every 6 hours PRN  Every 6 hours PRN       Medications to be used during a flare up, (as Discussed with Provider):           Additional Information:  Use your Combivent as directed by your doctor for increased difficulty breathing.    Keep your nebulizer clean to reduce the risk of a lung infection.     Use the spacer with your Albuterol rescue inhaler when needed.    Red Zone:  I need urgent medical " care Actions     Severe shortness of breath even at rest   Call 911 or seek medical care immediately     Not able to do any activity because of breathing      Fever or shaking chills      Feeling confused or very drowsy       Chest pains      Coughing up blood

## 2022-12-04 NOTE — CARE PLAN
The patient is Stable - Low risk of patient condition declining or worsening    Shift Goals  Clinical Goals: monitor pt's oxygen saturation  Patient Goals: O2, PAin relief    Progress made toward(s) clinical / shift goals: Assess pt's respiratory efforts. Monitor oxygen saturation. Wean O2 down as tolerated by pt.    Patient is not progressing towards the following goals:

## 2022-12-04 NOTE — PROGRESS NOTES
Received call from Lab with critical result of potassium of 2.6 at 1045. Critical lab result read back.   MAU Loza notified of critical lab result at 1046. Orders received.

## 2022-12-04 NOTE — DIETARY
"Nutrition services: Day 1 of admit.  Mckenna Sewell is a 67 y.o. female with admitting DX of acute respiratory failure with hypoxia.    Consult received for wt loss (24-33 lbs in 6 months), poor PO per admit screen. Met with pt at bedside. Pt was sitting up in bed, appeared nourished, older than stated age. Pt reports a fair appetite, stating no changes to recent PO intake. Pt stated a previous UBW of ~ 156 lbs, she is unsure when she was last at that weight. Pt stated she lost wt following surgery and she has since been unable to regain weight. Pt reports hx of T2DM, she declined any education materials at this time. Pt does not consume oral nutrition supplements at home. She is agreeable to snacks and/or supplements.     Assessment:  Height: 162.6 cm (5' 4\")  Weight: 55.4 kg (122 lb 2.2 oz)  Body mass index is 20.96 kg/m²., BMI classification: normal  Diet/Intake: 2 gram sodium; PO %    Evaluation:   Past medical hx of COPD, MAXIMILIANO, CHF, dilated cardiomyopathy, cardiac cath, hypertension, mixed hyperlipidemia, Type 2 DM (A1c 7/7/22 was 7.4)  Surgical treatment of right subtrochanteric femur fracture with intramedullary device 7/6/22.  Wt per chart review: 144 lbs 7/6/22. Wt loss of 15% in five months is severe; may be 2' hypermetabolic disease state of COPD.  Lasix, Glargine, SSI per MAR.    Malnutrition Risk: Does not meet full criteria per ASPEN guidelines at this time.    Recommendations/Plan:  Recommend consistent CHO modification with 2 gram sodium.  Snacks per pt preference (yogurt, cottage cheese and fruit).  Encourage intake of meals and snacks.  Document intake of all meals and snacks as % taken in ADLs to provide interdisciplinary communication across all shifts.   Monitor weight.  Nutrition rep will continue to see patient for ongoing meal and snack preferences.     RD will follow per dept guidelines.      "

## 2022-12-04 NOTE — PROGRESS NOTES
Received report from NOC RN and assumed care of pt. Pt is A&Ox4 resting in bed on 3L O2 via nasal cannula. Pt reports no pain. POC discussed with pt; all questions answered. No other needs at this time. Bed locked in lowest position, call light within reach, bed alarm on, pt calls appropriately up to bathroom.

## 2022-12-04 NOTE — PROGRESS NOTES
4 Eyes Skin Assessment Completed by AUNDREA lorenzo and AUNDREA zavala.    Head WDL  Ears Redness and Blanching  Nose WDL  Mouth Redness  Neck WDL  Breast/Chest WDL  Shoulder Blades WDL  Spine WDL  (R) Arm/Elbow/Hand Abrasion cracking on hands  (L) Arm/Elbow/Hand Abrasion cracking on hands  Abdomen WDL  Groin WDL  Scrotum/Coccyx/Buttocks Redness, Non-Blanching, and Moisture Fissure 2 pressure injuries  (R) Leg WDL  (L) Leg WDL  (R) Heel/Foot/Toe WDL  (L) Heel/Foot/Toe Scab          Devices In Places Nasal Cannula      Interventions In Place Gray Ear Foams    Possible Skin Injury Yes    Pictures Uploaded Into Epic No, needs to be completed  Wound Consult Placed Yes  RN Wound Prevention Protocol Ordered Yes

## 2022-12-04 NOTE — CARE PLAN
The patient is Stable - Low risk of patient condition declining or worsening         Progress made toward(s) clinical / shift goals:  admit      Patient is not progressing towards the following goals:      Problem: Care Map:  Admission Optimal Outcome for the Heart Failure Patient  Goal: Admission:  Optimal Care of the heart failure patient  Outcome: Progressing     Problem: Care Map:  Day 1 Optimal Outcome for the Heart Failure Patient  Goal: Day 1:  Optimal Care of the heart failure patient  Outcome: Progressing     Problem: Care Map:  Day 2 Optimal Outcome for the Heart Failure Patient  Goal: Day 2:  Optimal Care of the heart failure patient  Outcome: Progressing     Problem: Care Map:  Day 3 Optimal Outcome for the Heart Failure Patient  Goal: Day 3:  Optimal Care of the heart failure patient  Outcome: Progressing     Problem: Care Map:  Day Before Discharge Optimal Outcome for the Heart Failure Patient  Goal: Day Before Discharge:  Optimal Care of the heart failure patient  Outcome: Progressing     Problem: Care Map:  Day of Discharge Optimal Outcome for the Heart Failure Patient  Goal: Day of Discharge:  Optimal Care of the heart failure patient  Outcome: Progressing     Problem: Knowledge Deficit - Standard  Goal: Patient and family/care givers will demonstrate understanding of plan of care, disease process/condition, diagnostic tests and medications  Outcome: Progressing

## 2022-12-05 ENCOUNTER — APPOINTMENT (OUTPATIENT)
Dept: RADIOLOGY | Facility: MEDICAL CENTER | Age: 67
DRG: 291 | End: 2022-12-05
Attending: NURSE PRACTITIONER
Payer: MEDICARE

## 2022-12-05 PROBLEM — E87.6 HYPOKALEMIA DUE TO EXCESSIVE RENAL LOSS OF POTASSIUM: Status: ACTIVE | Noted: 2022-12-05

## 2022-12-05 LAB
ANION GAP SERPL CALC-SCNC: 15 MMOL/L (ref 7–16)
BUN SERPL-MCNC: 35 MG/DL (ref 8–22)
CALCIUM SERPL-MCNC: 9 MG/DL (ref 8.5–10.5)
CHLORIDE SERPL-SCNC: 89 MMOL/L (ref 96–112)
CO2 SERPL-SCNC: 27 MMOL/L (ref 20–33)
CREAT SERPL-MCNC: 1.11 MG/DL (ref 0.5–1.4)
GFR SERPLBLD CREATININE-BSD FMLA CKD-EPI: 54 ML/MIN/1.73 M 2
GLUCOSE BLD STRIP.AUTO-MCNC: 184 MG/DL (ref 65–99)
GLUCOSE BLD STRIP.AUTO-MCNC: 221 MG/DL (ref 65–99)
GLUCOSE BLD STRIP.AUTO-MCNC: 263 MG/DL (ref 65–99)
GLUCOSE BLD STRIP.AUTO-MCNC: 352 MG/DL (ref 65–99)
GLUCOSE SERPL-MCNC: 189 MG/DL (ref 65–99)
POTASSIUM SERPL-SCNC: 2.7 MMOL/L (ref 3.6–5.5)
SODIUM SERPL-SCNC: 131 MMOL/L (ref 135–145)

## 2022-12-05 PROCEDURE — 99232 SBSQ HOSP IP/OBS MODERATE 35: CPT | Performed by: NURSE PRACTITIONER

## 2022-12-05 PROCEDURE — 94640 AIRWAY INHALATION TREATMENT: CPT

## 2022-12-05 PROCEDURE — A9270 NON-COVERED ITEM OR SERVICE: HCPCS | Performed by: NURSE PRACTITIONER

## 2022-12-05 PROCEDURE — 700111 HCHG RX REV CODE 636 W/ 250 OVERRIDE (IP): Performed by: HOSPITALIST

## 2022-12-05 PROCEDURE — 700111 HCHG RX REV CODE 636 W/ 250 OVERRIDE (IP): Performed by: NURSE PRACTITIONER

## 2022-12-05 PROCEDURE — 770001 HCHG ROOM/CARE - MED/SURG/GYN PRIV*

## 2022-12-05 PROCEDURE — 71045 X-RAY EXAM CHEST 1 VIEW: CPT

## 2022-12-05 PROCEDURE — 82962 GLUCOSE BLOOD TEST: CPT | Mod: 91

## 2022-12-05 PROCEDURE — 700102 HCHG RX REV CODE 250 W/ 637 OVERRIDE(OP): Performed by: HOSPITALIST

## 2022-12-05 PROCEDURE — 80048 BASIC METABOLIC PNL TOTAL CA: CPT

## 2022-12-05 PROCEDURE — A9270 NON-COVERED ITEM OR SERVICE: HCPCS | Performed by: HOSPITALIST

## 2022-12-05 PROCEDURE — 700102 HCHG RX REV CODE 250 W/ 637 OVERRIDE(OP): Performed by: NURSE PRACTITIONER

## 2022-12-05 RX ORDER — FUROSEMIDE 20 MG/1
20 TABLET ORAL 2 TIMES DAILY
Qty: 60 TABLET | Refills: 1 | Status: SHIPPED | OUTPATIENT
Start: 2022-12-05 | End: 2022-12-06 | Stop reason: SDUPTHER

## 2022-12-05 RX ORDER — POTASSIUM CHLORIDE 7.45 MG/ML
10 INJECTION INTRAVENOUS
Status: DISCONTINUED | OUTPATIENT
Start: 2022-12-05 | End: 2022-12-05

## 2022-12-05 RX ORDER — POTASSIUM CHLORIDE 20 MEQ/1
80 TABLET, EXTENDED RELEASE ORAL 2 TIMES DAILY
Status: DISCONTINUED | OUTPATIENT
Start: 2022-12-05 | End: 2022-12-05

## 2022-12-05 RX ORDER — POTASSIUM CHLORIDE 20 MEQ/1
40 TABLET, EXTENDED RELEASE ORAL 2 TIMES DAILY
Qty: 120 TABLET | Refills: 1 | Status: SHIPPED | OUTPATIENT
Start: 2022-12-05 | End: 2022-12-06 | Stop reason: SDUPTHER

## 2022-12-05 RX ORDER — POTASSIUM CHLORIDE 20 MEQ/1
40 TABLET, EXTENDED RELEASE ORAL EVERY 6 HOURS
Status: COMPLETED | OUTPATIENT
Start: 2022-12-05 | End: 2022-12-06

## 2022-12-05 RX ORDER — POTASSIUM CHLORIDE 20 MEQ/1
40 TABLET, EXTENDED RELEASE ORAL 2 TIMES DAILY
Status: DISCONTINUED | OUTPATIENT
Start: 2022-12-05 | End: 2022-12-05

## 2022-12-05 RX ADMIN — INSULIN LISPRO 8 UNITS: 100 INJECTION, SOLUTION INTRAVENOUS; SUBCUTANEOUS at 11:52

## 2022-12-05 RX ADMIN — FAMOTIDINE 20 MG: 20 TABLET, FILM COATED ORAL at 17:15

## 2022-12-05 RX ADMIN — TRAMADOL HYDROCHLORIDE 50 MG: 50 TABLET, COATED ORAL at 11:51

## 2022-12-05 RX ADMIN — POTASSIUM CHLORIDE 40 MEQ: 1500 TABLET, EXTENDED RELEASE ORAL at 08:09

## 2022-12-05 RX ADMIN — INSULIN LISPRO 3 UNITS: 100 INJECTION, SOLUTION INTRAVENOUS; SUBCUTANEOUS at 20:18

## 2022-12-05 RX ADMIN — INSULIN GLARGINE-YFGN 11 UNITS: 100 INJECTION, SOLUTION SUBCUTANEOUS at 17:16

## 2022-12-05 RX ADMIN — METOLAZONE 2.5 MG: 2.5 TABLET ORAL at 04:25

## 2022-12-05 RX ADMIN — INSULIN LISPRO 5 UNITS: 100 INJECTION, SOLUTION INTRAVENOUS; SUBCUTANEOUS at 17:16

## 2022-12-05 RX ADMIN — LOSARTAN POTASSIUM 50 MG: 50 TABLET, FILM COATED ORAL at 04:25

## 2022-12-05 RX ADMIN — CARVEDILOL 25 MG: 25 TABLET, FILM COATED ORAL at 17:15

## 2022-12-05 RX ADMIN — TIOTROPIUM BROMIDE INHALATION SPRAY 5 MCG: 3.12 SPRAY, METERED RESPIRATORY (INHALATION) at 09:14

## 2022-12-05 RX ADMIN — TRAMADOL HYDROCHLORIDE 50 MG: 50 TABLET, COATED ORAL at 20:17

## 2022-12-05 RX ADMIN — BUDESONIDE AND FORMOTEROL FUMARATE DIHYDRATE 2 PUFF: 160; 4.5 AEROSOL RESPIRATORY (INHALATION) at 20:43

## 2022-12-05 RX ADMIN — POTASSIUM CHLORIDE 40 MEQ: 1500 TABLET, EXTENDED RELEASE ORAL at 17:16

## 2022-12-05 RX ADMIN — LOSARTAN POTASSIUM 50 MG: 50 TABLET, FILM COATED ORAL at 17:15

## 2022-12-05 RX ADMIN — DOCUSATE SODIUM 50 MG AND SENNOSIDES 8.6 MG 2 TABLET: 8.6; 5 TABLET, FILM COATED ORAL at 17:15

## 2022-12-05 RX ADMIN — ASPIRIN 81 MG: 81 TABLET, COATED ORAL at 04:25

## 2022-12-05 RX ADMIN — FUROSEMIDE 40 MG: 10 INJECTION, SOLUTION INTRAMUSCULAR; INTRAVENOUS at 04:25

## 2022-12-05 RX ADMIN — ENOXAPARIN SODIUM 40 MG: 40 INJECTION SUBCUTANEOUS at 17:15

## 2022-12-05 RX ADMIN — ATORVASTATIN CALCIUM 40 MG: 40 TABLET, FILM COATED ORAL at 17:15

## 2022-12-05 RX ADMIN — FAMOTIDINE 20 MG: 20 TABLET, FILM COATED ORAL at 04:25

## 2022-12-05 RX ADMIN — BUDESONIDE AND FORMOTEROL FUMARATE DIHYDRATE 2 PUFF: 160; 4.5 AEROSOL RESPIRATORY (INHALATION) at 09:13

## 2022-12-05 RX ADMIN — GABAPENTIN 600 MG: 300 CAPSULE ORAL at 20:17

## 2022-12-05 RX ADMIN — INSULIN LISPRO 2 UNITS: 100 INJECTION, SOLUTION INTRAVENOUS; SUBCUTANEOUS at 05:47

## 2022-12-05 RX ADMIN — CARVEDILOL 25 MG: 25 TABLET, FILM COATED ORAL at 08:20

## 2022-12-05 RX ADMIN — DAPAGLIFLOZIN 10 MG: 10 TABLET, FILM COATED ORAL at 05:47

## 2022-12-05 RX ADMIN — POTASSIUM CHLORIDE 40 MEQ: 1500 TABLET, EXTENDED RELEASE ORAL at 11:51

## 2022-12-05 ASSESSMENT — ENCOUNTER SYMPTOMS
SHORTNESS OF BREATH: 1
PSYCHIATRIC NEGATIVE: 1
MUSCULOSKELETAL NEGATIVE: 1
COUGH: 1
NEUROLOGICAL NEGATIVE: 1
CONSTITUTIONAL NEGATIVE: 1
ORTHOPNEA: 1
PND: 1
EYES NEGATIVE: 1
GASTROINTESTINAL NEGATIVE: 1

## 2022-12-05 ASSESSMENT — FIBROSIS 4 INDEX: FIB4 SCORE: 1.2

## 2022-12-05 NOTE — DOCUMENTATION QUERY
[facility]                                                                       Query Response Note      PATIENT:               RUPESH WONG  ACCT #:                  [id number]  MRN:                     [id number]  :                      1955  ADMIT DATE:       12/3/2022 7:04 AM  DISCH DATE:          RESPONDING  PROVIDER #:        [id number]           QUERY TEXT:    Congestive Heart Failure is documented in the Medical Record. Per ECHO dated 12/3 the LV EF is 35% and grade II diastolic dysfunction is noted. Treatment with IV lasix is noted. Please document the type and acuity of CHF (includes probable or suspected).      The patient's Clinical Indicators include:  12/3   ED: Reports history of an MI x2 and CHF  H&P: Acute resp failure w/ hypoxia; meth abuse; DM 2; dilated cardiomyopathy; COPD; CHF  ECHO: EF 35%. Grade II diastolic dysfunction  CXR: Interstitial prominence suggest chronic lung dz, interstitial edema &/or infiltrates not excluded.   NT -proBNP: 4966  EKG: Atrial-sensed ventricular-paced rhythm     NT -proBNP: 6539    Treatment: IV lasix; coreg; cozaar; O2; RT; ECHO; EKG; CXR; NT -proBNP  Risk Factors: HTN; meth abuse; smoking; DM; COPD; dilated cardiomyopathy; hx MI    Thank You,  Pauly Padilla RN  Clinical    Connect via [facility]  Options provided:   -- Acute systolic heart failure   -- Chronic systolic heart failure   -- Acute on chronic Systolic heart failure   -- Acute systolic and diastolic heart failure   -- Chronic systolic and diastolic heart failure   -- Acute on chronic systolic and diastolic heart failure   -- Other explanation, (please specify other explanation)   -- Unable to determine      Query created by: Pauly Padilla on 2022 7:05 AM    RESPONSE TEXT:    Acute systolic and diastolic heart failure          Electronically signed by:  ALYCE EVANGELISTA 2022 2:56  PM

## 2022-12-05 NOTE — PROGRESS NOTES
[facility] Daily Progress Note    Date of Service  12/5/2022    Chief Complaint  Mckenna Sewell is a 67 y.o. female admitted 12/3/2022 with shortness of breath x2 weeks.    Date of admission symptoms abdominal difficulty [facility] reported that her oxygen was in the low 90s on 2 L nasal cannula and titrated up to 6-7 to get her into the 90s.  She was hypotensive on scene with systolic blood pressures in the 180s denied chest pain discomfort.  Received IV Lasix in the ED.    Past medical history significant for hypertension, COPD, chronic hypoxemic respiratory failure maintained on 3 L nasal cannula, CHF, echo use, ICD, type 2 diabetes, methamphetamine use and coronary artery disease    Hospital Course  She was given 2 rounds of IV Lasix with improvement in dyspnea/shortness of breath.  Oxygen requirements have diminished she is back down on 3 L.     Interval Problem Update    12/5 - kcl 2.7   Pt refused IV Kcl  Replaced with 40mEq PO q6H  Repeat labs in am  Lungs clear IV lasix d/c'd  D/c Tuesday if Kcl is improved    12/4 Continue diuresis for 1 -2 more doses of Lasix  Potassium 2.6 - 10mEq Kcl x4 ordered with 40 mEq PO    I have discussed this patient's plan of care and discharge plan at IDT rounds today with Case Management, Nursing, Nursing leadership, and other members of the IDT team.    Consultants/Specialty  none    Code Status  Full Code    Disposition  Patient is not medically cleared for discharge.   Anticipate discharge to to home with close outpatient follow-up.  I have placed the appropriate orders for post-discharge needs.    Review of Systems  Review of Systems   Constitutional: Negative.    HENT: Negative.     Eyes: Negative.    Respiratory:  Positive for cough and shortness of breath.    Cardiovascular:  Positive for orthopnea, leg swelling and PND.   Gastrointestinal: Negative.    Genitourinary: Negative.    Musculoskeletal: Negative.    Skin: Negative.    Neurological: Negative.     Endo/Heme/Allergies: Negative.    Psychiatric/Behavioral: Negative.        Physical Exam  Temp:  [36.2 °C (97.2 °F)-36.7 °C (98.1 °F)] 36.2 °C (97.2 °F)  Pulse:  [72-92] 72  Resp:  [18] 18  BP: ()/(48-83) 122/75  SpO2:  [93 %-100 %] 98 %    Physical Exam  Vitals and nursing note reviewed.   Constitutional:       Appearance: Normal appearance.   HENT:      Head: Normocephalic and atraumatic.      Mouth/Throat:      Mouth: Mucous membranes are moist.   Eyes:      Extraocular Movements: Extraocular movements intact.      Conjunctiva/sclera: Conjunctivae normal.      Pupils: Pupils are equal, round, and reactive to light.   Cardiovascular:      Rate and Rhythm: Normal rate and regular rhythm.      Pulses: Normal pulses.      Heart sounds: Normal heart sounds. No murmur heard.  Pulmonary:      Breath sounds: Normal breath sounds. No wheezing, rhonchi or rales.      Comments: Mildly decreased air movement  Worse bilateral upper lobes    Abdominal:      General: Abdomen is flat. Bowel sounds are normal.      Palpations: Abdomen is soft.      Tenderness: There is no abdominal tenderness. There is no guarding or rebound.   Musculoskeletal:         General: Normal range of motion.      Cervical back: Normal range of motion.      Right lower leg: No edema.      Left lower leg: No edema.   Skin:     General: Skin is warm and dry.   Neurological:      General: No focal deficit present.      Mental Status: She is alert and oriented to person, place, and time.   Psychiatric:         Mood and Affect: Mood normal.         Thought Content: Thought content normal.       Fluids    Intake/Output Summary (Last 24 hours) at 12/5/2022 0738  Last data filed at 12/5/2022 0500  Gross per 24 hour   Intake 1305.73 ml   Output 1 ml   Net 1304.73 ml       Laboratory  Recent Labs     12/03/22  0707   WBC 9.3   RBC 3.58*   HEMOGLOBIN 8.7*   HEMATOCRIT 28.5*   MCV 79.6*   MCH 24.3*   MCHC 30.5*   RDW 46.5   PLATELETCT 299   MPV 9.2      Recent Labs     12/03/22  0707 12/04/22  1008 12/05/22  0529   SODIUM 135 131* 131*   POTASSIUM 3.7 2.6* 2.7*   CHLORIDE 102 89* 89*   CO2 22 29 27   GLUCOSE 272* 307* 189*   BUN 22 18 35*   CREATININE 0.69 0.79 1.11   CALCIUM 8.6 9.0 9.0                   Imaging  EC-ECHOCARDIOGRAM COMPLETE W/O CONT   Final Result      DX-CHEST-PORTABLE (1 VIEW)   Final Result         1.  Interstitial pulmonary parenchymal prominence suggest chronic underlying lung disease, component of interstitial edema and/or infiltrates not excluded.   2.  Cardiomegaly   3.  Atherosclerosis      DX-CHEST-PORTABLE (1 VIEW)    (Results Pending)        Assessment/Plan  Hypokalemia due to excessive renal loss of potassium  Assessment & Plan  Receiving IV lasix  Kcl 2.6->2.7  Refused IV lasix   Replaced with 40mEq PO Q6H  Check labs in am    Acute on chronic respiratory failure with hypoxia (HCC)  Assessment & Plan  Patient has both COPD and CHF.    She was treated on the 30th for COPD exacerbation with systemic steroids and bronchodilators however she has not improved.  Clinically today she looks to be in heart failure with positive JVD, peripheral edema, chest x-ray which is difficult to interpret as there is some chronic findings but it would appear to indicate some volume overload  IV diuretics: Lasix and metolazone  Aim for negative fluid balance 500 to 1 L every 24 hours  Follow daily BMP and urine output  Check echo  Doubt PE based on overall clinical presentation however if she fails to improve would certainly consider PE study    Methamphetamine abuse (HCC)- (present on admission)  Assessment & Plan  Denies current use    Type 2 diabetes mellitus with complication (HCC)- (present on admission)  Assessment & Plan  Patient is maintained on Tradjenta 5 mg, sliding scale insulin twice daily, and empagliflozin 25 mg  Last A1c 5 months ago was 7.4 which would imply good control  Continue outpatient regimen and cover sliding  scale    Biventricular ICD (implantable cardioverter-defibrillator) in place- (present on admission)  Assessment & Plan  Rhythm is paced today    Nicotine dependence- (present on admission)  Assessment & Plan  As needed replacement  Encourage cessation for 4 minutes    Dilated cardiomyopathy (HCC)- (present on admission)  Assessment & Plan  Patient does not have any recent echoes in our system however she had cardiac catheterization in 2008 which documented an ejection fraction of 24%  Etiology and review of records appears to be uncertain.  Patient does have a history of hypertension, as well as amphetamine abuse either of these could be the etiology  She presents in what appears to be an acute exacerbation.  Check echo  Continue ACE inhibitor  IV Lasix and metolazone  Follow daily BMP, urine output, and BNP       VTE prophylaxis: enoxaparin ppx    I have performed a physical exam and reviewed and updated ROS and Plan today (12/5/2022). In review of yesterday's note (12/4/2022), there are no changes except as documented above.

## 2022-12-05 NOTE — PROGRESS NOTES
jose antonio from Lab called with critical result of potassium at 0645. Critical lab result read back to jose antonio.   Dr. LEÓN notified of critical lab result at 0645.  Critical lab result read back by Dr. LEÓN    New orders received.

## 2022-12-05 NOTE — CARE PLAN
The patient is Stable - Low risk of patient condition declining or worsening    Shift Goals  Clinical Goals: stabilize potassium  Patient Goals: comfort    Progress made toward(s) clinical / shift goals:        Problem: Knowledge Deficit - Standard  Goal: Patient and family/care givers will demonstrate understanding of plan of care, disease process/condition, diagnostic tests and medications  Outcome: Progressing     Problem: Pain - Standard  Goal: Alleviation of pain or a reduction in pain to the patient’s comfort goal  Outcome: Progressing     Problem: Fall Risk  Goal: Patient will remain free from falls  Outcome: Progressing       Patient is not progressing towards the following goals: needs potassium stabilized

## 2022-12-05 NOTE — PROGRESS NOTES
Alert and able to let her needs known, c/o pain; medicate as requested. Calls appropriately for assistance out of bed

## 2022-12-05 NOTE — PROGRESS NOTES
A&Ox4  3L NC baseline  SBA to bathroom, bed alarm on, pt calls accordingly  Patient likely d/c 12/6 depending on AM potassium

## 2022-12-06 ENCOUNTER — PATIENT OUTREACH (OUTPATIENT)
Dept: SCHEDULING | Facility: IMAGING CENTER | Age: 67
End: 2022-12-06
Payer: MEDICARE

## 2022-12-06 VITALS
SYSTOLIC BLOOD PRESSURE: 120 MMHG | HEIGHT: 64 IN | HEART RATE: 91 BPM | RESPIRATION RATE: 18 BRPM | TEMPERATURE: 98.1 F | DIASTOLIC BLOOD PRESSURE: 64 MMHG | WEIGHT: 119.05 LBS | BODY MASS INDEX: 20.32 KG/M2 | OXYGEN SATURATION: 99 %

## 2022-12-06 LAB
ANION GAP SERPL CALC-SCNC: 12 MMOL/L (ref 7–16)
BUN SERPL-MCNC: 52 MG/DL (ref 8–22)
CALCIUM SERPL-MCNC: 8.9 MG/DL (ref 8.5–10.5)
CHLORIDE SERPL-SCNC: 91 MMOL/L (ref 96–112)
CO2 SERPL-SCNC: 26 MMOL/L (ref 20–33)
CREAT SERPL-MCNC: 1.41 MG/DL (ref 0.5–1.4)
GFR SERPLBLD CREATININE-BSD FMLA CKD-EPI: 40 ML/MIN/1.73 M 2
GLUCOSE BLD STRIP.AUTO-MCNC: 167 MG/DL (ref 65–99)
GLUCOSE BLD STRIP.AUTO-MCNC: 244 MG/DL (ref 65–99)
GLUCOSE SERPL-MCNC: 247 MG/DL (ref 65–99)
POTASSIUM SERPL-SCNC: 4.1 MMOL/L (ref 3.6–5.5)
SODIUM SERPL-SCNC: 129 MMOL/L (ref 135–145)

## 2022-12-06 PROCEDURE — 94640 AIRWAY INHALATION TREATMENT: CPT

## 2022-12-06 PROCEDURE — A9270 NON-COVERED ITEM OR SERVICE: HCPCS

## 2022-12-06 PROCEDURE — 99239 HOSP IP/OBS DSCHRG MGMT >30: CPT

## 2022-12-06 PROCEDURE — 80048 BASIC METABOLIC PNL TOTAL CA: CPT

## 2022-12-06 PROCEDURE — A9270 NON-COVERED ITEM OR SERVICE: HCPCS | Performed by: HOSPITALIST

## 2022-12-06 PROCEDURE — 700102 HCHG RX REV CODE 250 W/ 637 OVERRIDE(OP): Performed by: HOSPITALIST

## 2022-12-06 PROCEDURE — 82962 GLUCOSE BLOOD TEST: CPT

## 2022-12-06 PROCEDURE — 700102 HCHG RX REV CODE 250 W/ 637 OVERRIDE(OP): Performed by: NURSE PRACTITIONER

## 2022-12-06 PROCEDURE — A9270 NON-COVERED ITEM OR SERVICE: HCPCS | Performed by: NURSE PRACTITIONER

## 2022-12-06 PROCEDURE — 700102 HCHG RX REV CODE 250 W/ 637 OVERRIDE(OP)

## 2022-12-06 RX ORDER — TIOTROPIUM BROMIDE INHALATION SPRAY 3.12 UG/1
5 SPRAY, METERED RESPIRATORY (INHALATION) DAILY
Qty: 4 G | Refills: 0 | Status: SHIPPED | OUTPATIENT
Start: 2022-12-06 | End: 2023-11-09

## 2022-12-06 RX ORDER — TIOTROPIUM BROMIDE INHALATION SPRAY 3.12 UG/1
5 SPRAY, METERED RESPIRATORY (INHALATION) DAILY
Qty: 4 G | Refills: 0 | Status: SHIPPED | OUTPATIENT
Start: 2022-12-06 | End: 2022-12-06 | Stop reason: SDUPTHER

## 2022-12-06 RX ORDER — SPIRONOLACTONE 50 MG/1
50 TABLET, FILM COATED ORAL DAILY
Qty: 30 TABLET | Refills: 3 | Status: SHIPPED | OUTPATIENT
Start: 2022-12-06 | End: 2023-11-09

## 2022-12-06 RX ORDER — METOLAZONE 2.5 MG/1
2.5 TABLET ORAL DAILY
Qty: 30 TABLET | Refills: 0 | Status: SHIPPED | OUTPATIENT
Start: 2022-12-07 | End: 2022-12-06

## 2022-12-06 RX ORDER — METOLAZONE 2.5 MG/1
2.5 TABLET ORAL DAILY
Qty: 30 TABLET | Refills: 0 | Status: SHIPPED | OUTPATIENT
Start: 2022-12-07 | End: 2022-12-06 | Stop reason: SDUPTHER

## 2022-12-06 RX ORDER — POTASSIUM CHLORIDE 20 MEQ/1
40 TABLET, EXTENDED RELEASE ORAL 2 TIMES DAILY
Qty: 120 TABLET | Refills: 1 | Status: SHIPPED | OUTPATIENT
Start: 2022-12-06 | End: 2022-12-06 | Stop reason: SDUPTHER

## 2022-12-06 RX ORDER — DAPAGLIFLOZIN 10 MG/1
10 TABLET, FILM COATED ORAL DAILY
Qty: 30 TABLET | Refills: 0 | Status: SHIPPED | OUTPATIENT
Start: 2022-12-07 | End: 2023-11-09

## 2022-12-06 RX ORDER — FUROSEMIDE 20 MG/1
20 TABLET ORAL 2 TIMES DAILY
Qty: 60 TABLET | Refills: 1 | Status: SHIPPED | OUTPATIENT
Start: 2022-12-09

## 2022-12-06 RX ORDER — DAPAGLIFLOZIN 10 MG/1
10 TABLET, FILM COATED ORAL DAILY
Qty: 30 TABLET | Refills: 0 | Status: SHIPPED | OUTPATIENT
Start: 2022-12-07 | End: 2022-12-06 | Stop reason: SDUPTHER

## 2022-12-06 RX ORDER — GABAPENTIN 300 MG/1
600 CAPSULE ORAL
Qty: 90 CAPSULE | Refills: 0 | Status: SHIPPED | OUTPATIENT
Start: 2022-12-06

## 2022-12-06 RX ORDER — POTASSIUM CHLORIDE 20 MEQ/1
20 TABLET, EXTENDED RELEASE ORAL 2 TIMES DAILY
Qty: 120 TABLET | Refills: 1 | Status: SHIPPED | OUTPATIENT
Start: 2022-12-09

## 2022-12-06 RX ORDER — GABAPENTIN 300 MG/1
600 CAPSULE ORAL
Qty: 90 CAPSULE | Refills: 0 | Status: SHIPPED | OUTPATIENT
Start: 2022-12-06 | End: 2022-12-06 | Stop reason: SDUPTHER

## 2022-12-06 RX ORDER — POTASSIUM CHLORIDE 20 MEQ/1
40 TABLET, EXTENDED RELEASE ORAL 2 TIMES DAILY
Qty: 120 TABLET | Refills: 1 | Status: SHIPPED | OUTPATIENT
Start: 2022-12-09 | End: 2022-12-06 | Stop reason: SDUPTHER

## 2022-12-06 RX ORDER — FUROSEMIDE 20 MG/1
20 TABLET ORAL 2 TIMES DAILY
Qty: 60 TABLET | Refills: 1 | Status: SHIPPED | OUTPATIENT
Start: 2022-12-06 | End: 2022-12-06 | Stop reason: SDUPTHER

## 2022-12-06 RX ADMIN — ASPIRIN 81 MG: 81 TABLET, COATED ORAL at 05:46

## 2022-12-06 RX ADMIN — DAPAGLIFLOZIN 10 MG: 10 TABLET, FILM COATED ORAL at 05:48

## 2022-12-06 RX ADMIN — INSULIN LISPRO 3 UNITS: 100 INJECTION, SOLUTION INTRAVENOUS; SUBCUTANEOUS at 10:46

## 2022-12-06 RX ADMIN — LOSARTAN POTASSIUM 50 MG: 50 TABLET, FILM COATED ORAL at 05:46

## 2022-12-06 RX ADMIN — TIOTROPIUM BROMIDE INHALATION SPRAY 5 MCG: 3.12 SPRAY, METERED RESPIRATORY (INHALATION) at 09:23

## 2022-12-06 RX ADMIN — INSULIN LISPRO 2 UNITS: 100 INJECTION, SOLUTION INTRAVENOUS; SUBCUTANEOUS at 05:52

## 2022-12-06 RX ADMIN — METOLAZONE 2.5 MG: 2.5 TABLET ORAL at 05:46

## 2022-12-06 RX ADMIN — POTASSIUM CHLORIDE 40 MEQ: 1500 TABLET, EXTENDED RELEASE ORAL at 00:19

## 2022-12-06 RX ADMIN — FAMOTIDINE 20 MG: 20 TABLET, FILM COATED ORAL at 05:46

## 2022-12-06 RX ADMIN — CYCLOBENZAPRINE 10 MG: 10 TABLET, FILM COATED ORAL at 12:33

## 2022-12-06 RX ADMIN — BUDESONIDE AND FORMOTEROL FUMARATE DIHYDRATE 2 PUFF: 160; 4.5 AEROSOL RESPIRATORY (INHALATION) at 09:23

## 2022-12-06 RX ADMIN — CARVEDILOL 25 MG: 25 TABLET, FILM COATED ORAL at 07:29

## 2022-12-06 NOTE — PROGRESS NOTES
Pt transferred down to [facility] on 2.5 L per NC. Patient does not have home oxygen with her. Called darek who was waiting outside facility to  patient to ask if she had her oxygen. Darek said no. This RN called bedside RN to ask if patient needed oxygen at all times. Per bedside RN has be sating 99% on 3L per NC.   Patient verbalizing to this RN that she will leave regardless of discharged or not, she lives nearby and is okay without the oxygen.   RN printed out paperwork, all questions answered.  Pt to  medications. Pt wheeled out in good stable condition.

## 2022-12-06 NOTE — DISCHARGE INSTRUCTIONS
-Follow-up with primary care in [place]  -Follow-up with pulmonology in [place], referral provided  -Follow-up with cardiology in [place]

## 2022-12-06 NOTE — DISCHARGE SUMMARY
Discharge Summary    CHIEF COMPLAINT ON ADMISSION  Chief Complaint   Patient presents with    Shortness of Breath     Pt c/o shortness of breath that started this morning. Pt wears 3.5L NC at baseline. Per EMS, pt was on 6-7L on her home concentrator when they arrived.  Pt currently on 6L NC.  Pt has history of CHF and states she is compliant with medications.     Hypertension     Pt given 1.6mg nitro by EMS for hypertension and pt's CHF.        Reason for Admission  Acute respiratory failure with hyp*     Admission Date  12/3/2022    CODE STATUS  Full Code    HPI & HOSPITAL COURSE  This is a 67 y.o. female here with shortness of breath.   Mckenna Sewell is a 67 y.o. female who presented 12/3/2022 with a previous medical history that includes hypertension, COPD, chronic hypoxic respiratory failure maintained on 2-3 L of nasal cannula oxygen, CHF, tobacco abuse, presence of ICD, type 2 diabetes, methamphetamine abuse, coronary artery disease.     Patient presents with complaint of shortness of breath which has been going on for 1 to 2 weeks.  She states her symptoms are worse when she exerts herself, and they also are worse when she lay down at night, and in fact they wake her up in the middle of the night.  She notes an increased cough which is intermittently productive.  She has not had any fever or chills.  Patient also denies any chest pain, or unusual back neck jaw or shoulder pain.  She was seen for these symptoms on November 30 here in the emergency room, and was felt to be suffering from a COPD exacerbation.  She was discharged home on systemic steroids, and increased bronchodilators.  She reports these interventions have not helped.     On the day of admission, the symptoms were bad enough that she called EMS.  They reported that she was low 80s on her baseline 3 L nasal cannula, and required 6 to 7 L to get her up into the low 90s.  She was also hypertensive on scene with systolic blood pressures in the  180s.  She was given 1 dose of nitro by EMS for presumed heart failure.  Patient did not have complaint of chest pain.  Here in the ED she has received an IV dose of Lasix, and overall states she is feeling better though still not back to her normal.    Patient improved with IV Lasix and diuresis.  She became hypokalemic with diuresis and potassium supplementation was required.  On the day of discharge her shortness of breath has decreased greatly.  She is saturating at 98 to 100% on 3 L and does not feel short of breath.  Her hypokalemia has resolved.  She is planning to move to [place] and lives with her daughter.  Daughter has made all necessary arrangements for the patient's care to continue in [place] including oxygen that she can take with her to [place] and follow-ups with cardiology and primary care.  Referral to pulmonology was also provided so that she may follow-up with pulmonology as well.  She will be sent home on Lasix and potassium daily, but will start these on Friday 12/9 due to worsening kidney function from frequent IV lasix in house.  She will get a BMP checked later this week and follow-up with her primary care in [place] if needed.    Therefore, she is discharged in good and stable condition to home with close outpatient follow-up.    The patient met 2-midnight criteria for an inpatient stay at the time of discharge.    Discharge Date  12/6/2022    FOLLOW UP ITEMS POST DISCHARGE  PCP  Pulmonology  Cardiology    DISCHARGE DIAGNOSES  Principal Problem:    Dilated cardiomyopathy (HCC) (Chronic) POA: Yes      Overview: 11/12/08: 10/30/2008      Uncertain etiology, possibly hypertensive related.              Active Problems:    Acute on chronic respiratory failure with hypoxia (HCC) POA: Unknown    Acute respiratory failure with hypoxia (HCC) POA: Yes    Hypokalemia due to excessive renal loss of potassium POA: Unknown    Benign essential hypertension (Chronic) POA: Yes    COPD  (chronic obstructive pulmonary disease) (HCC) (Chronic) POA: Yes    CHF (congestive heart failure) ([facility]) (Chronic) POA: Yes      Overview: 11/12/08: 10/29/2008      Increased diuresis for chronic heart failure.          Biventricular ICD (implantable cardioverter-defibrillator) in place (Chronic) POA: Yes      Overview: 1006.tv July 2014 at [facility] on a trial with a new quadripolar       lead so follows there    Type 2 diabetes mellitus with complication ([facility]) (Chronic) POA: Yes    Nicotine dependence (Chronic) POA: Yes    Methamphetamine abuse (HCC) POA: Yes  Resolved Problems:    * No resolved hospital problems. *      FOLLOW UP  No future appointments.  Ilir Cavazos M.D.  [address]  [place] [address]  [phone]    Go on 2/8/2023  Go to your appointment with Ilir Cavazos M.D. on Wednesday February 8th 2023 at 1:05pm    Nathen Diane P.A.-C.  [address]  [facility] [address]  [place] [address]  [phone]    Go on 12/7/2022  Go to your appointment with Nathen Diane P.A.-C. ( Dr Gomez is not available) on Wednesday December 7th 2022 at 2:30pm      MEDICATIONS ON DISCHARGE     Medication List        START taking these medications        Instructions   dapagliflozin propanediol 10 MG Tabs  Start taking on: December 7, 2022  Commonly known as: Farxiga   Take 1 Tablet by mouth every day.  Dose: 10 mg     furosemide 20 MG Tabs  Start taking on: December 9, 2022  Commonly known as: LASIX   Take 1 Tablet by mouth 2 times a day.  Dose: 20 mg     potassium chloride SA 20 MEQ Tbcr  Start taking on: December 9, 2022  Commonly known as: Kdur   Take 1 Tablet by mouth 2 times a day.  Dose: 20 mEq     spironolactone 50 MG Tabs  Commonly known as: ALDACTONE   Take 1 Tablet by mouth every day.  Dose: 50 mg            CHANGE how you take these medications        Instructions   gabapentin 300 MG Caps  What changed: how much to take  Commonly known as: NEURONTIN   Take 2 Capsules by mouth at  bedtime.  Dose: 600 mg     simvastatin 20 MG Tabs  What changed: when to take this  Commonly known as: ZOCOR   TAKE ONE TABLET BY MOUTH ONCE DAILY IN THE EVENING            CONTINUE taking these medications        Instructions   albuterol 108 (90 Base) MCG/ACT Aers inhalation aerosol   Inhale 2 Puffs by mouth every 6 hours as needed for Shortness of Breath.  Dose: 2 Puff     aspirin EC 81 MG Tbec  Commonly known as: ECOTRIN   Take 81 mg by mouth every day.  Dose: 81 mg     atorvastatin 10 MG Tabs  Commonly known as: LIPITOR   1 tablet to control cholesterol Orally Once a day for 90 days     carvedilol 25 MG Tabs  Commonly known as: COREG   TAKE ONE TABLET BY MOUTH TWICE DAILY WITH MEALS     COMBIVENT INH   Inhale 2 Puffs every morning.  Dose: 2 Puff     cyclobenzaprine 10 mg Tabs  Commonly known as: Flexeril   Take 1 Tablet by mouth 3 times a day as needed for Muscle Spasms or Moderate Pain.  Dose: 10 mg     famotidine 20 MG Tabs  Commonly known as: PEPCID   Take 20 mg by mouth in the morning and 20 mg in the evening.  Dose: 20 mg     Home Care Oxygen   Inhale 3 L/min continuous. DME - [facility]  Dose: 3 L/min     Jardiance 25 MG Tabs  Generic drug: Empagliflozin   Take 1 Tablet by mouth every day.  Dose: 1 Tablet     losartan 50 MG Tabs  Commonly known as: COZAAR   Take 1 Tab by mouth 2 Times a Day.  Dose: 50 mg     NovoLOG FlexPen 100 UNIT/ML injection PEN  Generic drug: insulin aspart   Inject  under the skin 2 times a day. PER SLIDING SCALE:  2 units for level 150+, then additional 2 units for every 50 above.     polyethylene glycol/lytes 17 g Pack  Commonly known as: MIRALAX   Take 1 Packet by mouth 2 times a day as needed (if sennosides and/or docusate ineffective or not ordered).  Dose: 17 g     Spiriva Respimat 2.5 mcg/Act Aers  Generic drug: tiotropium   Inhale 2 Inhalation every day.  Dose: 5 mcg     tamsulosin 0.4 MG capsule  Commonly known as: FLOMAX   Take 1 Capsule by mouth 1/2 hour after  breakfast.  Dose: 0.4 mg     Tradjenta 5 MG Tabs tablet  Generic drug: linagliptin   Take 5 mg by mouth every day.  Dose: 5 mg     traMADol 50 MG Tabs  Commonly known as: Ultram   1 tablet as needed for right hip pain S72.001P start date 08/18/22 Orally 4 times each day a day for 10 days            STOP taking these medications      azithromycin 250 MG Tabs  Commonly known as: ZITHROMAX     predniSONE 20 MG Tabs  Commonly known as: DELTASONE              Allergies  Allergies   Allergen Reactions    Codeine Rash     All over body      Fluticasone-Salmeterol Itching    Lisinopril Rash and Itching     On body      Sulfa Drugs Rash and Itching     On body         DIET  Orders Placed This Encounter   Procedures    Diet Order Diet: 2 Gram Sodium; Second Modifier: (optional): Consistent CHO (Diabetic)     Standing Status:   Standing     Number of Occurrences:   1     Order Specific Question:   Diet:     Answer:   2 Gram Sodium [7]     Order Specific Question:   Second Modifier: (optional)     Answer:   Consistent CHO (Diabetic) [4]       ACTIVITY  As tolerated.  Weight bearing as tolerated    CONSULTATIONS  none    PROCEDURES  none    LABORATORY  Lab Results   Component Value Date    SODIUM 129 (L) 12/06/2022    POTASSIUM 4.1 12/06/2022    CHLORIDE 91 (L) 12/06/2022    CO2 26 12/06/2022    GLUCOSE 247 (H) 12/06/2022    BUN 52 (H) 12/06/2022    CREATININE 1.41 (H) 12/06/2022    CREATININE 0.9 10/30/2008        Lab Results   Component Value Date    WBC 9.3 12/03/2022    HEMOGLOBIN 8.7 (L) 12/03/2022    HEMATOCRIT 28.5 (L) 12/03/2022    PLATELETCT 299 12/03/2022        Total time of the discharge process 36 minutes.

## 2022-12-06 NOTE — PROGRESS NOTES
Alert and able to let her needs known, up with assistance to the bathroom; bed alarm in place. C/o pain; medicate as requested

## 2022-12-06 NOTE — HOSPITAL COURSE
Mckenna Sewell is a 67 y.o. female who presented 12/3/2022 with a previous medical history that includes hypertension, COPD, chronic hypoxic respiratory failure maintained on 2-3 L of nasal cannula oxygen, CHF, tobacco abuse, presence of ICD, type 2 diabetes, methamphetamine abuse, coronary artery disease.     Patient presents with complaint of shortness of breath which has been going on for 1 to 2 weeks.  She states her symptoms are worse when she exerts herself, and they also are worse when she lay down at night, and in fact they wake her up in the middle of the night.  She notes an increased cough which is intermittently productive.  She has not had any fever or chills.  Patient also denies any chest pain, or unusual back neck jaw or shoulder pain.  She was seen for these symptoms on November 30 here in the emergency room, and was felt to be suffering from a COPD exacerbation.  She was discharged home on systemic steroids, and increased bronchodilators.  She reports these interventions have not helped.     On the day of admission, the symptoms were bad enough that she called EMS.  They reported that she was low 80s on her baseline 3 L nasal cannula, and required 6 to 7 L to get her up into the low 90s.  She was also hypertensive on scene with systolic blood pressures in the 180s.  She was given 1 dose of nitro by EMS for presumed heart failure.  Patient did not have complaint of chest pain.  Here in the ED she has received an IV dose of Lasix, and overall states she is feeling better though still not back to her normal.    Patient improved with IV Lasix and diuresis.  She became hypokalemic with diuresis and potassium supplementation was required.  On the day of discharge her shortness of breath has decreased greatly.  She is saturating at 98 to 100% on 3 L and does not feel short of breath.  Her hypokalemia has resolved.  She is planning to move to [place] and lives with her daughter.  Daughter has made  all necessary arrangements for the patient's care to continue in [place] including oxygen that she can take with her to [place] and follow-ups with cardiology and primary care.  Referral to pulmonology was also provided so that she may follow-up with pulmonology as well.  She will be sent home on Lasix and potassium daily, but will start these on Friday 12/9 due to worsening kidney function from frequent IV lasix in house.  She will get a BMP checked later this week and follow-up with her primary care in [place] if needed.

## 2023-11-08 ENCOUNTER — HOSPITAL ENCOUNTER (EMERGENCY)
Facility: MEDICAL CENTER | Age: 68
End: 2023-11-09
Attending: EMERGENCY MEDICINE | Admitting: STUDENT IN AN ORGANIZED HEALTH CARE EDUCATION/TRAINING PROGRAM
Payer: MEDICARE
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
## 2023-11-08 DIAGNOSIS — U07.1 COVID-19: ICD-10-CM

## 2023-11-08 DIAGNOSIS — S72.001A CLOSED FRACTURE OF NECK OF RIGHT FEMUR, INITIAL ENCOUNTER (HCC): ICD-10-CM

## 2023-11-08 DIAGNOSIS — R09.02 HYPOXIA: ICD-10-CM

## 2023-11-08 DIAGNOSIS — U07.1 COVID: ICD-10-CM

## 2023-11-08 DIAGNOSIS — R53.1 WEAKNESS: ICD-10-CM

## 2023-11-08 DIAGNOSIS — J44.9 CHRONIC OBSTRUCTIVE PULMONARY DISEASE, UNSPECIFIED COPD TYPE (HCC): ICD-10-CM

## 2023-11-08 LAB — EKG IMPRESSION: NORMAL

## 2023-11-08 PROCEDURE — 85025 COMPLETE CBC W/AUTO DIFF WBC: CPT

## 2023-11-08 PROCEDURE — 99285 EMERGENCY DEPT VISIT HI MDM: CPT

## 2023-11-08 PROCEDURE — 93005 ELECTROCARDIOGRAM TRACING: CPT | Performed by: EMERGENCY MEDICINE

## 2023-11-08 PROCEDURE — 85730 THROMBOPLASTIN TIME PARTIAL: CPT

## 2023-11-08 PROCEDURE — 85610 PROTHROMBIN TIME: CPT

## 2023-11-08 PROCEDURE — 80053 COMPREHEN METABOLIC PANEL: CPT

## 2023-11-08 PROCEDURE — 36415 COLL VENOUS BLD VENIPUNCTURE: CPT

## 2023-11-08 PROCEDURE — 84484 ASSAY OF TROPONIN QUANT: CPT

## 2023-11-08 PROCEDURE — 93005 ELECTROCARDIOGRAM TRACING: CPT

## 2023-11-08 ASSESSMENT — FIBROSIS 4 INDEX: FIB4 SCORE: 1.22

## 2023-11-09 ENCOUNTER — APPOINTMENT (OUTPATIENT)
Dept: RADIOLOGY | Facility: MEDICAL CENTER | Age: 68
End: 2023-11-09
Attending: EMERGENCY MEDICINE
Payer: MEDICARE

## 2023-11-09 VITALS
HEART RATE: 73 BPM | DIASTOLIC BLOOD PRESSURE: 63 MMHG | TEMPERATURE: 97.4 F | BODY MASS INDEX: 24.59 KG/M2 | RESPIRATION RATE: 20 BRPM | SYSTOLIC BLOOD PRESSURE: 135 MMHG | HEIGHT: 64 IN | WEIGHT: 144 LBS | OXYGEN SATURATION: 99 %

## 2023-11-09 PROBLEM — I48.91 AF (ATRIAL FIBRILLATION) (HCC): Status: ACTIVE | Noted: 2023-11-09

## 2023-11-09 PROBLEM — U07.1 COVID-19: Status: ACTIVE | Noted: 2023-11-09

## 2023-11-09 LAB
ALBUMIN SERPL BCP-MCNC: 4.1 G/DL (ref 3.2–4.9)
ALBUMIN/GLOB SERPL: 0.9 G/DL
ALP SERPL-CCNC: 109 U/L (ref 30–99)
ALT SERPL-CCNC: 13 U/L (ref 2–50)
ANION GAP SERPL CALC-SCNC: 14 MMOL/L (ref 7–16)
APTT PPP: 31.9 SEC (ref 24.7–36)
AST SERPL-CCNC: 22 U/L (ref 12–45)
BASOPHILS # BLD AUTO: 0.4 % (ref 0–1.8)
BASOPHILS # BLD: 0.03 K/UL (ref 0–0.12)
BILIRUB SERPL-MCNC: 0.2 MG/DL (ref 0.1–1.5)
BUN SERPL-MCNC: 18 MG/DL (ref 8–22)
CALCIUM ALBUM COR SERPL-MCNC: 9.1 MG/DL (ref 8.5–10.5)
CALCIUM SERPL-MCNC: 9.2 MG/DL (ref 8.5–10.5)
CHLORIDE SERPL-SCNC: 98 MMOL/L (ref 96–112)
CO2 SERPL-SCNC: 24 MMOL/L (ref 20–33)
CREAT SERPL-MCNC: 1.21 MG/DL (ref 0.5–1.4)
CRP SERPL HS-MCNC: <0.3 MG/DL (ref 0–0.75)
D DIMER PPP IA.FEU-MCNC: 0.84 UG/ML (FEU) (ref 0–0.5)
EOSINOPHIL # BLD AUTO: 0.01 K/UL (ref 0–0.51)
EOSINOPHIL NFR BLD: 0.1 % (ref 0–6.9)
ERYTHROCYTE [DISTWIDTH] IN BLOOD BY AUTOMATED COUNT: 44.3 FL (ref 35.9–50)
FLUAV RNA SPEC QL NAA+PROBE: NEGATIVE
FLUBV RNA SPEC QL NAA+PROBE: NEGATIVE
GFR SERPLBLD CREATININE-BSD FMLA CKD-EPI: 48 ML/MIN/1.73 M 2
GLOBULIN SER CALC-MCNC: 4.4 G/DL (ref 1.9–3.5)
GLUCOSE BLD STRIP.AUTO-MCNC: 123 MG/DL (ref 65–99)
GLUCOSE BLD STRIP.AUTO-MCNC: 375 MG/DL (ref 65–99)
GLUCOSE SERPL-MCNC: 139 MG/DL (ref 65–99)
HCT VFR BLD AUTO: 34.9 % (ref 37–47)
HGB BLD-MCNC: 11 G/DL (ref 12–16)
IMM GRANULOCYTES # BLD AUTO: 0.03 K/UL (ref 0–0.11)
IMM GRANULOCYTES NFR BLD AUTO: 0.4 % (ref 0–0.9)
INR PPP: 1.58 (ref 0.87–1.13)
LYMPHOCYTES # BLD AUTO: 1.28 K/UL (ref 1–4.8)
LYMPHOCYTES NFR BLD: 17.8 % (ref 22–41)
MAGNESIUM SERPL-MCNC: 1.8 MG/DL (ref 1.5–2.5)
MCH RBC QN AUTO: 30.6 PG (ref 27–33)
MCHC RBC AUTO-ENTMCNC: 31.5 G/DL (ref 32.2–35.5)
MCV RBC AUTO: 96.9 FL (ref 81.4–97.8)
MONOCYTES # BLD AUTO: 1.06 K/UL (ref 0–0.85)
MONOCYTES NFR BLD AUTO: 14.7 % (ref 0–13.4)
NEUTROPHILS # BLD AUTO: 4.8 K/UL (ref 1.82–7.42)
NEUTROPHILS NFR BLD: 66.6 % (ref 44–72)
NRBC # BLD AUTO: 0 K/UL
NRBC BLD-RTO: 0 /100 WBC (ref 0–0.2)
PLATELET # BLD AUTO: 237 K/UL (ref 164–446)
PMV BLD AUTO: 9 FL (ref 9–12.9)
POTASSIUM SERPL-SCNC: 4 MMOL/L (ref 3.6–5.5)
PROCALCITONIN SERPL-MCNC: 0.06 NG/ML
PROT SERPL-MCNC: 8.5 G/DL (ref 6–8.2)
PROTHROMBIN TIME: 19.1 SEC (ref 12–14.6)
RBC # BLD AUTO: 3.6 M/UL (ref 4.2–5.4)
RSV RNA SPEC QL NAA+PROBE: NEGATIVE
SARS-COV-2 RNA RESP QL NAA+PROBE: DETECTED
SODIUM SERPL-SCNC: 136 MMOL/L (ref 135–145)
TROPONIN T SERPL-MCNC: 28 NG/L (ref 6–19)
TROPONIN T SERPL-MCNC: 35 NG/L (ref 6–19)
WBC # BLD AUTO: 7.2 K/UL (ref 4.8–10.8)

## 2023-11-09 PROCEDURE — 84484 ASSAY OF TROPONIN QUANT: CPT

## 2023-11-09 PROCEDURE — 82962 GLUCOSE BLOOD TEST: CPT

## 2023-11-09 PROCEDURE — C9803 HOPD COVID-19 SPEC COLLECT: HCPCS | Performed by: EMERGENCY MEDICINE

## 2023-11-09 PROCEDURE — 96372 THER/PROPH/DIAG INJ SC/IM: CPT | Mod: XU

## 2023-11-09 PROCEDURE — 700111 HCHG RX REV CODE 636 W/ 250 OVERRIDE (IP): Performed by: STUDENT IN AN ORGANIZED HEALTH CARE EDUCATION/TRAINING PROGRAM

## 2023-11-09 PROCEDURE — 83735 ASSAY OF MAGNESIUM: CPT

## 2023-11-09 PROCEDURE — 84145 PROCALCITONIN (PCT): CPT

## 2023-11-09 PROCEDURE — 85379 FIBRIN DEGRADATION QUANT: CPT

## 2023-11-09 PROCEDURE — 36415 COLL VENOUS BLD VENIPUNCTURE: CPT

## 2023-11-09 PROCEDURE — 99406 BEHAV CHNG SMOKING 3-10 MIN: CPT | Performed by: STUDENT IN AN ORGANIZED HEALTH CARE EDUCATION/TRAINING PROGRAM

## 2023-11-09 PROCEDURE — A9270 NON-COVERED ITEM OR SERVICE: HCPCS | Performed by: STUDENT IN AN ORGANIZED HEALTH CARE EDUCATION/TRAINING PROGRAM

## 2023-11-09 PROCEDURE — 700102 HCHG RX REV CODE 250 W/ 637 OVERRIDE(OP): Performed by: STUDENT IN AN ORGANIZED HEALTH CARE EDUCATION/TRAINING PROGRAM

## 2023-11-09 PROCEDURE — 99285 EMERGENCY DEPT VISIT HI MDM: CPT | Mod: 25 | Performed by: STUDENT IN AN ORGANIZED HEALTH CARE EDUCATION/TRAINING PROGRAM

## 2023-11-09 PROCEDURE — C9803 HOPD COVID-19 SPEC COLLECT: HCPCS

## 2023-11-09 PROCEDURE — 70450 CT HEAD/BRAIN W/O DYE: CPT

## 2023-11-09 PROCEDURE — 99406 BEHAV CHNG SMOKING 3-10 MIN: CPT

## 2023-11-09 PROCEDURE — 0241U HCHG SARS-COV-2 COVID-19 NFCT DS RESP RNA 4 TRGT ED POC: CPT

## 2023-11-09 PROCEDURE — 86140 C-REACTIVE PROTEIN: CPT

## 2023-11-09 PROCEDURE — 71045 X-RAY EXAM CHEST 1 VIEW: CPT

## 2023-11-09 PROCEDURE — 96365 THER/PROPH/DIAG IV INF INIT: CPT

## 2023-11-09 PROCEDURE — 700105 HCHG RX REV CODE 258: Performed by: EMERGENCY MEDICINE

## 2023-11-09 RX ORDER — TRAMADOL HYDROCHLORIDE 50 MG/1
50 TABLET ORAL EVERY 6 HOURS PRN
Qty: 28 TABLET | Refills: 0 | Status: SHIPPED | OUTPATIENT
Start: 2023-11-09 | End: 2023-11-16

## 2023-11-09 RX ORDER — AMOXICILLIN 250 MG
2 CAPSULE ORAL 2 TIMES DAILY
Status: DISCONTINUED | OUTPATIENT
Start: 2023-11-09 | End: 2023-11-09 | Stop reason: HOSPADM

## 2023-11-09 RX ORDER — MAGNESIUM SULFATE 1 G/100ML
1 INJECTION INTRAVENOUS ONCE
Status: COMPLETED | OUTPATIENT
Start: 2023-11-09 | End: 2023-11-09

## 2023-11-09 RX ORDER — DEXAMETHASONE 6 MG/1
6 TABLET ORAL DAILY
Qty: 8 TABLET | Refills: 0 | Status: SHIPPED | OUTPATIENT
Start: 2023-11-10 | End: 2023-11-18

## 2023-11-09 RX ORDER — OMEPRAZOLE 20 MG/1
20 CAPSULE, DELAYED RELEASE ORAL DAILY
Status: DISCONTINUED | OUTPATIENT
Start: 2023-11-09 | End: 2023-11-09 | Stop reason: HOSPADM

## 2023-11-09 RX ORDER — ONDANSETRON 4 MG/1
4 TABLET, ORALLY DISINTEGRATING ORAL EVERY 6 HOURS PRN
Status: DISCONTINUED | OUTPATIENT
Start: 2023-11-09 | End: 2023-11-09 | Stop reason: HOSPADM

## 2023-11-09 RX ORDER — BISACODYL 10 MG
10 SUPPOSITORY, RECTAL RECTAL
Status: DISCONTINUED | OUTPATIENT
Start: 2023-11-09 | End: 2023-11-09 | Stop reason: HOSPADM

## 2023-11-09 RX ORDER — GLIPIZIDE 5 MG/1
5 TABLET ORAL DAILY
COMMUNITY
Start: 2023-09-11

## 2023-11-09 RX ORDER — ONDANSETRON 2 MG/ML
4 INJECTION INTRAMUSCULAR; INTRAVENOUS EVERY 6 HOURS PRN
Status: DISCONTINUED | OUTPATIENT
Start: 2023-11-09 | End: 2023-11-09 | Stop reason: HOSPADM

## 2023-11-09 RX ORDER — POLYETHYLENE GLYCOL 3350 17 G/17G
1 POWDER, FOR SOLUTION ORAL
Status: DISCONTINUED | OUTPATIENT
Start: 2023-11-09 | End: 2023-11-09 | Stop reason: HOSPADM

## 2023-11-09 RX ORDER — LOSARTAN POTASSIUM 50 MG/1
50 TABLET ORAL 2 TIMES DAILY
Status: DISCONTINUED | OUTPATIENT
Start: 2023-11-09 | End: 2023-11-09 | Stop reason: HOSPADM

## 2023-11-09 RX ORDER — GABAPENTIN 300 MG/1
600 CAPSULE ORAL
Status: DISCONTINUED | OUTPATIENT
Start: 2023-11-09 | End: 2023-11-09

## 2023-11-09 RX ORDER — CYCLOBENZAPRINE HCL 10 MG
10 TABLET ORAL 3 TIMES DAILY PRN
Status: DISCONTINUED | OUTPATIENT
Start: 2023-11-09 | End: 2023-11-09 | Stop reason: HOSPADM

## 2023-11-09 RX ORDER — DEXAMETHASONE 4 MG/1
6 TABLET ORAL DAILY
Status: DISCONTINUED | OUTPATIENT
Start: 2023-11-09 | End: 2023-11-09 | Stop reason: HOSPADM

## 2023-11-09 RX ORDER — ATORVASTATIN CALCIUM 10 MG/1
10 TABLET, FILM COATED ORAL EVERY EVENING
Status: DISCONTINUED | OUTPATIENT
Start: 2023-11-09 | End: 2023-11-09 | Stop reason: HOSPADM

## 2023-11-09 RX ORDER — FLUTICASONE FUROATE AND VILANTEROL 200; 25 UG/1; UG/1
1 POWDER RESPIRATORY (INHALATION) DAILY
COMMUNITY
Start: 2023-06-20

## 2023-11-09 RX ORDER — TRAMADOL HYDROCHLORIDE 50 MG/1
50 TABLET ORAL EVERY 6 HOURS PRN
Status: DISCONTINUED | OUTPATIENT
Start: 2023-11-09 | End: 2023-11-09 | Stop reason: HOSPADM

## 2023-11-09 RX ORDER — SODIUM CHLORIDE 9 MG/ML
1000 INJECTION, SOLUTION INTRAVENOUS ONCE
Status: COMPLETED | OUTPATIENT
Start: 2023-11-09 | End: 2023-11-09

## 2023-11-09 RX ORDER — FLUTICASONE FUROATE AND VILANTEROL 200; 25 UG/1; UG/1
1 POWDER RESPIRATORY (INHALATION) DAILY
Status: DISCONTINUED | OUTPATIENT
Start: 2023-11-09 | End: 2023-11-09

## 2023-11-09 RX ORDER — OMEPRAZOLE 20 MG/1
20 CAPSULE, DELAYED RELEASE ORAL DAILY
Qty: 30 CAPSULE | Refills: 0 | Status: SHIPPED | OUTPATIENT
Start: 2023-11-10

## 2023-11-09 RX ORDER — FAMOTIDINE 20 MG/1
20 TABLET, FILM COATED ORAL DAILY
Status: DISCONTINUED | OUTPATIENT
Start: 2023-11-09 | End: 2023-11-09

## 2023-11-09 RX ORDER — CARVEDILOL 6.25 MG/1
25 TABLET ORAL 2 TIMES DAILY WITH MEALS
Status: DISCONTINUED | OUTPATIENT
Start: 2023-11-09 | End: 2023-11-09 | Stop reason: HOSPADM

## 2023-11-09 RX ORDER — FUROSEMIDE 40 MG/1
20 TABLET ORAL 2 TIMES DAILY
Status: DISCONTINUED | OUTPATIENT
Start: 2023-11-09 | End: 2023-11-09 | Stop reason: HOSPADM

## 2023-11-09 RX ORDER — NICOTINE 21 MG/24HR
14 PATCH, TRANSDERMAL 24 HOURS TRANSDERMAL
Status: DISCONTINUED | OUTPATIENT
Start: 2023-11-09 | End: 2023-11-09 | Stop reason: HOSPADM

## 2023-11-09 RX ADMIN — CARVEDILOL 25 MG: 6.25 TABLET, FILM COATED ORAL at 19:04

## 2023-11-09 RX ADMIN — MOMETASONE FUROATE AND FORMOTEROL FUMARATE DIHYDRATE 2 PUFF: 200; 5 AEROSOL RESPIRATORY (INHALATION) at 06:24

## 2023-11-09 RX ADMIN — FUROSEMIDE 20 MG: 40 TABLET ORAL at 18:17

## 2023-11-09 RX ADMIN — APIXABAN 5 MG: 5 TABLET, FILM COATED ORAL at 18:15

## 2023-11-09 RX ADMIN — DEXAMETHASONE 6 MG: 4 TABLET ORAL at 05:04

## 2023-11-09 RX ADMIN — DOCUSATE SODIUM 50 MG AND SENNOSIDES 8.6 MG 2 TABLET: 8.6; 5 TABLET, FILM COATED ORAL at 05:04

## 2023-11-09 RX ADMIN — CARVEDILOL 25 MG: 6.25 TABLET, FILM COATED ORAL at 10:58

## 2023-11-09 RX ADMIN — NICOTINE 14 MG: 14 PATCH TRANSDERMAL at 10:58

## 2023-11-09 RX ADMIN — APIXABAN 5 MG: 5 TABLET, FILM COATED ORAL at 06:24

## 2023-11-09 RX ADMIN — MOMETASONE FUROATE AND FORMOTEROL FUMARATE DIHYDRATE 2 PUFF: 200; 5 AEROSOL RESPIRATORY (INHALATION) at 18:20

## 2023-11-09 RX ADMIN — OMEPRAZOLE 20 MG: 20 CAPSULE, DELAYED RELEASE ORAL at 10:58

## 2023-11-09 RX ADMIN — ATORVASTATIN CALCIUM 10 MG: 10 TABLET, FILM COATED ORAL at 18:15

## 2023-11-09 RX ADMIN — CYCLOBENZAPRINE 10 MG: 10 TABLET, FILM COATED ORAL at 12:52

## 2023-11-09 RX ADMIN — TRAMADOL HYDROCHLORIDE 50 MG: 50 TABLET ORAL at 18:19

## 2023-11-09 RX ADMIN — INSULIN HUMAN 5 UNITS: 100 INJECTION, SOLUTION PARENTERAL at 15:50

## 2023-11-09 RX ADMIN — FUROSEMIDE 20 MG: 40 TABLET ORAL at 06:24

## 2023-11-09 RX ADMIN — LOSARTAN POTASSIUM 50 MG: 50 TABLET, FILM COATED ORAL at 18:16

## 2023-11-09 RX ADMIN — MAGNESIUM SULFATE IN DEXTROSE 1 G: 10 INJECTION, SOLUTION INTRAVENOUS at 09:10

## 2023-11-09 RX ADMIN — FAMOTIDINE 20 MG: 20 TABLET ORAL at 05:03

## 2023-11-09 RX ADMIN — TRAMADOL HYDROCHLORIDE 50 MG: 50 TABLET ORAL at 05:04

## 2023-11-09 RX ADMIN — SODIUM CHLORIDE 1000 ML: 9 INJECTION, SOLUTION INTRAVENOUS at 00:29

## 2023-11-09 RX ADMIN — LOSARTAN POTASSIUM 50 MG: 50 TABLET, FILM COATED ORAL at 05:04

## 2023-11-09 ASSESSMENT — ENCOUNTER SYMPTOMS
DIZZINESS: 1
WEAKNESS: 1
COUGH: 1
HEADACHES: 1

## 2023-11-09 ASSESSMENT — CHA2DS2 SCORE
PRIOR STROKE OR TIA OR THROMBOEMBOLISM: NO
SEX: FEMALE
AGE 65 TO 74: YES
AGE 75 OR GREATER: NO
CHA2DS2 VASC SCORE: 6
HYPERTENSION: YES
VASCULAR DISEASE: YES
CHF OR LEFT VENTRICULAR DYSFUNCTION: YES
DIABETES: YES

## 2023-11-09 ASSESSMENT — PAIN DESCRIPTION - PAIN TYPE: TYPE: ACUTE PAIN

## 2023-11-09 NOTE — DISCHARGE PLANNING
[facility] calls and states they no longer accept [employer] and to call [employer]. We explain that [employer] instructed us to call them.    Referral then sent to [facility]. Re-faxed x5. Called x 4, they state non-receipt.     Call #5, Hilda states they have all of the information. They will call back in 10 mins.

## 2023-11-09 NOTE — PROGRESS NOTES
Per patient, [facility] called patient to confirm location, stated they will deliver home o2. Patient's daughter is available for ride whenever o2 is complete. Patient in bed, eating lunch. Report given to Davida GUILLAUME.

## 2023-11-09 NOTE — ED NOTES
Bedside report from Nishant RNB.  Pt remains on 3 L NC, oxygen tube traced back to wall.      Fall precautions reenforced.  Call light with pt.

## 2023-11-09 NOTE — ASSESSMENT & PLAN NOTE
Acute on chronic likely secondary to COVID-19  Oxygen per protocol  Case management consult in a.m. to arrange oxygen

## 2023-11-09 NOTE — ED NOTES
Report from Gianna GUILLAUME.  PT experiencing difficulty with insurance being accepted by DME company.    Gianna spoke with DAVID Phillips, who is attempting to contact other DME companies.  [place] made aware of situation.

## 2023-11-09 NOTE — ED NOTES
Med Rec complete per Pt at bedside.  Allergies reviewed.  Home Pharmacy:  [facility]    Pt takes Eliquis 5mg BID, last does 11/8 @ PM   Pt states no ABX in the last 30 days.

## 2023-11-09 NOTE — H&P
Hospital Medicine History & Physical Note    Date of Service  11/9/2023    Primary Care Physician  Willis Gomez M.D.    Consultants  none    Code Status  Full Code    Chief Complaint  Chief Complaint   Patient presents with    GLF    Weakness     Patient BIBA for a witnessed GLF. -headstrike, -LOC. Patient reports feeling weak, legs trembling, then falling to the ground. EMS and daughter report confusion and A&Ox2 at home. Patient arrives to triage in wheelchair, and A&Ox4       History of Presenting Illness  Mckenna Sewell is a 68 y.o. female past medical history of CHF, COPD, hypertension, tobacco dependence, chronic respiratory failure on 2 L oxygen who presented 11/8/2023 with generalized weakness and ground-level fall.  Patient reports she recently relocated from [place] to [place].  Daughter, Nuzhat, reports she has unable to obtain her oxygen.  Patient does complain of loss of taste, confusion, headaches and generalized weakness.  In the ER, she was noted to be COVID 19 positive.  She is currently requiring 4 L of oxygen supplementation via nasal cannula.  She will be hospitalized for COVID-19, acute on chronic respiratory failure, oxygen arrangement.  I discussed with Shaan over the phone, she reports patient possibly needs a lead replacement of her pacemaker. Admitted to medicine service.       I discussed the plan of care with patient and ERP .    Review of Systems  Review of Systems   Constitutional:  Positive for malaise/fatigue.   Respiratory:  Positive for cough.    Neurological:  Positive for dizziness, weakness and headaches.       Past Medical History   has a past medical history of MAXIMILIANO (acute kidney injury) ([facility]) (5/15/2022), Benign essential hypertension, CAD (coronary artery disease), CHF (congestive heart failure) ([facility]), Congestive heart failure ([facility]), COPD, COPD (chronic obstructive pulmonary disease) ([facility]), COVID-19 (11/9/2023), Dilated cardiomyopathy ([facility]), History of cardiac  "catheterization, Hypertension, Mixed hyperlipidemia, Nicotine dependence, Nicotine dependence, and Type 2 diabetes mellitus with complication (HCC).    Surgical History   has a past surgical history that includes other cardiac surgery; appendectomy; and pr open fix inter/subtroch fx,implnt (Right, 7/6/2022).     Family History  family history includes Heart Attack in her maternal aunt; Heart Disease in her father, maternal aunt, maternal uncle, and mother.   Family history reviewed with patient. There is no family history that is pertinent to the chief complaint.     Social History   reports that she has been smoking cigarettes. She has a 20.0 pack-year smoking history. She has never used smokeless tobacco. She reports that she does not drink alcohol and does not use drugs.    Allergies  Allergies   Allergen Reactions    Codeine Rash     All over body      Fluticasone-Salmeterol Itching    Lisinopril Rash and Itching     On body      Sulfa Drugs Rash and Itching     On body      Jardiance [Empagliflozin]      \"Dizziness\" per pt       Medications  Prior to Admission Medications   Prescriptions Last Dose Informant Patient Reported? Taking?   Home Care Oxygen SUPPLY at N/A Patient Yes No   Sig: Inhale 3 L/min continuous. DME - [facility]   apixaban (ELIQUIS) 5mg Tab 11/8/2023 at PM Patient Yes Yes   Sig: Take 5 mg by mouth 2 times a day.   atorvastatin (LIPITOR) 10 MG Tab 11/8/2023 at PM Patient Yes No   Sig: Take 10 mg by mouth every day.   carvedilol (COREG) 25 MG Tab 11/8/2023 at PM Patient No No   Sig: TAKE ONE TABLET BY MOUTH TWICE DAILY WITH MEALS   Patient taking differently: Take 25 mg by mouth 2 times a day.   cyclobenzaprine (FLEXERIL) 10 mg Tab PRN at PRN Patient No No   Sig: Take 1 Tablet by mouth 3 times a day as needed for Muscle Spasms or Moderate Pain.   famotidine (PEPCID) 20 MG Tab 11/8/2023 at UNK Patient Yes No   Sig: Take 20 mg by mouth in the morning and 20 mg in the evening.   fluticasone " furoate-vilanterol (BREO ELLIPTA) 200-25 MCG/ACT AEROSOL POWDER, BREATH ACTIVATED 11/8/2023 at AM Patient Yes Yes   Sig: Inhale 1 Puff every day.   furosemide (LASIX) 20 MG Tab 11/8/2023 at [facility] Patient No No   Sig: Take 1 Tablet by mouth 2 times a day.   gabapentin (NEURONTIN) 300 MG Cap >14 DAYS at [facility] Patient No No   Sig: Take 2 Capsules by mouth at bedtime.   glipiZIDE (GLUCOTROL) 5 MG Tab 11/8/2023 at AM Patient Yes Yes   Sig: Take 5 mg by mouth every day.   insulin aspart (NOVOLOG FLEXPEN) 100 UNIT/ML injection PEN 11/8/2023 at [facility] Patient Yes No   Sig: Inject  under the skin 2 times a day. PER SLIDING SCALE:  2 units for level 150+, then additional 2 units for every 50 above.   losartan (COZAAR) 50 MG Tab 11/8/2023 at [facility] Patient No No   Sig: Take 1 Tab by mouth 2 Times a Day.   potassium chloride SA (KDUR) 20 MEQ Tab CR 11/8/2023 at [facility] Patient No No   Sig: Take 1 Tablet by mouth 2 times a day.   traMADol (ULTRAM) 50 MG Tab PRN at PRN Patient Yes No   Sig: Take 50 mg by mouth every 6 hours as needed for Mild Pain or Moderate Pain.      Facility-Administered Medications: None       Physical Exam  Temp:  [36.5 °C (97.7 °F)] 36.5 °C (97.7 °F)  Pulse:  [74-80] 78  Resp:  [16] 16  BP: ()/(57-76) 142/66  SpO2:  [92 %-100 %] 100 %  Blood Pressure : (!) 142/66   Temperature: 36.5 °C (97.7 °F)   Pulse: 78   Respiration: 16   Pulse Oximetry: 100 %       Physical Exam  Vitals and nursing note reviewed.   Constitutional:       Appearance: Normal appearance.   HENT:      Head: Normocephalic and atraumatic.      Right Ear: Tympanic membrane normal.      Left Ear: Tympanic membrane normal.      Nose: Nose normal.      Mouth/Throat:      Mouth: Mucous membranes are moist.      Pharynx: Oropharynx is clear.   Eyes:      Extraocular Movements: Extraocular movements intact.      Pupils: Pupils are equal, round, and reactive to light.   Cardiovascular:      Rate and Rhythm: Normal rate and regular rhythm.      Pulses:  "Normal pulses.      Heart sounds: Normal heart sounds.   Pulmonary:      Effort: Pulmonary effort is normal.      Breath sounds: Normal breath sounds.   Abdominal:      General: Bowel sounds are normal. There is no distension.      Palpations: Abdomen is soft. There is no mass.   Musculoskeletal:         General: Normal range of motion.      Cervical back: Neck supple.   Skin:     General: Skin is warm.      Capillary Refill: Capillary refill takes less than 2 seconds.   Neurological:      General: No focal deficit present.      Mental Status: She is alert and oriented to person, place, and time. Mental status is at baseline.   Psychiatric:         Mood and Affect: Mood normal.         Behavior: Behavior normal.         Laboratory:  Recent Labs     11/08/23 2329   WBC 7.2   RBC 3.60*   HEMOGLOBIN 11.0*   HEMATOCRIT 34.9*   MCV 96.9   MCH 30.6   MCHC 31.5*   RDW 44.3   PLATELETCT 237   MPV 9.0     Recent Labs     11/08/23 2329   SODIUM 136   POTASSIUM 4.0   CHLORIDE 98   CO2 24   GLUCOSE 139*   BUN 18   CREATININE 1.21   CALCIUM 9.2     Recent Labs     11/08/23  2329   ALTSGPT 13   ASTSGOT 22   ALKPHOSPHAT 109*   TBILIRUBIN 0.2   GLUCOSE 139*     Recent Labs     11/08/23 2329   APTT 31.9   INR 1.58*     No results for input(s): \"NTPROBNP\" in the last 72 hours.      Recent Labs     11/08/23 2329 11/09/23  0326   TROPONINT 35* 28*       Imaging:  CT-HEAD W/O   Final Result         1.  No acute intracranial abnormality is identified, there are nonspecific white matter changes, commonly associated with small vessel ischemic disease.  Associated mild cerebral atrophy is noted.   2.  Left maxillary and right frontal sinusitis changes   3.  Atherosclerosis.         DX-CHEST-PORTABLE (1 VIEW)   Final Result         1.  Hazy left lower lobe infiltrate.   2.  Right lower lobe nodular density, could represent summation of shadows at the scapular tip, pulmonary nodule not excluded. Recommend follow-up chest x-ray in 3 months " for repeat characterization.   3.  Atherosclerosis      Findings and/or recommendations of the examination where conveyed digitally using the [employer] system to, Darrell Davis, and message was electronically verified as Read on 11/9/2023 12:28 AM.          X-Ray:  I have personally reviewed the images and compared with prior images.    Assessment/Plan:  Justification for Admission Status  I anticipate this patient will require at least two midnights for appropriate medical management, necessitating inpatient admission because acute on chronic respiratory failure secondary to COVID-19, patient will need oxygen arrangement as she recently relocated from [place].    Patient will need a Telemetry bed on MEDICAL service .  The need is secondary to see above.    * COVID-19- (present on admission)  Assessment & Plan  Requiring more oxygen than baseline  Decadron 6 mg daily for total of 10 doses  Procalcitonin negative.  Hold antibiotics  Continue with enhanced precautions      AF (atrial fibrillation) (HCC)  Assessment & Plan  Continue with Eliquis 5 mg twice daily and carvedilol 25 mg twice daily    Acute on chronic respiratory failure with hypoxia (HCC)- (present on admission)  Assessment & Plan  Acute on chronic likely secondary to COVID-19  Oxygen per protocol  Case management consult in a.m. to arrange oxygen    Type 2 diabetes mellitus with complication (HCC)- (present on admission)  Assessment & Plan  Continue sliding scale with Accu-Cheks and hypoglycemia protocol    Biventricular ICD (implantable cardioverter-defibrillator) in place- (present on admission)  Assessment & Plan  Daughter reports she has had conversations with MDs that she may need a lead replacement  Discussed with cardiology in a.m. here in am     Tobacco dependence- (present on admission)  Assessment & Plan  Tobacco cessation education provided for more than 4 minutes, discussed options of nicotine patch, medical treatment with wellbutrin and  chantix. Discussed the risks of smoking including increased risk of heart disease, stroke, cancer and COPD  Nicotine patch protocol     Hyperlipidemia- (present on admission)  Assessment & Plan  Statin    CHF (congestive heart failure) (HCC)- (present on admission)  Assessment & Plan  Continue with Coreg, losartan, Lasix, statin    COPD (chronic obstructive pulmonary disease) (HCC)- (present on admission)  Assessment & Plan  Not in exacerbation  Continue with Dulera  Oxygen per protocol    Benign essential hypertension- (present on admission)  Assessment & Plan  Continue with carvedilol 25 mg twice daily, losartan 50 mg twice daily        VTE prophylaxis: SCDs/TEDs and therapeutic anticoagulation with eliquis

## 2023-11-09 NOTE — ASSESSMENT & PLAN NOTE
Requiring more oxygen than baseline  Decadron 6 mg daily for total of 10 doses  Procalcitonin negative.  Hold antibiotics  Continue with enhanced precautions

## 2023-11-09 NOTE — ED NOTES
Pt medicated per MAR, education provided.  Pt verbalized understanding. Pt provided snacks and juice per request.  Pt denies other needs at this time.

## 2023-11-09 NOTE — DISCHARGE PLANNING
Case Management Note:    Called [facility] for update. Hilda is still working on oxygen for patient. RNCM to continue to monitor.

## 2023-11-09 NOTE — DISCHARGE INSTRUCTIONS
Discharge Instructions per Ambrocio Balbuena M.D.    You were hospitalized for low oxygen (hypoxia) and COVID. You were started on steroid (dexamethasone) which helps people survive COVID. Home oxygen was coordinated for you.    DIET: Regular    ACTIVITY: Regular    DIAGNOSIS: Acute on Chronic Hypoxia due to COVID19    Return to ER if you faint for any reason, develop bleeding in your bowels or cough, or develop sudden chest pain or shortness of breath.

## 2023-11-09 NOTE — ED NOTES
Pt's daughter reports that the pt has had multiple falls over the last year. Pt has also had multiple instances of altered mental status over the past few weeks since she returned to [place] from [place] where pt was living with other daughter. Pt also reports that she requires 3L O2 at home but has not suzan able to get her O2 since moving here.

## 2023-11-09 NOTE — ED PROVIDER NOTES
"ED Provider Note    CHIEF COMPLAINT  Chief Complaint   Patient presents with    GLF    Weakness     Patient BIBA for a witnessed GLF. -headstrike, -LOC. Patient reports feeling weak, legs trembling, then falling to the ground. EMS and daughter report confusion and A&Ox2 at home. Patient arrives to triage in wheelchair, and A&Ox4       EXTERNAL RECORDS REVIEWED  External ED Note outpatient ER note from 9/12/2023 with the patient was seen for 5 days of ongoing right upper quadrant and right lower chest discomfort.  This had been escalating at that time, has a history of COPD and nonischemic CHF.  Other history includes diabetes, hypertension, nonischemic cardiomyopathy with a pacemaker and paroxysmal atrial fib.  The work-up at this facility showed normal labs, no evidence of MI with no dynamic EKG changes and no troponin elevation.  CT scan of both the chest and abdomen showed no evidence of PE, pneumonia or acute infectious etiology in the abdomen.    HPI/ROS  LIMITATION TO HISTORY   Select: : None  OUTSIDE HISTORIAN(S):  Family daughter at bedside to provide additional recent information    Mckenna Sewell is a 68 y.o. female who presents to the emergency room accompanied by her daughter for evaluation of multiple falls, weakness, chills and not acting like herself.  Daughter says that she has come here for several weeks since relocating from her other daughter's household in [place].  In this time she has had frequent episodes of falls that she describes as occurring because she had \"weakness throughout my body\" that is oftentimes associated with some jerky jerky movements of her upper and lower extremities that she does not lose consciousness.  She reports that she has had several medication changes and believes this started when this occurred.  She has had several falls within the last week and thinks that she may have struck her head on one of them, she has had occasional intermittent headaches that " these have resolved.  She denies chest pain with these events.  Denies palpitations.  Daughter endorses that she has just not been acting like herself, is incredibly forgetful, was supposed to be on oxygen for her chronic COPD at 3 L however when she moved out she was not able to get this established.  Up until several months ago she was fairly independent however since she had a ground-level fall and a hip replacement she has been much more debilitated and more of the symptoms have manifested.    PAST MEDICAL HISTORY   has a past medical history of MAXIMILIANO (acute kidney injury) (HCC) (5/15/2022), Benign essential hypertension, CAD (coronary artery disease), CHF (congestive heart failure) (HCC), Congestive heart failure (HCC), COPD, COPD (chronic obstructive pulmonary disease) ([facility]), Dilated cardiomyopathy (HCC), History of cardiac catheterization, Hypertension, Mixed hyperlipidemia, Nicotine dependence, Nicotine dependence, and Type 2 diabetes mellitus with complication ([facility]).    SURGICAL HISTORY   has a past surgical history that includes other cardiac surgery; appendectomy; and open fix inter/subtroch fx,implnt (Right, 7/6/2022).    FAMILY HISTORY  Family History   Problem Relation Age of Onset    Heart Disease Father     Heart Disease Mother     Heart Disease Maternal Aunt     Heart Attack Maternal Aunt     Heart Disease Maternal Uncle        SOCIAL HISTORY  Social History     Tobacco Use    Smoking status: Every Day     Current packs/day: 0.50     Average packs/day: 0.5 packs/day for 40.0 years (20.0 ttl pk-yrs)     Types: Cigarettes    Smokeless tobacco: Never    Tobacco comments:     1/2 ppd   Vaping Use    Vaping Use: Never used   Substance and Sexual Activity    Alcohol use: No    Drug use: No    Sexual activity: Not on file       CURRENT MEDICATIONS  Home Medications       Reviewed by Jaxson Robles R.N. (Registered Nurse) on 11/08/23 at 2230  Med List Status: Not Addressed     Medication Last Dose Status  "  albuterol (VENTOLIN OR PROVENTIL) 108 (90 BASE) MCG/ACT Aero Soln inhalation aerosol  Active   aspirin EC (ECOTRIN) 81 MG TBEC  Active   atorvastatin (LIPITOR) 10 MG Tab  Active   carvedilol (COREG) 25 MG Tab  Active   cyclobenzaprine (FLEXERIL) 10 mg Tab  Active   dapagliflozin propanediol (FARXIGA) 10 MG Tab  Active   Empagliflozin (JARDIANCE) 25 MG Tab  Active   famotidine (PEPCID) 20 MG Tab  Active   furosemide (LASIX) 20 MG Tab  Active   gabapentin (NEURONTIN) 300 MG Cap  Active   Home Care Oxygen  Active   insulin aspart (NOVOLOG FLEXPEN) 100 UNIT/ML injection PEN  Active   Ipratropium-Albuterol (COMBIVENT INH)  Active   linagliptin (TRADJENTA) 5 MG Tab tablet  Active   losartan (COZAAR) 50 MG Tab  Active   polyethylene glycol/lytes (MIRALAX) 17 g Pack  Active   potassium chloride SA (KDUR) 20 MEQ Tab CR  Active   simvastatin (ZOCOR) 20 MG Tab  Active   spironolactone (ALDACTONE) 50 MG Tab  Active   tamsulosin (FLOMAX) 0.4 MG capsule  Active   tiotropium (SPIRIVA RESPIMAT) 2.5 mcg/Act Aero Soln  Active   traMADol (ULTRAM) 50 MG Tab  Active                  ALLERGIES  Allergies   Allergen Reactions    Codeine Rash     All over body      Fluticasone-Salmeterol Itching    Lisinopril Rash and Itching     On body      Sulfa Drugs Rash and Itching     On body       PHYSICAL EXAM  VITAL SIGNS: /61   Pulse 80   Temp 36.5 °C (97.7 °F) (Temporal)   Resp 16   Ht 1.626 m (5' 4\")   Wt 65.3 kg (144 lb)   SpO2 96%   BMI 24.72 kg/m²    Genl: chronically ill appearing F, [place], speaking clearly, appears in mild distress   Head: NC/AT   ENT: Mucous membranes dry, posterior pharynx clear, uvula midline, nares patent bilaterally   Eyes: Normal sclera, pupils equal round reactive to light  Neck: Supple, FROM, no LAD appreciated   Pulmonary: Lungs are clear to auscultation bilaterally, no wheezes or rhonchi  Chest: No TTP  CV:  RRR, no murmur appreciated, pulses 2+ in both upper and lower " extremities,  Abdomen: soft, NT/ND; no rebound/guarding, no masses palpated, no HSM   : no CVA tenderness.  Mild suprapubic discomfort.  Inconsistent reexamination  Musculoskeletal: Pain free ROM of the neck. Moving upper and lower extremities in spontaneous and coordinated fashion  Neuro: A&Ox4 (person, place, time, situation), speech fluent, gait steady, no focal deficits appreciated  Skin: No rash or lesions.  No pallor or jaundice.  No cyanosis.  Warm and dry.     DIAGNOSTIC STUDIES / PROCEDURES  EKG  I have independently interpreted this EKG  Results for orders placed or performed during the hospital encounter of 23   EKG (NOW)   Result Value Ref Range    Report       [facility] Emergency Dept.    Test Date:  2023  Pt Name:    RUPESH WONG                 Department: ER  MRN:        [id number]                      Room:  Gender:     Female                       Technician: [id number]  :        1955                   Requested By:ER TRIAGE PROTOCOL  Order #:    [id number]                    Reading MD:    Measurements  Intervals                                Axis  Rate:       80                           P:          39  ME:         168                          QRS:        154  QRSD:       100                          T:          151  QT:         396  QTc:        457    Interpretive Statements  Atrial-sensed ventricular-paced rhythm, rate of 80, no acute ischemia or infarction noted.  Limitied interpretation due to pacing  Compared to ECG 2022 07:17:15  No significant changes       LABS  Labs Reviewed   CBC WITH DIFFERENTIAL - Abnormal; Notable for the following components:       Result Value    RBC 3.60 (*)     Hemoglobin 11.0 (*)     Hematocrit 34.9 (*)     MCHC 31.5 (*)     Lymphocytes 17.80 (*)     Monocytes 14.70 (*)     Monos (Absolute) 1.06 (*)     All other components within normal limits    Narrative:     Biotin intake of greater than 5 mg per day may interfere  with  troponin levels, causing false low values.  Indicate which anticoagulants the patient is on:->ARGATROBAN   COMP METABOLIC PANEL - Abnormal; Notable for the following components:    Glucose 139 (*)     Alkaline Phosphatase 109 (*)     Total Protein 8.5 (*)     Globulin 4.4 (*)     All other components within normal limits    Narrative:     Biotin intake of greater than 5 mg per day may interfere with  troponin levels, causing false low values.  Indicate which anticoagulants the patient is on:->ARGATROBAN   TROPONIN - Abnormal; Notable for the following components:    Troponin T 35 (*)     All other components within normal limits    Narrative:     Biotin intake of greater than 5 mg per day may interfere with  troponin levels, causing false low values.  Indicate which anticoagulants the patient is on:->ARGATROBAN   PROTHROMBIN TIME - Abnormal; Notable for the following components:    PT 19.1 (*)     INR 1.58 (*)     All other components within normal limits    Narrative:     Biotin intake of greater than 5 mg per day may interfere with  troponin levels, causing false low values.  Indicate which anticoagulants the patient is on:->ARGATROBAN   ESTIMATED GFR - Abnormal; Notable for the following components:    GFR (CKD-EPI) 48 (*)     All other components within normal limits    Narrative:     Biotin intake of greater than 5 mg per day may interfere with  troponin levels, causing false low values.  Indicate which anticoagulants the patient is on:->ARGATROBAN   POC COV-2, FLU A/B, RSV BY PCR - Abnormal; Notable for the following components:    POC SARS-CoV-2, PCR DETECTED (*)     All other components within normal limits   COV-2, FLU A/B, AND RSV BY PCR (CEPHEID)   APTT    Narrative:     Biotin intake of greater than 5 mg per day may interfere with  troponin levels, causing false low values.  Indicate which anticoagulants the patient is on:->ARGATROBAN   URINALYSIS,CULTURE IF INDICATED       RADIOLOGY  I have  independently interpreted the diagnostic imaging associated with this visit and am waiting the final reading from the radiologist.   My preliminary interpretation is as follows: No intracranial bleed, intracranial mass, no obvious infectious focal infiltrates or concerning cardiomegaly.  Radiologist interpretation:   CT-HEAD W/O   Final Result         1.  No acute intracranial abnormality is identified, there are nonspecific white matter changes, commonly associated with small vessel ischemic disease.  Associated mild cerebral atrophy is noted.   2.  Left maxillary and right frontal sinusitis changes   3.  Atherosclerosis.         DX-CHEST-PORTABLE (1 VIEW)   Final Result         1.  Hazy left lower lobe infiltrate.   2.  Right lower lobe nodular density, could represent summation of shadows at the scapular tip, pulmonary nodule not excluded. Recommend follow-up chest x-ray in 3 months for repeat characterization.   3.  Atherosclerosis      Findings and/or recommendations of the examination where conveyed digitally using the [employer] system to, Darrell Davis, and message was electronically verified as Read on 11/9/2023 12:28 AM.        COURSE & MEDICAL DECISION MAKING    ED Observation Status? No; Patient does not meet criteria for ED Observation.     INITIAL ASSESSMENT, COURSE AND PLAN  Subdural hematoma, UTI, electrolyte derangement, dehydration, hypoxia, COPD, ACS unlikely, arrhythmia unlikely, viral illness, anemia    Care Narrative: Seen and evaluated for symptoms as described above.  The patient presents to the emergency room for feeling unwell, having some repeated falls, not having oxygen at home for her chronic hypoxia due to COPD.  She has no infiltrates or restricted movements on clinical exam.  Do not believe she needs a breathing treatment and at this time chest x-ray was obtained that showed the possibility of a hazy left lower lobe infiltrate.  Her COVID test is positive, she has no leukocytosis, stable  chronic anemia, no gross electrolyte derangements.      Judicious fluids are used for resuscitation in setting of COVID and she does not develop any hemodynamic instability, febrile illness or concerning tachycardia.  PE is low likelihood, she has a troponin bump that without any ischemic changes on EKG is likely result of underlying illness or concurrent GFR decreased because of dehydration.    Patient has had some recent falls, CT scan at this time shows no evidence of acute intracranial abnormalities, no evidence of mass.    Living situation has her daughter being very concerned about her ability to safely care as she used to be ambulatory and somewhat self-sufficient there is concerns about her falling while she is at work.  At this time she will necessitate admission for her oxygen requirements, COVID status, and ongoing weakness.  She is only necessitate poorly to treat nasal cannula, discussed with the hospitalist will admit the patient in guarded condition    HYDRATION: Based on the patient's presentation of Dehydration the patient was given IV fluids. IV Hydration was used because oral hydration was not adequate alone. Upon recheck following hydration, the patient was improving.    FINAL DIAGNOSIS  1. Weakness    2. Fall, initial encounter    3. Hypoxia    4. Chronic obstructive pulmonary disease, unspecified COPD type (HCC)    5. COVID       Electronically signed by: Darrell Davis M.D., 11/8/2023 11:32 PM

## 2023-11-09 NOTE — ED TRIAGE NOTES
"Chief Complaint   Patient presents with    GLF    Weakness     Patient BIBA for a witnessed GLF. -headstrike, -LOC. Patient reports feeling weak, legs trembling, then falling to the ground. EMS and daughter report confusion and A&Ox2 at home. Patient arrives to triage in wheelchair, and A&Ox4       Pt is alert and oriented, speaking in full sentences, follows commands and responds appropriately to questions. Resperations are even and unlabored.      Pt placed in lobby. Pt educated on triage process. Pt encouraged to alert staff for any changes.     Patient and staff wearing appropriate PPE.    BP 98/57   Pulse 80   Temp 36.5 °C (97.7 °F) (Temporal)   Resp 16   Ht 1.626 m (5' 4\")   Wt 65.3 kg (144 lb)   SpO2 92%    "

## 2023-11-09 NOTE — ED NOTES
Pt returned from CT.  Infusion started. Covid sample collected, and ran. Pt denies needs at this time.

## 2023-11-09 NOTE — FACE TO FACE
"Face to Face Note  -  Durable Medical Equipment    Ambrocio Balbuena M.D. - NPI: [id number]  I certify that this patient is under my care and that they had a durable medical equipment(DME)face to face encounter by myself that meets the physician DME face-to-face encounter requirements with this patient on:    Date of encounter:   Patient:                    MRN:                       YOB: 2025  Mckenna Sewell  [id number]  1955     The encounter with the patient was in whole, or in part, for the following medical condition, which is the primary reason for durable medical equipment:  COPD and Other - COVID19    I certify that, based on my findings, the following durable medical equipment is medically necessary:    Oxygen   HOME O2 Saturation Measurements:(Values must be present for Home Oxygen orders)  Room air sat at rest: 90  Room air sat with amb: 83  With liters of O2: 92, O2 sat at rest with O2: 4  With Liters of O2: 4, O2 sat with amb with O2 : 94  Is the patient mobile?: Yes  If patient feels more short of breath, they can go up to 6 liters per minute and contact healthcare provider.    Supporting Symptoms: The patient requires supplemental oxygen, as the following interventions have been tried with limited or no improvement: \"Bronchodilators and/or steroid inhalers, \"Oral and/or IV steroids, \"Ambulation with oximetry, and \"Incentive spirometry.    My Clinical findings support the need for the above equipment due to:  Hypoxia  "

## 2023-11-09 NOTE — ED NOTES
Pt to R8. Pt is A&Ox4 and awake in bed. Pt placed on cardiac monitor, pulse ox, and automatic BP. VSS, saturating above 95% on RA, SR in the 70s. Call light within reach and chart up for ERP.

## 2023-11-09 NOTE — DISCHARGE PLANNING
Hospitalist requests DC with home O2.    [facility] called.  Patient was on service in [place]. Same insurance information. They should be able to deliver 1-2 hours in ER.    Information faxed to [facility] with receipt confirmation.    Met with patient at bedside, she agrees to request home O2 from [facility] again, updated on timing and states her daughter will pick halle up and transport her home once equipment is delivered. She signs Choice form and requests ice cream.    Bedside RN notified of above.

## 2023-11-09 NOTE — ED NOTES
Bedside report given to Yamilka GUILLAUME. Pt aware of pending discharge and O2 delivery in around 2hr.

## 2023-11-09 NOTE — ASSESSMENT & PLAN NOTE
Tobacco cessation education provided for more than 4 minutes, discussed options of nicotine patch, medical treatment with wellbutrin and chantix. Discussed the risks of smoking including increased risk of heart disease, stroke, cancer and COPD  Nicotine patch protocol

## 2023-11-09 NOTE — DISCHARGE SUMMARY
Discharge Summary    CHIEF COMPLAINT ON ADMISSION  Chief Complaint   Patient presents with    GLF    Weakness     Patient BIBA for a witnessed GLF. -headstrike, -LOC. Patient reports feeling weak, legs trembling, then falling to the ground. EMS and daughter report confusion and A&Ox2 at home. Patient arrives to triage in wheelchair, and A&Ox4       Reason for Admission  Hypoxia due to COVID     Admission Date  11/8/2023    CODE STATUS  Full Code    HPI & HOSPITAL COURSE  This is a 68 y.o. female with reported COPD at 2-3 L/min baseline, HTN, tobacco use here with weakness and fall.    She reported recent move from [place] to [place] without coordination of oxygen yet. She had a mechanical fall without prodrome and feels weak with headache and loss of taste. CTH was negative. She was found to be hypoxic to 3-4 L/min. CXR demonstrated LLL infiltrate for which COVID PCR was positive. EKG demonstrated ASVP rhythm. Troponin was negative for ischemia. Procalcitonin and Ddimer <1000 were not indicative of bacterial nor PE contribution (by YEARS criteria). She was initiated on dexamethasone 6 mg for a 10-day course. Due to prior GERD her famotidine was increased to omeprazole for gastric prophylaxis. She reported improved symptoms with oxygen and ambulated with standby assistance with exertional hypoxia requiring 4 L/min. Home oxygen was coordinated and she was discharge home for self-isolation and recovery.    Therefore, she is discharged in fair and stable condition to home with close outpatient follow-up.    The patient met 2-midnight criteria for an inpatient stay at the time of discharge.    Discharge Date  11/9/2023    FOLLOW UP ITEMS POST DISCHARGE    COVID hypoxia - complete 10-day course of steroids    COPD - home oxygen and smoking cessation    DISCHARGE DIAGNOSES  Principal Problem:    COVID-19 (POA: Yes)  Active Problems:    Benign essential hypertension (Chronic) (POA: Yes)    COPD (chronic obstructive  pulmonary disease) (HCC) (Chronic) (POA: Yes)    CHF (congestive heart failure) (HCC) (Chronic) (POA: Yes)      Overview: 11/12/08: 10/29/2008      Increased diuresis for chronic heart failure.          Hyperlipidemia (POA: Yes)    Tobacco dependence (POA: Yes)    Biventricular ICD (implantable cardioverter-defibrillator) in place (Chronic) (POA: Yes)      Overview: [employer] July 2014 at [facility] on a trial with a new quadripolar       lead so follows there    Type 2 diabetes mellitus with complication (HCC) (Chronic) (POA: Yes)    Acute on chronic respiratory failure with hypoxia (HCC) (POA: Yes)    AF (atrial fibrillation) (HCC) (POA: Yes)  Resolved Problems:    * No resolved hospital problems. *      FOLLOW UP  No future appointments.  Willis Gomez M.D.  [address]  [facility] [address]  [phone]    Schedule an appointment as soon as possible for a visit in 1 month(s)  after-hospital follow up      MEDICATIONS ON DISCHARGE     Medication List        START taking these medications        Instructions   dexamethasone 6 MG Tabs  Start taking on: November 10, 2023  Commonly known as: Decadron   Take 1 Tablet by mouth every day for 8 days.  Dose: 6 mg     omeprazole 20 MG delayed-release capsule  Start taking on: November 10, 2023  Commonly known as: PriLOSEC   Take 1 Capsule by mouth every day.  Dose: 20 mg            CHANGE how you take these medications        Instructions   carvedilol 25 MG Tabs  What changed: when to take this  Commonly known as: Coreg   TAKE ONE TABLET BY MOUTH TWICE DAILY WITH MEALS            CONTINUE taking these medications        Instructions   atorvastatin 10 MG Tabs  Commonly known as: Lipitor   Take 10 mg by mouth every day.  Dose: 10 mg     Breo Ellipta 200-25 MCG/ACT Aepb  Generic drug: fluticasone furoate-vilanterol   Inhale 1 Puff every day.  Dose: 1 Puff     cyclobenzaprine 10 mg Tabs  Commonly known as: Flexeril   Take 1 Tablet by mouth 3 times a day as  "needed for Muscle Spasms or Moderate Pain.  Dose: 10 mg     Eliquis 5mg Tabs  Generic drug: apixaban   Take 5 mg by mouth 2 times a day.  Dose: 5 mg     furosemide 20 MG Tabs  Commonly known as: Lasix   Take 1 Tablet by mouth 2 times a day.  Dose: 20 mg     gabapentin 300 MG Caps  Commonly known as: Neurontin   Take 2 Capsules by mouth at bedtime.  Dose: 600 mg     glipiZIDE 5 MG Tabs  Commonly known as: Glucotrol   Take 5 mg by mouth every day.  Dose: 5 mg     Home Care Oxygen   Inhale 3 L/min continuous. DME - [facility]  Dose: 3 L/min     losartan 50 MG Tabs  Commonly known as: Cozaar   Take 1 Tab by mouth 2 Times a Day.  Dose: 50 mg     NovoLOG FlexPen 100 UNIT/ML injection PEN  Generic drug: insulin aspart   Inject  under the skin 2 times a day. PER SLIDING SCALE:  2 units for level 150+, then additional 2 units for every 50 above.     potassium chloride SA 20 MEQ Tbcr  Commonly known as: Kdur   Take 1 Tablet by mouth 2 times a day.  Dose: 20 mEq     traMADol 50 MG Tabs  Commonly known as: Ultram   Take 50 mg by mouth every 6 hours as needed for Mild Pain or Moderate Pain.  Dose: 50 mg            STOP taking these medications      famotidine 20 MG Tabs  Commonly known as: Pepcid              Allergies  Allergies   Allergen Reactions    Codeine Rash     All over body      Fluticasone-Salmeterol Itching    Lisinopril Rash and Itching     On body      Sulfa Drugs Rash and Itching     On body      Jardiance [Empagliflozin]      \"Dizziness\" per pt       DIET  Orders Placed This Encounter   Procedures    Diet Order Diet: Regular     Standing Status:   Standing     Number of Occurrences:   1     Order Specific Question:   Diet:     Answer:   Regular [1]       ACTIVITY  As tolerated.  Weight bearing as tolerated    CONSULTATIONS  None    PROCEDURES  None    LABORATORY  Lab Results   Component Value Date    SODIUM 136 11/08/2023    POTASSIUM 4.0 11/08/2023    CHLORIDE 98 11/08/2023    CO2 24 11/08/2023    GLUCOSE 139 (H) " 11/08/2023    BUN 18 11/08/2023    CREATININE 1.21 11/08/2023    CREATININE 0.9 10/30/2008        Lab Results   Component Value Date    WBC 7.2 11/08/2023    HEMOGLOBIN 11.0 (L) 11/08/2023    HEMATOCRIT 34.9 (L) 11/08/2023    PLATELETCT 237 11/08/2023        Total time of the discharge process exceeds 50 minutes.

## 2023-11-09 NOTE — PROGRESS NOTES
Patient c/o pain to ble - flexiril given. Discussed AVS discharge info - patient awaiting home o2 to be delivered before dc.

## 2023-11-09 NOTE — ASSESSMENT & PLAN NOTE
Daughter reports she has had conversations with MDs that she may need a lead replacement  Discussed with cardiology in a.m. here in am

## 2023-11-10 NOTE — ED NOTES
Pt ambulated to BR on 3 L NC.  Requesting tramadol for 8/10 hip pain.  Maintain Oc sat = 96% post ambulation on 3 L NC.

## 2023-11-10 NOTE — ED NOTES
This RN calling [facility] and verified the oxygen concentrator and tanks will be delivered within 2 hours by Jose C bolton.    This RN spoke with Michelle of [facility].

## 2023-11-10 NOTE — ED NOTES
Pt discharged home, discharge and follow up care instructions given, prescriptions sent to the pharmacy of choice, pt verbalized understanding. IV removed, pt escorted out of ED via WC with daughter.

## 2023-11-15 ENCOUNTER — HOSPITAL ENCOUNTER (EMERGENCY)
Facility: MEDICAL CENTER | Age: 68
End: 2023-11-15
Attending: EMERGENCY MEDICINE
Payer: MEDICARE

## 2023-11-15 ENCOUNTER — APPOINTMENT (OUTPATIENT)
Dept: RADIOLOGY | Facility: MEDICAL CENTER | Age: 68
End: 2023-11-15
Attending: EMERGENCY MEDICINE
Payer: MEDICARE

## 2023-11-15 VITALS
HEIGHT: 64 IN | RESPIRATION RATE: 54 BRPM | OXYGEN SATURATION: 94 % | DIASTOLIC BLOOD PRESSURE: 74 MMHG | WEIGHT: 144 LBS | BODY MASS INDEX: 24.59 KG/M2 | HEART RATE: 71 BPM | SYSTOLIC BLOOD PRESSURE: 140 MMHG | TEMPERATURE: 96.8 F

## 2023-11-15 DIAGNOSIS — R05.1 ACUTE COUGH: ICD-10-CM

## 2023-11-15 DIAGNOSIS — U07.1 COVID-19: ICD-10-CM

## 2023-11-15 DIAGNOSIS — S22.41XD CLOSED FRACTURE OF MULTIPLE RIBS OF RIGHT SIDE WITH ROUTINE HEALING, SUBSEQUENT ENCOUNTER: ICD-10-CM

## 2023-11-15 LAB
ALBUMIN SERPL BCP-MCNC: 3.4 G/DL (ref 3.2–4.9)
ALBUMIN/GLOB SERPL: 1.1 G/DL
ALP SERPL-CCNC: 93 U/L (ref 30–99)
ALT SERPL-CCNC: 15 U/L (ref 2–50)
ANION GAP SERPL CALC-SCNC: 11 MMOL/L (ref 7–16)
AST SERPL-CCNC: 16 U/L (ref 12–45)
BASOPHILS # BLD AUTO: 0.1 % (ref 0–1.8)
BASOPHILS # BLD: 0.01 K/UL (ref 0–0.12)
BILIRUB SERPL-MCNC: 0.2 MG/DL (ref 0.1–1.5)
BUN SERPL-MCNC: 9 MG/DL (ref 8–22)
CALCIUM ALBUM COR SERPL-MCNC: 9 MG/DL (ref 8.5–10.5)
CALCIUM SERPL-MCNC: 8.5 MG/DL (ref 8.4–10.2)
CHLORIDE SERPL-SCNC: 103 MMOL/L (ref 96–112)
CO2 SERPL-SCNC: 22 MMOL/L (ref 20–33)
CREAT SERPL-MCNC: 0.65 MG/DL (ref 0.5–1.4)
CRP SERPL HS-MCNC: <0.3 MG/DL (ref 0–0.75)
D DIMER PPP IA.FEU-MCNC: 0.83 UG/ML (FEU) (ref 0–0.5)
EKG IMPRESSION: NORMAL
EOSINOPHIL # BLD AUTO: 0.11 K/UL (ref 0–0.51)
EOSINOPHIL NFR BLD: 1.2 % (ref 0–6.9)
ERYTHROCYTE [DISTWIDTH] IN BLOOD BY AUTOMATED COUNT: 43.7 FL (ref 35.9–50)
GFR SERPLBLD CREATININE-BSD FMLA CKD-EPI: 95 ML/MIN/1.73 M 2
GLOBULIN SER CALC-MCNC: 3.2 G/DL (ref 1.9–3.5)
GLUCOSE SERPL-MCNC: 183 MG/DL (ref 65–99)
HCT VFR BLD AUTO: 27.5 % (ref 37–47)
HGB BLD-MCNC: 8.6 G/DL (ref 12–16)
IMM GRANULOCYTES # BLD AUTO: 0.06 K/UL (ref 0–0.11)
IMM GRANULOCYTES NFR BLD AUTO: 0.7 % (ref 0–0.9)
LACTATE SERPL-SCNC: 1.3 MMOL/L (ref 0.5–2)
LIPASE SERPL-CCNC: 49 U/L (ref 11–82)
LYMPHOCYTES # BLD AUTO: 1.25 K/UL (ref 1–4.8)
LYMPHOCYTES NFR BLD: 13.8 % (ref 22–41)
MAGNESIUM SERPL-MCNC: 2 MG/DL (ref 1.5–2.5)
MCH RBC QN AUTO: 30 PG (ref 27–33)
MCHC RBC AUTO-ENTMCNC: 31.3 G/DL (ref 32.2–35.5)
MCV RBC AUTO: 95.8 FL (ref 81.4–97.8)
MONOCYTES # BLD AUTO: 0.65 K/UL (ref 0–0.85)
MONOCYTES NFR BLD AUTO: 7.2 % (ref 0–13.4)
NEUTROPHILS # BLD AUTO: 6.98 K/UL (ref 1.82–7.42)
NEUTROPHILS NFR BLD: 77 % (ref 44–72)
NRBC # BLD AUTO: 0 K/UL
NRBC BLD-RTO: 0 /100 WBC (ref 0–0.2)
NT-PROBNP SERPL IA-MCNC: 2824 PG/ML (ref 0–125)
PLATELET # BLD AUTO: 329 K/UL (ref 164–446)
PMV BLD AUTO: 9.3 FL (ref 9–12.9)
POTASSIUM SERPL-SCNC: 4.2 MMOL/L (ref 3.6–5.5)
PROCALCITONIN SERPL-MCNC: 0.04 NG/ML
PROT SERPL-MCNC: 6.6 G/DL (ref 6–8.2)
RBC # BLD AUTO: 2.87 M/UL (ref 4.2–5.4)
SODIUM SERPL-SCNC: 136 MMOL/L (ref 135–145)
TROPONIN T SERPL-MCNC: 24 NG/L (ref 6–19)
WBC # BLD AUTO: 9.1 K/UL (ref 4.8–10.8)

## 2023-11-15 PROCEDURE — 86140 C-REACTIVE PROTEIN: CPT

## 2023-11-15 PROCEDURE — 83735 ASSAY OF MAGNESIUM: CPT

## 2023-11-15 PROCEDURE — 71045 X-RAY EXAM CHEST 1 VIEW: CPT

## 2023-11-15 PROCEDURE — 83690 ASSAY OF LIPASE: CPT

## 2023-11-15 PROCEDURE — 93005 ELECTROCARDIOGRAM TRACING: CPT | Performed by: EMERGENCY MEDICINE

## 2023-11-15 PROCEDURE — 700117 HCHG RX CONTRAST REV CODE 255: Performed by: EMERGENCY MEDICINE

## 2023-11-15 PROCEDURE — 85379 FIBRIN DEGRADATION QUANT: CPT

## 2023-11-15 PROCEDURE — 84145 PROCALCITONIN (PCT): CPT

## 2023-11-15 PROCEDURE — 36415 COLL VENOUS BLD VENIPUNCTURE: CPT

## 2023-11-15 PROCEDURE — 83605 ASSAY OF LACTIC ACID: CPT

## 2023-11-15 PROCEDURE — 99284 EMERGENCY DEPT VISIT MOD MDM: CPT

## 2023-11-15 PROCEDURE — 71275 CT ANGIOGRAPHY CHEST: CPT

## 2023-11-15 PROCEDURE — 80053 COMPREHEN METABOLIC PANEL: CPT

## 2023-11-15 PROCEDURE — 83880 ASSAY OF NATRIURETIC PEPTIDE: CPT

## 2023-11-15 PROCEDURE — 85025 COMPLETE CBC W/AUTO DIFF WBC: CPT

## 2023-11-15 PROCEDURE — 700101 HCHG RX REV CODE 250: Performed by: EMERGENCY MEDICINE

## 2023-11-15 PROCEDURE — 87040 BLOOD CULTURE FOR BACTERIA: CPT

## 2023-11-15 PROCEDURE — 84484 ASSAY OF TROPONIN QUANT: CPT

## 2023-11-15 RX ORDER — HYDROCODONE BITARTRATE AND ACETAMINOPHEN 5; 325 MG/1; MG/1
1 TABLET ORAL EVERY 8 HOURS PRN
Qty: 12 TABLET | Refills: 0 | Status: SHIPPED | OUTPATIENT
Start: 2023-11-15 | End: 2023-11-19

## 2023-11-15 RX ORDER — LIDOCAINE 50 MG/G
1 PATCH TOPICAL EVERY 24 HOURS
Qty: 10 PATCH | Refills: 0 | Status: SHIPPED | OUTPATIENT
Start: 2023-11-15

## 2023-11-15 RX ORDER — ACETAMINOPHEN 500 MG
500-1000 TABLET ORAL EVERY 6 HOURS PRN
COMMUNITY

## 2023-11-15 RX ORDER — LINAGLIPTIN 5 MG/1
5 TABLET, FILM COATED ORAL EVERY EVENING
COMMUNITY

## 2023-11-15 RX ORDER — LIDOCAINE 50 MG/G
1 PATCH TOPICAL ONCE
Status: DISCONTINUED | OUTPATIENT
Start: 2023-11-15 | End: 2023-11-15 | Stop reason: HOSPADM

## 2023-11-15 RX ADMIN — LIDOCAINE 1 PATCH: 50 PATCH TOPICAL at 14:29

## 2023-11-15 RX ADMIN — IOHEXOL 50 ML: 350 INJECTION, SOLUTION INTRAVENOUS at 13:28

## 2023-11-15 ASSESSMENT — FIBROSIS 4 INDEX: FIB4 SCORE: 1.8

## 2023-11-15 NOTE — ED PROVIDER NOTES
ED Provider Note    CHIEF COMPLAINT  Chief Complaint   Patient presents with    Cough     Since Dx of COVID last week     Chest Pain     Comes in via EMS  c/o R side CP just under breast for the last couple of days   recent Dx of COVID  feels she's not getting better   was seen and admitted at [facility] 11/8/2023  released 11/9/2023    Weakness     Weakness since Dx of COVID        EXTERNAL RECORDS REVIEWED  Inpatient Notes from admission 11 8 for generalized weakness, noted to have hypoxia secondary to COVID    HPI/ROS  LIMITATION TO HISTORY   Select: : None  OUTSIDE HISTORIAN(S):  none    Mckenna Sewell is a 68 y.o. female who presents with cough and difficulty breathing.  Patient reports that she has had symptoms for over a week was admitted for COVID last week.  She states she has continued to have symptoms and still has some shortness of breath.  She has a cough productive of some yellow sputum, no known fevers.  She does have some right sided sharp chest pain, it is worse with movement and has also been present she reports over the last week.  It is not exertional, there is no radiation to the pain.  She reports no abdominal pain nausea or vomiting, no leg pain or swelling.  She does have history of COPD and wears oxygen at baseline    PAST MEDICAL HISTORY   has a past medical history of MAXIMILIANO (acute kidney injury) (HCC) (5/15/2022), Benign essential hypertension, CAD (coronary artery disease), CHF (congestive heart failure) (Formerly [facility]), Congestive heart failure (HCC), COPD, COPD (chronic obstructive pulmonary disease) (Formerly [facility]), COVID-19 (11/9/2023), Dilated cardiomyopathy ([facility]ly [facility]), History of cardiac catheterization, Hypertension, Mixed hyperlipidemia, Nicotine dependence, Nicotine dependence, and Type 2 diabetes mellitus with complication (Formerly [facility]).    SURGICAL HISTORY   has a past surgical history that includes other cardiac surgery; appendectomy; and open fix inter/subtroch fx,implnt (Right, 7/6/2022).    FAMILY  HISTORY  Family History   Problem Relation Age of Onset    Heart Disease Father     Heart Disease Mother     Heart Disease Maternal Aunt     Heart Attack Maternal Aunt     Heart Disease Maternal Uncle        SOCIAL HISTORY  Social History     Tobacco Use    Smoking status: Every Day     Current packs/day: 0.50     Average packs/day: 0.5 packs/day for 40.0 years (20.0 ttl pk-yrs)     Types: Cigarettes    Smokeless tobacco: Never    Tobacco comments:     1/2 ppd   Vaping Use    Vaping Use: Never used   Substance and Sexual Activity    Alcohol use: No    Drug use: No    Sexual activity: Not on file       CURRENT MEDICATIONS  Home Medications       Reviewed by Luca Maldonado (Pharmacy Tech) on 11/15/23 at 1249  Med List Status: Complete     Medication Last Dose Status   acetaminophen (TYLENOL) 500 MG Tab > 2 days Active   apixaban (ELIQUIS) 5mg Tab 11/14/2023 Active   atorvastatin (LIPITOR) 10 MG Tab 11/14/2023 Active   carvedilol (COREG) 25 MG Tab 11/14/2023 Active   cyclobenzaprine (FLEXERIL) 10 mg Tab 11/14/2023 Active   dexamethasone (DECADRON) 6 MG Tab 11/14/2023 Active   fluticasone furoate-vilanterol (BREO ELLIPTA) 200-25 MCG/ACT AEROSOL POWDER, BREATH ACTIVATED 11/14/2023 Active   furosemide (LASIX) 20 MG Tab 11/14/2023 Active   gabapentin (NEURONTIN) 300 MG Cap 11/14/2023 Active   glipiZIDE (GLUCOTROL) 5 MG Tab 11/14/2023 Active   Home Care Oxygen CONTINUOUS Active   insulin aspart (NOVOLOG FLEXPEN) 100 UNIT/ML injection PEN 11/14/2023 Active   linagliptin (TRADJENTA) 5 MG Tab tablet 11/14/2023 Active   losartan (COZAAR) 50 MG Tab 11/14/2023 Active   omeprazole (PRILOSEC) 20 MG delayed-release capsule 11/14/2023 Active   potassium chloride SA (KDUR) 20 MEQ Tab CR 11/14/2023 Active   traMADol (ULTRAM) 50 MG Tab > 2 days Active                    ALLERGIES  Allergies   Allergen Reactions    Codeine Rash     All over body      Fluticasone-Salmeterol Itching    Lisinopril Rash and Itching     On body       "Sulfa Drugs Rash and Itching     On body      Jardiance [Empagliflozin]      \"Dizziness\" per pt       PHYSICAL EXAM  VITAL SIGNS: /68   Pulse 69   Temp 36.2 °C (97.1 °F) (Temporal)   Resp 15   Ht 1.626 m (5' 4\")   Wt 65.3 kg (144 lb)   SpO2 100%   BMI 24.72 kg/m²      Pulse ox interpretation: Patient on 2 L oxygen  Constitutional: Alert   HENT: No signs of trauma, Bilateral external ears normal, Nose normal.   Eyes: Pupils are equal and reactive, Conjunctiva normal, Non-icteric.   Neck: Normal range of motion, No tenderness, Supple, No stridor.   Cardiovascular: Regular rate and rhythm, no murmurs.   Thorax & Lungs: Normal breath sounds, No respiratory distress, No wheezing, No chest tenderness.   Abdomen: Bowel sounds normal, Soft, No tenderness, No masses, No pulsatile masses. No peritoneal signs.  Skin: Warm, Dry, No erythema, No rash.   Back: No bony tenderness, No CVA tenderness.   Extremities: Intact distal pulses, No edema, No tenderness, No cyanosis,  Negative Antoinette's sign.   Musculoskeletal: Good range of motion in all major joints. No tenderness to palpation or major deformities noted.   Neurologic: Alert , Normal motor function, Normal sensory function, No focal deficits noted.   Psychiatric: Affect normal, Judgment normal, Mood normal.           DIAGNOSTIC STUDIES / PROCEDURES  Results for orders placed or performed during the hospital encounter of 11/15/23   CBC With Differential   Result Value Ref Range    WBC 9.1 4.8 - 10.8 K/uL    RBC 2.87 (L) 4.20 - 5.40 M/uL    Hemoglobin 8.6 (L) 12.0 - 16.0 g/dL    Hematocrit 27.5 (L) 37.0 - 47.0 %    MCV 95.8 81.4 - 97.8 fL    MCH 30.0 27.0 - 33.0 pg    MCHC 31.3 (L) 32.2 - 35.5 g/dL    RDW 43.7 35.9 - 50.0 fL    Platelet Count 329 164 - 446 K/uL    MPV 9.3 9.0 - 12.9 fL    Neutrophils-Polys 77.00 (H) 44.00 - 72.00 %    Lymphocytes 13.80 (L) 22.00 - 41.00 %    Monocytes 7.20 0.00 - 13.40 %    Eosinophils 1.20 0.00 - 6.90 %    Basophils 0.10 0.00 - " 1.80 %    Immature Granulocytes 0.70 0.00 - 0.90 %    Nucleated RBC 0.00 0.00 - 0.20 /100 WBC    Neutrophils (Absolute) 6.98 1.82 - 7.42 K/uL    Lymphs (Absolute) 1.25 1.00 - 4.80 K/uL    Monos (Absolute) 0.65 0.00 - 0.85 K/uL    Eos (Absolute) 0.11 0.00 - 0.51 K/uL    Baso (Absolute) 0.01 0.00 - 0.12 K/uL    Immature Granulocytes (abs) 0.06 0.00 - 0.11 K/uL    NRBC (Absolute) 0.00 K/uL   Comp Metabolic Panel   Result Value Ref Range    Sodium 136 135 - 145 mmol/L    Potassium 4.2 3.6 - 5.5 mmol/L    Chloride 103 96 - 112 mmol/L    Co2 22 20 - 33 mmol/L    Anion Gap 11.0 7.0 - 16.0    Glucose 183 (H) 65 - 99 mg/dL    Bun 9 8 - 22 mg/dL    Creatinine 0.65 0.50 - 1.40 mg/dL    Calcium 8.5 8.4 - 10.2 mg/dL    Correct Calcium 9.0 8.5 - 10.5 mg/dL    AST(SGOT) 16 12 - 45 U/L    ALT(SGPT) 15 2 - 50 U/L    Alkaline Phosphatase 93 30 - 99 U/L    Total Bilirubin 0.2 0.1 - 1.5 mg/dL    Albumin 3.4 3.2 - 4.9 g/dL    Total Protein 6.6 6.0 - 8.2 g/dL    Globulin 3.2 1.9 - 3.5 g/dL    A-G Ratio 1.1 g/dL   Lipase   Result Value Ref Range    Lipase 49 11 - 82 U/L   Lactic Acid   Result Value Ref Range    Lactic Acid 1.3 0.5 - 2.0 mmol/L   CRP Quantitative (Non-Cardiac)   Result Value Ref Range    Stat C-Reactive Protein <0.30 0.00 - 0.75 mg/dL   Procalcitonin   Result Value Ref Range    Procalcitonin 0.04 <0.25 ng/mL   D-Dimer   Result Value Ref Range    D-Dimer 0.83 (H) 0.00 - 0.50 ug/mL (FEU)   Magnesium   Result Value Ref Range    Magnesium 2.0 1.5 - 2.5 mg/dL   Troponin   Result Value Ref Range    Troponin T 24 (H) 6 - 19 ng/L   proBrain Natriuretic Peptide, NT   Result Value Ref Range    NT-proBNP 2824 (H) 0 - 125 pg/mL   ESTIMATED GFR   Result Value Ref Range    GFR (CKD-EPI) 95 >60 mL/min/1.73 m 2   EKG   Result Value Ref Range    Report       [facility] Emergency Dept.    Test Date:  2023-11-15  Pt Name:    RUPESH WONG                 Department: [facility]  MRN:        [id number]                      Room:        -ROOM 5  Gender:     Female                       Technician: [id number]  :        [id number]                   Requested By:ROWDY CARRERA  Order #:    [id number]                    Reading MD: ROWDY CARRREA MD    Measurements  Intervals                                Axis  Rate:       77                           P:          38  FL:         51                           QRS:        129  QRSD:       129                          T:          0  QT:         433  QTc:        491    Interpretive Statements  Atrial-sensed ventricular-paced rhythm  Baseline wander in lead(s) V2  Compared to ECG 2023 22:34:16  No significant changes  Electronically Signed On 11- 11:37:31 PST by ROWDY CARRERA MD           RADIOLOGY  I have independently interpreted the diagnostic imaging associated with this visit and am waiting the final reading from the radiologist.   My preliminary interpretation is as follows: No infiltrate  Radiologist interpretation:   CT-CTA CHEST PULMONARY ARTERY W/ RECONS   Final Result      1.  No evidence of pulmonary embolus.      2.  Bibasilar atelectasis.      3.  Emphysematous change of the lungs.      4.  3 mm pulmonary nodule within the right upper lobe.      5.  Acute or subacute fracture of the right anterolateral sixth rib.      6.  Subacute to chronic appearing fracture of the right posterior lateral seventh rib.      Fleischner Society pulmonary nodule recommendations:   Low Risk: No routine follow-up      High Risk: Optional CT at 12 months      Comments: Nodules less than 6 mm do not require routine follow-up, but certain patients at high risk with suspicious nodule morphology, upper lobe location, or both may warrant 12-month follow-up.      Low Risk - Minimal or absent history of smoking and of other known risk factors.      High Risk - History of smoking or of other known risk factors.      Note: These recommendations do not apply to lung cancer screening, patients with  immunosuppression, or patients with known primary cancer.      Fleischner Society 2017 Guidelines for Management of Incidentally Detected Pulmonary Nodules in Adults         DX-CHEST-PORTABLE (1 VIEW)   Final Result      1.  Linear atelectasis within the left lung base.      2.  Subacute right posterior sixth and seventh rib fractures.            COURSE & MEDICAL DECISION MAKING    ED Observation Status? Yes; I am placing the patient in to an observation status due to a diagnostic uncertainty as well as therapeutic intensity. Patient placed in observation status at 11:15 AM, 11/15/2023.     Observation plan is as follows: Telemetry monitoring given her symptoms, diagnostic evaluation as below    Upon Reevaluation, the patient's condition has: Improved; and will be discharged.    Patient discharged from ED Observation status at 2:18 PM (Time) 11/15/23   (Date).     INITIAL ASSESSMENT, COURSE AND PLAN  Care Narrative: 11:15 AM  Patient is evaluated at the bedside, at this point differential includes pneumonia, COVID19, metabolic or electrolyte derangement, consideration for pulmonary edema, pleural effusion, seems less likely to be cardiac process/ACS given the nature of her symptoms.  Consideration for pulmonary embolism as well  I have ordered for sepsis bundle    13:00 PM  Patient is reevaluated she is resting comfortably, no respiratory distress.  Updated on results, pending CTA    2:17 PM  Is reevaluated again, she is comfortable, no findings of respiratory compromise, she is comfortable with discharge           PROBLEM LIST  #Shortness of breath.  Likely secondary to her current COVID infection.  She is on her baseline oxygen without any increased work of breathing.  There are no radiographic or exam findings to suggest pneumonia, procalcitonin is negative as well.  This does not seem to be a bacterial process.  I did additionally obtain a CTA that she had a slightly elevated D-dimer and this shows no evidence  of pulmonary embolus.  She feels comfortable with continued outpatient management    #Rib fracture, appears to be subacute although I do think this is what is causing her pain.  She restarted lidocaine patch, Norco for pain, incentive spirometer for home    #Chest pain came and I think secondary to above the nature of pain and location not suggestive of ACS, she has unremarkable ECG, troponin is baseline.  Her BNP is elevated although does appear to be lower than usual for her and she does not appear to be volume overloaded either on exam or imaging.  She has follow-up with her cardiologist scheduled      DISPOSITION AND DISCUSSIONS    Barriers to care at this time, including but not limited to:  none .     Decision tools and prescription drugs considered including, but not limited to: Medication modification new prescriptions as above .       The patient will return for new or worsening symptoms and is stable at the time of discharge.    The patient is referred to a primary physician for blood pressure management, diabetic screening, and for all other preventative health concerns.    I reviewed prescription monitoring program for patient's narcotic use before prescribing a scheduled drug.The patient will not drink alcohol nor drive with prescribed medications. The patient will return for new or worsening symptoms and is stable at the time of discharge.      In prescribing controlled substances to this patient, I certify that I have obtained and reviewed the medical history of Mckenna Sewell. I have also made a good malena effort to obtain applicable records from other providers who have treated the patient and records did not demonstrate any increased risk of substance abuse that would prevent me from prescribing controlled substances.     I have conducted a physical exam and documented it. I have reviewed Ms. Sewell’s prescription history as maintained by the [place] Prescription Monitoring Program.     I have  assessed the patient’s risk for abuse, dependency, and addiction using the validated Opioid Risk Tool available at https://www.mdcalc.com/feirsw-gtyc-jtih-ort-narcotic-abuse.     Given the above, I believe the benefits of controlled substance therapy outweigh the risks. The reasons for prescribing controlled substances include non-narcotic, oral analgesic alternatives have been inadequate for pain control. Accordingly, I have discussed the risk and benefits, treatment plan, and alternative therapies with the patient.           DISPOSITION:  Patient will be discharged home in stable condition.    FOLLOW UP:  Willis Gomez M.D.  [address]  [facility] [address]  [phone]    In 1 week        OUTPATIENT MEDICATIONS:  New Prescriptions    HYDROCODONE-ACETAMINOPHEN (NORCO) 5-325 MG TAB PER TABLET    Take 1 Tablet by mouth every 8 hours as needed (pain) for up to 4 days.    LIDOCAINE (LIDODERM) 5 % PATCH    Place 1 Patch on the skin every 24 hours.         FINAL DIAGNOSIS  1. Acute cough    2. Closed fracture of multiple ribs of right side with routine healing, subsequent encounter    3. COVID-19           Electronically signed by: Ventura Pichardo M.D., 11/15/2023 11:14 AM

## 2023-11-15 NOTE — ED TRIAGE NOTES
"Pt comes in via [facility]   recent Dx of COVID 11/9/2023   and was seen and admitted to [facility] last week 11/8/2023   c/o R side \"just below my R breast\" CP for the last couple of days worse w/ cough sputum greenish in color  \"feels like my yearly Pnx\"  weakness and \"just not getting better\"    "

## 2023-11-15 NOTE — ED NOTES
"[Pt left w/out dischg instructions   called pt and informed her of this   pt was told that Rx norco and Lidoderm patch were sent to her listed pharmacy  \"We're on out way now for this\"  pt aware that Rx norco are not [id number] and that if she needed more she would need to f/u w/ PCP  for more med's  pt verbally understands  instructed to return for worsening symptoms    "

## 2023-11-15 NOTE — ED NOTES
Medication history reviewed with pt. Med rec is complete.  Allergies reviewed, per pt    Pt reports that she is taking TRADJENTA 5MG in the evening.    Patient has not had any outpatient antibiotics in the last 30 days.    Pt is on anticoagulants, pt takes ELIQUIS 5MG last dose taken 11/14/2023 @ 1830.

## 2023-11-15 NOTE — DISCHARGE INSTRUCTIONS
Your test today show no dangerous findings.  Please continue to take the steroid for your COVID19 and usual breathing treatments as needed.  Additionally you do have broken ribs but these appear to be older.  Use the pain patch and pain medication as needed for your rib.  Follow-up with your primary care and seek more immediate medical attention for any difficulty breathing, high fevers, passing out, new chest pain or other concerns.  Additionally it was noted you do have a small nodule in your lung.  It is recommended that you have a repeat imaging in 12 months with your primary care

## 2023-11-26 ENCOUNTER — HOSPITAL ENCOUNTER (INPATIENT)
Facility: MEDICAL CENTER | Age: 68
LOS: 3 days | DRG: 378 | End: 2023-11-29
Attending: EMERGENCY MEDICINE | Admitting: HOSPITALIST
Payer: MEDICARE

## 2023-11-26 ENCOUNTER — APPOINTMENT (OUTPATIENT)
Dept: RADIOLOGY | Facility: MEDICAL CENTER | Age: 68
DRG: 378 | End: 2023-11-26
Attending: EMERGENCY MEDICINE
Payer: MEDICARE

## 2023-11-26 DIAGNOSIS — Q27.30 AVM (ARTERIOVENOUS MALFORMATION): ICD-10-CM

## 2023-11-26 DIAGNOSIS — Z71.89 ACP (ADVANCE CARE PLANNING): ICD-10-CM

## 2023-11-26 DIAGNOSIS — D64.9 ANEMIA, UNSPECIFIED TYPE: ICD-10-CM

## 2023-11-26 DIAGNOSIS — N17.9 AKI (ACUTE KIDNEY INJURY) (HCC): ICD-10-CM

## 2023-11-26 DIAGNOSIS — J96.21 ACUTE ON CHRONIC RESPIRATORY FAILURE WITH HYPOXIA (HCC): ICD-10-CM

## 2023-11-26 DIAGNOSIS — U07.1 COVID-19: ICD-10-CM

## 2023-11-26 DIAGNOSIS — K92.2 GASTROINTESTINAL HEMORRHAGE, UNSPECIFIED GASTROINTESTINAL HEMORRHAGE TYPE: ICD-10-CM

## 2023-11-26 DIAGNOSIS — E87.6 HYPOKALEMIA DUE TO EXCESSIVE RENAL LOSS OF POTASSIUM: ICD-10-CM

## 2023-11-26 DIAGNOSIS — S72.8X1A OTHER FRACTURE OF RIGHT FEMUR, INITIAL ENCOUNTER FOR CLOSED FRACTURE (HCC): ICD-10-CM

## 2023-11-26 DIAGNOSIS — D69.6 THROMBOCYTOPENIA (HCC): ICD-10-CM

## 2023-11-26 DIAGNOSIS — Z98.890 HISTORY OF CARDIAC CATHETERIZATION: Chronic | ICD-10-CM

## 2023-11-26 DIAGNOSIS — F15.10 METHAMPHETAMINE ABUSE (HCC): ICD-10-CM

## 2023-11-26 DIAGNOSIS — Z95.810 BIVENTRICULAR ICD (IMPLANTABLE CARDIOVERTER-DEFIBRILLATOR) IN PLACE: Chronic | ICD-10-CM

## 2023-11-26 DIAGNOSIS — F17.200 TOBACCO DEPENDENCE: ICD-10-CM

## 2023-11-26 DIAGNOSIS — J96.01 ACUTE RESPIRATORY FAILURE WITH HYPOXIA (HCC): ICD-10-CM

## 2023-11-26 DIAGNOSIS — I10 BENIGN ESSENTIAL HYPERTENSION: Chronic | ICD-10-CM

## 2023-11-26 DIAGNOSIS — I16.0 HYPERTENSIVE URGENCY: ICD-10-CM

## 2023-11-26 DIAGNOSIS — E87.6 HYPOKALEMIA: ICD-10-CM

## 2023-11-26 DIAGNOSIS — I50.22 CHRONIC SYSTOLIC CONGESTIVE HEART FAILURE (HCC): ICD-10-CM

## 2023-11-26 DIAGNOSIS — I48.11 LONGSTANDING PERSISTENT ATRIAL FIBRILLATION (HCC): ICD-10-CM

## 2023-11-26 DIAGNOSIS — J42 CHRONIC BRONCHITIS, UNSPECIFIED CHRONIC BRONCHITIS TYPE (HCC): Chronic | ICD-10-CM

## 2023-11-26 DIAGNOSIS — I50.9 CHRONIC CONGESTIVE HEART FAILURE, UNSPECIFIED HEART FAILURE TYPE (HCC): ICD-10-CM

## 2023-11-26 DIAGNOSIS — E11.8 TYPE 2 DIABETES MELLITUS WITH COMPLICATION (HCC): Chronic | ICD-10-CM

## 2023-11-26 DIAGNOSIS — I42.0 DILATED CARDIOMYOPATHY (HCC): Chronic | ICD-10-CM

## 2023-11-26 LAB
ABO GROUP BLD: NORMAL
ALBUMIN SERPL BCP-MCNC: 3.5 G/DL (ref 3.2–4.9)
ALBUMIN/GLOB SERPL: 1.3 G/DL
ALP SERPL-CCNC: 90 U/L (ref 30–99)
ALT SERPL-CCNC: 16 U/L (ref 2–50)
ANION GAP SERPL CALC-SCNC: 10 MMOL/L (ref 7–16)
APTT PPP: 27.4 SEC (ref 24.7–36)
AST SERPL-CCNC: 15 U/L (ref 12–45)
BARCODED ABORH UBTYP: 6200
BARCODED PRD CODE UBPRD: NORMAL
BARCODED UNIT NUM UBUNT: NORMAL
BASOPHILS # BLD AUTO: 0.3 % (ref 0–1.8)
BASOPHILS # BLD: 0.02 K/UL (ref 0–0.12)
BILIRUB SERPL-MCNC: 0.2 MG/DL (ref 0.1–1.5)
BLD GP AB SCN SERPL QL: NORMAL
BUN SERPL-MCNC: 10 MG/DL (ref 8–22)
CALCIUM ALBUM COR SERPL-MCNC: 8.5 MG/DL (ref 8.5–10.5)
CALCIUM SERPL-MCNC: 8.1 MG/DL (ref 8.4–10.2)
CHLORIDE SERPL-SCNC: 104 MMOL/L (ref 96–112)
CO2 SERPL-SCNC: 25 MMOL/L (ref 20–33)
COMPONENT R 8504R: NORMAL
CREAT SERPL-MCNC: 0.81 MG/DL (ref 0.5–1.4)
CRP SERPL HS-MCNC: 0.45 MG/DL (ref 0–0.75)
EKG IMPRESSION: NORMAL
EOSINOPHIL # BLD AUTO: 0.12 K/UL (ref 0–0.51)
EOSINOPHIL NFR BLD: 1.6 % (ref 0–6.9)
ERYTHROCYTE [DISTWIDTH] IN BLOOD BY AUTOMATED COUNT: 46.7 FL (ref 35.9–50)
GFR SERPLBLD CREATININE-BSD FMLA CKD-EPI: 78 ML/MIN/1.73 M 2
GLOBULIN SER CALC-MCNC: 2.7 G/DL (ref 1.9–3.5)
GLUCOSE BLD STRIP.AUTO-MCNC: 165 MG/DL (ref 65–99)
GLUCOSE SERPL-MCNC: 249 MG/DL (ref 65–99)
HCT VFR BLD AUTO: 24.2 % (ref 37–47)
HGB BLD-MCNC: 7.6 G/DL (ref 12–16)
IMM GRANULOCYTES # BLD AUTO: 0.04 K/UL (ref 0–0.11)
IMM GRANULOCYTES NFR BLD AUTO: 0.5 % (ref 0–0.9)
INR PPP: 1.39 (ref 0.87–1.13)
LYMPHOCYTES # BLD AUTO: 1.01 K/UL (ref 1–4.8)
LYMPHOCYTES NFR BLD: 13.4 % (ref 22–41)
MCH RBC QN AUTO: 27.8 PG (ref 27–33)
MCHC RBC AUTO-ENTMCNC: 31.4 G/DL (ref 32.2–35.5)
MCV RBC AUTO: 88.6 FL (ref 81.4–97.8)
MONOCYTES # BLD AUTO: 0.53 K/UL (ref 0–0.85)
MONOCYTES NFR BLD AUTO: 7 % (ref 0–13.4)
NEUTROPHILS # BLD AUTO: 5.84 K/UL (ref 1.82–7.42)
NEUTROPHILS NFR BLD: 77.2 % (ref 44–72)
NRBC # BLD AUTO: 0 K/UL
NRBC BLD-RTO: 0 /100 WBC (ref 0–0.2)
NT-PROBNP SERPL IA-MCNC: 3213 PG/ML (ref 0–125)
PLATELET # BLD AUTO: 243 K/UL (ref 164–446)
PMV BLD AUTO: 8.4 FL (ref 9–12.9)
POTASSIUM SERPL-SCNC: 3.5 MMOL/L (ref 3.6–5.5)
PROCALCITONIN SERPL-MCNC: 0.05 NG/ML
PRODUCT TYPE UPROD: NORMAL
PROT SERPL-MCNC: 6.2 G/DL (ref 6–8.2)
PROTHROMBIN TIME: 17.7 SEC (ref 12–14.6)
RBC # BLD AUTO: 2.73 M/UL (ref 4.2–5.4)
RH BLD: NORMAL
SODIUM SERPL-SCNC: 139 MMOL/L (ref 135–145)
TROPONIN T SERPL-MCNC: 24 NG/L (ref 6–19)
UNIT STATUS USTAT: NORMAL
WBC # BLD AUTO: 7.6 K/UL (ref 4.8–10.8)

## 2023-11-26 PROCEDURE — 85025 COMPLETE CBC W/AUTO DIFF WBC: CPT

## 2023-11-26 PROCEDURE — 83880 ASSAY OF NATRIURETIC PEPTIDE: CPT

## 2023-11-26 PROCEDURE — P9016 RBC LEUKOCYTES REDUCED: HCPCS

## 2023-11-26 PROCEDURE — 93005 ELECTROCARDIOGRAM TRACING: CPT

## 2023-11-26 PROCEDURE — 30233P1 TRANSFUSION OF NONAUTOLOGOUS FROZEN RED CELLS INTO PERIPHERAL VEIN, PERCUTANEOUS APPROACH: ICD-10-PCS | Performed by: EMERGENCY MEDICINE

## 2023-11-26 PROCEDURE — A9270 NON-COVERED ITEM OR SERVICE: HCPCS | Performed by: HOSPITALIST

## 2023-11-26 PROCEDURE — 96374 THER/PROPH/DIAG INJ IV PUSH: CPT

## 2023-11-26 PROCEDURE — 700102 HCHG RX REV CODE 250 W/ 637 OVERRIDE(OP): Performed by: HOSPITALIST

## 2023-11-26 PROCEDURE — 80053 COMPREHEN METABOLIC PANEL: CPT

## 2023-11-26 PROCEDURE — 99285 EMERGENCY DEPT VISIT HI MDM: CPT

## 2023-11-26 PROCEDURE — 84145 PROCALCITONIN (PCT): CPT

## 2023-11-26 PROCEDURE — 770020 HCHG ROOM/CARE - TELE (206)

## 2023-11-26 PROCEDURE — C9113 INJ PANTOPRAZOLE SODIUM, VIA: HCPCS | Performed by: EMERGENCY MEDICINE

## 2023-11-26 PROCEDURE — 36415 COLL VENOUS BLD VENIPUNCTURE: CPT

## 2023-11-26 PROCEDURE — 36430 TRANSFUSION BLD/BLD COMPNT: CPT

## 2023-11-26 PROCEDURE — 94760 N-INVAS EAR/PLS OXIMETRY 1: CPT

## 2023-11-26 PROCEDURE — 82962 GLUCOSE BLOOD TEST: CPT

## 2023-11-26 PROCEDURE — 86900 BLOOD TYPING SEROLOGIC ABO: CPT

## 2023-11-26 PROCEDURE — 85730 THROMBOPLASTIN TIME PARTIAL: CPT

## 2023-11-26 PROCEDURE — 99497 ADVNCD CARE PLAN 30 MIN: CPT | Mod: 25 | Performed by: HOSPITALIST

## 2023-11-26 PROCEDURE — 85610 PROTHROMBIN TIME: CPT

## 2023-11-26 PROCEDURE — 99406 BEHAV CHNG SMOKING 3-10 MIN: CPT | Performed by: HOSPITALIST

## 2023-11-26 PROCEDURE — 86850 RBC ANTIBODY SCREEN: CPT

## 2023-11-26 PROCEDURE — 84484 ASSAY OF TROPONIN QUANT: CPT

## 2023-11-26 PROCEDURE — 86901 BLOOD TYPING SEROLOGIC RH(D): CPT

## 2023-11-26 PROCEDURE — 99223 1ST HOSP IP/OBS HIGH 75: CPT | Mod: AI,25 | Performed by: HOSPITALIST

## 2023-11-26 PROCEDURE — 700111 HCHG RX REV CODE 636 W/ 250 OVERRIDE (IP): Performed by: EMERGENCY MEDICINE

## 2023-11-26 PROCEDURE — 71045 X-RAY EXAM CHEST 1 VIEW: CPT

## 2023-11-26 PROCEDURE — 86923 COMPATIBILITY TEST ELECTRIC: CPT

## 2023-11-26 PROCEDURE — 86140 C-REACTIVE PROTEIN: CPT

## 2023-11-26 PROCEDURE — 93005 ELECTROCARDIOGRAM TRACING: CPT | Performed by: EMERGENCY MEDICINE

## 2023-11-26 RX ORDER — FLUTICASONE FUROATE AND VILANTEROL 200; 25 UG/1; UG/1
1 POWDER RESPIRATORY (INHALATION) DAILY
Status: DISCONTINUED | OUTPATIENT
Start: 2023-11-27 | End: 2023-11-26

## 2023-11-26 RX ORDER — CARVEDILOL 25 MG/1
25 TABLET ORAL 2 TIMES DAILY WITH MEALS
Status: DISCONTINUED | OUTPATIENT
Start: 2023-11-26 | End: 2023-11-29 | Stop reason: HOSPADM

## 2023-11-26 RX ORDER — PANTOPRAZOLE SODIUM 40 MG/10ML
40 INJECTION, POWDER, LYOPHILIZED, FOR SOLUTION INTRAVENOUS 2 TIMES DAILY
Status: DISCONTINUED | OUTPATIENT
Start: 2023-11-27 | End: 2023-11-28

## 2023-11-26 RX ORDER — LOSARTAN POTASSIUM 50 MG/1
50 TABLET ORAL 2 TIMES DAILY
Status: DISCONTINUED | OUTPATIENT
Start: 2023-11-27 | End: 2023-11-29 | Stop reason: HOSPADM

## 2023-11-26 RX ORDER — POLYETHYLENE GLYCOL 3350 17 G/17G
1 POWDER, FOR SOLUTION ORAL
Status: DISCONTINUED | OUTPATIENT
Start: 2023-11-26 | End: 2023-11-29 | Stop reason: HOSPADM

## 2023-11-26 RX ORDER — SODIUM CHLORIDE 9 MG/ML
INJECTION, SOLUTION INTRAVENOUS CONTINUOUS
Status: DISCONTINUED | OUTPATIENT
Start: 2023-11-26 | End: 2023-11-26

## 2023-11-26 RX ORDER — POTASSIUM CHLORIDE 20 MEQ/1
40 TABLET, EXTENDED RELEASE ORAL ONCE
Status: COMPLETED | OUTPATIENT
Start: 2023-11-26 | End: 2023-11-26

## 2023-11-26 RX ORDER — ATORVASTATIN CALCIUM 10 MG/1
10 TABLET, FILM COATED ORAL EVERY EVENING
Status: DISCONTINUED | OUTPATIENT
Start: 2023-11-26 | End: 2023-11-29 | Stop reason: HOSPADM

## 2023-11-26 RX ORDER — AMOXICILLIN 250 MG
2 CAPSULE ORAL 2 TIMES DAILY
Status: DISCONTINUED | OUTPATIENT
Start: 2023-11-26 | End: 2023-11-29 | Stop reason: HOSPADM

## 2023-11-26 RX ORDER — DEXTROSE MONOHYDRATE 25 G/50ML
25 INJECTION, SOLUTION INTRAVENOUS
Status: DISCONTINUED | OUTPATIENT
Start: 2023-11-26 | End: 2023-11-29 | Stop reason: HOSPADM

## 2023-11-26 RX ORDER — ONDANSETRON 4 MG/1
4 TABLET, ORALLY DISINTEGRATING ORAL EVERY 4 HOURS PRN
Status: DISCONTINUED | OUTPATIENT
Start: 2023-11-26 | End: 2023-11-29 | Stop reason: HOSPADM

## 2023-11-26 RX ORDER — CYCLOBENZAPRINE HCL 10 MG
10 TABLET ORAL 3 TIMES DAILY PRN
Status: DISCONTINUED | OUTPATIENT
Start: 2023-11-26 | End: 2023-11-29 | Stop reason: HOSPADM

## 2023-11-26 RX ORDER — ACETAMINOPHEN 325 MG/1
650 TABLET ORAL EVERY 6 HOURS PRN
Status: DISCONTINUED | OUTPATIENT
Start: 2023-11-26 | End: 2023-11-29 | Stop reason: HOSPADM

## 2023-11-26 RX ORDER — BISACODYL 10 MG
10 SUPPOSITORY, RECTAL RECTAL
Status: DISCONTINUED | OUTPATIENT
Start: 2023-11-26 | End: 2023-11-29 | Stop reason: HOSPADM

## 2023-11-26 RX ORDER — ONDANSETRON 2 MG/ML
4 INJECTION INTRAMUSCULAR; INTRAVENOUS EVERY 4 HOURS PRN
Status: DISCONTINUED | OUTPATIENT
Start: 2023-11-26 | End: 2023-11-29 | Stop reason: HOSPADM

## 2023-11-26 RX ORDER — ALBUTEROL SULFATE 90 UG/1
2 AEROSOL, METERED RESPIRATORY (INHALATION)
Status: DISCONTINUED | OUTPATIENT
Start: 2023-11-26 | End: 2023-11-29 | Stop reason: HOSPADM

## 2023-11-26 RX ORDER — GABAPENTIN 300 MG/1
600 CAPSULE ORAL
Status: DISCONTINUED | OUTPATIENT
Start: 2023-11-26 | End: 2023-11-29 | Stop reason: HOSPADM

## 2023-11-26 RX ORDER — PANTOPRAZOLE SODIUM 40 MG/10ML
80 INJECTION, POWDER, LYOPHILIZED, FOR SOLUTION INTRAVENOUS ONCE
Status: COMPLETED | OUTPATIENT
Start: 2023-11-26 | End: 2023-11-26

## 2023-11-26 RX ADMIN — CARVEDILOL 25 MG: 25 TABLET, FILM COATED ORAL at 21:31

## 2023-11-26 RX ADMIN — ATORVASTATIN CALCIUM 10 MG: 10 TABLET, FILM COATED ORAL at 21:31

## 2023-11-26 RX ADMIN — GABAPENTIN 600 MG: 300 CAPSULE ORAL at 21:30

## 2023-11-26 RX ADMIN — POTASSIUM CHLORIDE 40 MEQ: 1500 TABLET, EXTENDED RELEASE ORAL at 21:31

## 2023-11-26 RX ADMIN — DOCUSATE SODIUM 50MG AND SENNOSIDES 8.6MG 2 TABLET: 8.6; 5 TABLET, FILM COATED ORAL at 21:30

## 2023-11-26 RX ADMIN — PANTOPRAZOLE SODIUM 80 MG: 40 INJECTION, POWDER, LYOPHILIZED, FOR SOLUTION INTRAVENOUS at 20:08

## 2023-11-26 ASSESSMENT — LIFESTYLE VARIABLES
HAVE YOU EVER FELT YOU SHOULD CUT DOWN ON YOUR DRINKING: NO
EVER HAD A DRINK FIRST THING IN THE MORNING TO STEADY YOUR NERVES TO GET RID OF A HANGOVER: NO
TOTAL SCORE: 0
TOTAL SCORE: 0
HAVE PEOPLE ANNOYED YOU BY CRITICIZING YOUR DRINKING: NO
ALCOHOL_USE: NO
HOW MANY TIMES IN THE PAST YEAR HAVE YOU HAD 5 OR MORE DRINKS IN A DAY: 0
EVER FELT BAD OR GUILTY ABOUT YOUR DRINKING: NO
TOTAL SCORE: 0
ON A TYPICAL DAY WHEN YOU DRINK ALCOHOL HOW MANY DRINKS DO YOU HAVE: 0
AVERAGE NUMBER OF DAYS PER WEEK YOU HAVE A DRINK CONTAINING ALCOHOL: 0
CONSUMPTION TOTAL: NEGATIVE

## 2023-11-26 ASSESSMENT — PATIENT HEALTH QUESTIONNAIRE - PHQ9
SUM OF ALL RESPONSES TO PHQ9 QUESTIONS 1 AND 2: 0
2. FEELING DOWN, DEPRESSED, IRRITABLE, OR HOPELESS: NOT AT ALL
1. LITTLE INTEREST OR PLEASURE IN DOING THINGS: NOT AT ALL

## 2023-11-26 ASSESSMENT — ENCOUNTER SYMPTOMS
SHORTNESS OF BREATH: 1
CHILLS: 0
VOMITING: 0
FLANK PAIN: 0
EYE DISCHARGE: 0
STRIDOR: 0
FEVER: 0
MYALGIAS: 0
EYE REDNESS: 0
BRUISES/BLEEDS EASILY: 0
NERVOUS/ANXIOUS: 0
FOCAL WEAKNESS: 0
ABDOMINAL PAIN: 0
COUGH: 1

## 2023-11-26 ASSESSMENT — COGNITIVE AND FUNCTIONAL STATUS - GENERAL
HELP NEEDED FOR BATHING: A LITTLE
DRESSING REGULAR UPPER BODY CLOTHING: A LITTLE
DAILY ACTIVITIY SCORE: 21
STANDING UP FROM CHAIR USING ARMS: A LITTLE
MOVING TO AND FROM BED TO CHAIR: A LITTLE
CLIMB 3 TO 5 STEPS WITH RAILING: A LITTLE
WALKING IN HOSPITAL ROOM: A LITTLE
TOILETING: A LITTLE
SUGGESTED CMS G CODE MODIFIER DAILY ACTIVITY: CJ
MOBILITY SCORE: 19
SUGGESTED CMS G CODE MODIFIER MOBILITY: CK
MOVING FROM LYING ON BACK TO SITTING ON SIDE OF FLAT BED: A LITTLE

## 2023-11-26 ASSESSMENT — FIBROSIS 4 INDEX
FIB4 SCORE: 1.08
FIB4 SCORE: 0.88

## 2023-11-26 ASSESSMENT — PAIN DESCRIPTION - PAIN TYPE: TYPE: ACUTE PAIN

## 2023-11-27 LAB
ALBUMIN SERPL BCP-MCNC: 3.2 G/DL (ref 3.2–4.9)
ALBUMIN/GLOB SERPL: 1.2 G/DL
ALP SERPL-CCNC: 86 U/L (ref 30–99)
ALT SERPL-CCNC: 13 U/L (ref 2–50)
ANION GAP SERPL CALC-SCNC: 8 MMOL/L (ref 7–16)
AST SERPL-CCNC: 14 U/L (ref 12–45)
BILIRUB SERPL-MCNC: 0.7 MG/DL (ref 0.1–1.5)
BUN SERPL-MCNC: 7 MG/DL (ref 8–22)
CALCIUM ALBUM COR SERPL-MCNC: 8.7 MG/DL (ref 8.5–10.5)
CALCIUM SERPL-MCNC: 8.1 MG/DL (ref 8.4–10.2)
CHLORIDE SERPL-SCNC: 104 MMOL/L (ref 96–112)
CO2 SERPL-SCNC: 25 MMOL/L (ref 20–33)
CREAT SERPL-MCNC: 0.69 MG/DL (ref 0.5–1.4)
ERYTHROCYTE [DISTWIDTH] IN BLOOD BY AUTOMATED COUNT: 48.2 FL (ref 35.9–50)
GFR SERPLBLD CREATININE-BSD FMLA CKD-EPI: 93 ML/MIN/1.73 M 2
GLOBULIN SER CALC-MCNC: 2.7 G/DL (ref 1.9–3.5)
GLUCOSE BLD STRIP.AUTO-MCNC: 161 MG/DL (ref 65–99)
GLUCOSE BLD STRIP.AUTO-MCNC: 206 MG/DL (ref 65–99)
GLUCOSE BLD STRIP.AUTO-MCNC: 305 MG/DL (ref 65–99)
GLUCOSE SERPL-MCNC: 202 MG/DL (ref 65–99)
HCT VFR BLD AUTO: 27.7 % (ref 37–47)
HGB BLD-MCNC: 8.7 G/DL (ref 12–16)
HGB BLD-MCNC: 9.3 G/DL (ref 12–16)
HGB BLD-MCNC: 9.4 G/DL (ref 12–16)
MAGNESIUM SERPL-MCNC: 2.1 MG/DL (ref 1.5–2.5)
MCH RBC QN AUTO: 27.9 PG (ref 27–33)
MCHC RBC AUTO-ENTMCNC: 31.4 G/DL (ref 32.2–35.5)
MCV RBC AUTO: 88.8 FL (ref 81.4–97.8)
PLATELET # BLD AUTO: 237 K/UL (ref 164–446)
PMV BLD AUTO: 9 FL (ref 9–12.9)
POTASSIUM SERPL-SCNC: 4.1 MMOL/L (ref 3.6–5.5)
PROT SERPL-MCNC: 5.9 G/DL (ref 6–8.2)
RBC # BLD AUTO: 3.12 M/UL (ref 4.2–5.4)
SODIUM SERPL-SCNC: 137 MMOL/L (ref 135–145)
WBC # BLD AUTO: 6.3 K/UL (ref 4.8–10.8)

## 2023-11-27 PROCEDURE — 770020 HCHG ROOM/CARE - TELE (206)

## 2023-11-27 PROCEDURE — 700102 HCHG RX REV CODE 250 W/ 637 OVERRIDE(OP): Performed by: HOSPITALIST

## 2023-11-27 PROCEDURE — 700111 HCHG RX REV CODE 636 W/ 250 OVERRIDE (IP): Performed by: HOSPITALIST

## 2023-11-27 PROCEDURE — 94760 N-INVAS EAR/PLS OXIMETRY 1: CPT

## 2023-11-27 PROCEDURE — 83735 ASSAY OF MAGNESIUM: CPT

## 2023-11-27 PROCEDURE — 82962 GLUCOSE BLOOD TEST: CPT | Mod: 91

## 2023-11-27 PROCEDURE — 99233 SBSQ HOSP IP/OBS HIGH 50: CPT | Performed by: INTERNAL MEDICINE

## 2023-11-27 PROCEDURE — 36415 COLL VENOUS BLD VENIPUNCTURE: CPT

## 2023-11-27 PROCEDURE — C9113 INJ PANTOPRAZOLE SODIUM, VIA: HCPCS | Performed by: HOSPITALIST

## 2023-11-27 PROCEDURE — 85018 HEMOGLOBIN: CPT

## 2023-11-27 PROCEDURE — 700101 HCHG RX REV CODE 250: Performed by: INTERNAL MEDICINE

## 2023-11-27 PROCEDURE — 94640 AIRWAY INHALATION TREATMENT: CPT

## 2023-11-27 PROCEDURE — 80053 COMPREHEN METABOLIC PANEL: CPT

## 2023-11-27 PROCEDURE — 700101 HCHG RX REV CODE 250: Performed by: NURSE PRACTITIONER

## 2023-11-27 PROCEDURE — A9270 NON-COVERED ITEM OR SERVICE: HCPCS | Performed by: HOSPITALIST

## 2023-11-27 PROCEDURE — 85027 COMPLETE CBC AUTOMATED: CPT

## 2023-11-27 RX ORDER — LIDOCAINE 50 MG/G
2 PATCH TOPICAL EVERY 24 HOURS
Status: DISCONTINUED | OUTPATIENT
Start: 2023-11-27 | End: 2023-11-29 | Stop reason: HOSPADM

## 2023-11-27 RX ADMIN — LOSARTAN POTASSIUM 50 MG: 50 TABLET, FILM COATED ORAL at 17:49

## 2023-11-27 RX ADMIN — LOSARTAN POTASSIUM 50 MG: 50 TABLET, FILM COATED ORAL at 05:11

## 2023-11-27 RX ADMIN — PANTOPRAZOLE SODIUM 40 MG: 40 INJECTION, POWDER, LYOPHILIZED, FOR SOLUTION INTRAVENOUS at 17:49

## 2023-11-27 RX ADMIN — CARVEDILOL 25 MG: 25 TABLET, FILM COATED ORAL at 07:51

## 2023-11-27 RX ADMIN — MOMETASONE FUROATE AND FORMOTEROL FUMARATE DIHYDRATE 2 PUFF: 200; 5 AEROSOL RESPIRATORY (INHALATION) at 07:19

## 2023-11-27 RX ADMIN — PANTOPRAZOLE SODIUM 40 MG: 40 INJECTION, POWDER, LYOPHILIZED, FOR SOLUTION INTRAVENOUS at 05:11

## 2023-11-27 RX ADMIN — MOMETASONE FUROATE AND FORMOTEROL FUMARATE DIHYDRATE 2 PUFF: 200; 5 AEROSOL RESPIRATORY (INHALATION) at 20:23

## 2023-11-27 RX ADMIN — GABAPENTIN 600 MG: 300 CAPSULE ORAL at 20:29

## 2023-11-27 RX ADMIN — LIDOCAINE 2 PATCH: 50 PATCH TOPICAL at 12:33

## 2023-11-27 RX ADMIN — INSULIN HUMAN 3 UNITS: 100 INJECTION, SOLUTION PARENTERAL at 17:52

## 2023-11-27 RX ADMIN — INSULIN HUMAN 3 UNITS: 100 INJECTION, SOLUTION PARENTERAL at 20:35

## 2023-11-27 RX ADMIN — POLYETHYLENE GLYCOL-3350 AND ELECTROLYTES 4 L: 236; 6.74; 5.86; 2.97; 22.74 POWDER, FOR SOLUTION ORAL at 15:26

## 2023-11-27 RX ADMIN — CARVEDILOL 25 MG: 25 TABLET, FILM COATED ORAL at 17:49

## 2023-11-27 RX ADMIN — ATORVASTATIN CALCIUM 10 MG: 10 TABLET, FILM COATED ORAL at 17:49

## 2023-11-27 RX ADMIN — INSULIN HUMAN 6 UNITS: 100 INJECTION, SOLUTION PARENTERAL at 11:31

## 2023-11-27 ASSESSMENT — ENCOUNTER SYMPTOMS
DIARRHEA: 0
SHORTNESS OF BREATH: 0
EYES NEGATIVE: 1
PSYCHIATRIC NEGATIVE: 1
CHILLS: 0
BACK PAIN: 0
CONSTIPATION: 0
WEAKNESS: 1
SHORTNESS OF BREATH: 1
HEADACHES: 0
DIZZINESS: 0
GASTROINTESTINAL NEGATIVE: 1
COUGH: 1
MUSCULOSKELETAL NEGATIVE: 1
NAUSEA: 0
CARDIOVASCULAR NEGATIVE: 1
PALPITATIONS: 0
FEVER: 0
COUGH: 0
VOMITING: 0
ABDOMINAL PAIN: 0

## 2023-11-27 ASSESSMENT — PAIN DESCRIPTION - PAIN TYPE: TYPE: ACUTE PAIN

## 2023-11-27 NOTE — ED NOTES
Medication history reviewed with patient at bedside.     Allergies reviewed    Patient denied any outpatient antibiotics in the last 30 days.      Any anticoagulants taken in the last 14 days? Yes, last apixaban dose on 11/26 in AM            Aarti Gustavo, Pharm.D., [facility]  ER Clinical Pharmacist

## 2023-11-27 NOTE — ED NOTES
Pt requesting to use bathroom. Pt placed on purewick due to ongoing weakness and shortness of breath

## 2023-11-27 NOTE — ASSESSMENT & PLAN NOTE
Symptomatic because of severe anemia.  Status post 1 unit blood transfusion this admit w improvement of hgb  Continue home carvedilol, losartan  Hold Eliquis

## 2023-11-27 NOTE — ED TRIAGE NOTES
Chief Complaint   Patient presents with    Weakness     Pt c/o ongoing weakness    Chest Wall Pain     Pt c/o pain w/ coughing and deep breathing

## 2023-11-27 NOTE — H&P
"Hospital Medicine History & Physical Note    Date of Service  11/26/2023    Primary Care Physician  Willis Gomez M.D.    Consultants  Dr Elma MAGAÑA     Code Status  Full Code    Chief Complaint  Chief Complaint   Patient presents with    Weakness     Pt c/o ongoing weakness    Chest Wall Pain     Pt c/o pain w/ coughing and deep breathing  States recently dx w/ rib fractures r/t coughing    Shortness of Breath     Pt stated \"can't hardly breath\"  Pt does use 2L home oxygen     History of Presenting Illness  Mckenna Sewell is a 68 y.o. female with a past medical history of chronic obstructive pulmonary disease, hypertension, hyperlipidemia, diabetes and atrial fibrillation on anticoagulation with apixaban who presented 11/26/2023 with generalized weakness, shortness of breath and painful coughing.  Patient reports that she has not been feeling well since the start of the month.  She was recently diagnosed with COVID infection.  However, over the past few days has been having progressively worsening generalized weakness, easy fatigability and shortness of breath.  In the emergency room she was found to be having a low hemoglobin of 7.6.  She denies noticing any blood in stool however she tested positive for occult blood and the emergency room.     I discussed the plan of care with emergency department physician, and the patient    Review of Systems  Review of Systems   Constitutional:  Positive for malaise/fatigue. Negative for chills and fever.   Eyes:  Negative for discharge and redness.   Respiratory:  Positive for cough and shortness of breath. Negative for stridor.    Cardiovascular:  Negative for chest pain and leg swelling.   Gastrointestinal:  Negative for abdominal pain and vomiting.   Genitourinary:  Negative for flank pain.   Musculoskeletal:  Negative for myalgias.   Skin: Negative.    Neurological:  Negative for focal weakness.   Endo/Heme/Allergies:  Does not bruise/bleed easily. " "  Psychiatric/Behavioral:  The patient is not nervous/anxious.      Past Medical History   has a past medical history of MAXIMILIANO (acute kidney injury) ([facility]) (5/15/2022), Benign essential hypertension, CAD (coronary artery disease), CHF (congestive heart failure) ([facility]), Congestive heart failure (HCC), COPD, COPD (chronic obstructive pulmonary disease) (HCC), COVID-19 (11/9/2023), Dilated cardiomyopathy ([facility]), History of cardiac catheterization, Hypertension, Mixed hyperlipidemia, Nicotine dependence, Nicotine dependence, and Type 2 diabetes mellitus with complication ([facility]).    Surgical History   has a past surgical history that includes other cardiac surgery; appendectomy; and pr open fix inter/subtroch fx,implnt (Right, 7/6/2022).     Family History  family history includes Heart Attack in her maternal aunt; Heart Disease in her father, maternal aunt, maternal uncle, and mother.      Social History   reports that she has been smoking cigarettes. She has a 20.0 pack-year smoking history. She has never used smokeless tobacco. She reports that she does not drink alcohol and does not use drugs.    Allergies  Allergies   Allergen Reactions    Codeine Rash     All over body      Fluticasone-Salmeterol Itching    Lisinopril Rash and Itching     On body      Sulfa Drugs Rash and Itching     On body      Jardiance [Empagliflozin]      \"Dizziness\" per pt     Medications  Prior to Admission Medications   Prescriptions Last Dose Informant Patient Reported? Taking?   Home Care Oxygen  Patient Yes No   Sig: Inhale 3 L/min continuous. DME - [facility]   acetaminophen (TYLENOL) 500 MG Tab PRN Patient Yes No   Sig: Take 500-1,000 mg by mouth every 6 hours as needed. Indications: Pain   apixaban (ELIQUIS) 5mg Tab 11/26/2023 at AM Patient Yes No   Sig: Take 5 mg by mouth 2 times a day.   atorvastatin (LIPITOR) 10 MG Tab 11/25/2023 at PM Patient Yes No   Sig: Take 10 mg by mouth every evening.   carvedilol (COREG) 25 MG Tab 11/26/2023 at AM " Patient No No   Sig: TAKE ONE TABLET BY MOUTH TWICE DAILY WITH MEALS   Patient taking differently: Take 25 mg by mouth 2 times a day.   cyclobenzaprine (FLEXERIL) 10 mg Tab PRN Patient No No   Sig: Take 1 Tablet by mouth 3 times a day as needed for Muscle Spasms or Moderate Pain.   fluticasone furoate-vilanterol (BREO ELLIPTA) 200-25 MCG/ACT AEROSOL POWDER, BREATH ACTIVATED 11/26/2023 at AM Patient Yes No   Sig: Inhale 1 Puff every day.   furosemide (LASIX) 20 MG Tab 11/26/2023 at AM Patient No No   Sig: Take 1 Tablet by mouth 2 times a day.   gabapentin (NEURONTIN) 300 MG Cap 11/25/2023 at PM Patient No No   Sig: Take 2 Capsules by mouth at bedtime.   glipiZIDE (GLUCOTROL) 5 MG Tab UNK Patient Yes No   Sig: Take 5 mg by mouth every day.   insulin aspart (NOVOLOG FLEXPEN) 100 UNIT/ML injection PEN UNK Patient Yes No   Sig: Inject 2-18 Units under the skin 2 times a day. PER SLIDING SCALE:  2 units for level 150+, then additional 2 units for every 50 above.   lidocaine (LIDODERM) 5 % Patch UNK  No No   Sig: Place 1 Patch on the skin every 24 hours.   linagliptin (TRADJENTA) 5 MG Tab tablet UNK Patient Yes No   Sig: Take 5 mg by mouth every evening.   losartan (COZAAR) 50 MG Tab 11/26/2023 at AM Patient No No   Sig: Take 1 Tab by mouth 2 Times a Day.   omeprazole (PRILOSEC) 20 MG delayed-release capsule UNK Patient No No   Sig: Take 1 Capsule by mouth every day.   potassium chloride SA (KDUR) 20 MEQ Tab CR UNK Patient No No   Sig: Take 1 Tablet by mouth 2 times a day.      Facility-Administered Medications: None     Physical Exam  Temp:  [36.3 °C (97.4 °F)-36.6 °C (97.9 °F)] 36.6 °C (97.9 °F)  Pulse:  [65-79] 73  Resp:  [18-36] 20  BP: (132-165)/() 132/100  SpO2:  [99 %-100 %] 100 %  Blood Pressure : (!) 165/80   Temperature: 36.3 °C (97.4 °F)   Pulse: 65   Respiration: (!) 36   Pulse Oximetry: 100 %     Physical Exam  Vitals and nursing note reviewed.   Constitutional:       General: She is not in acute  distress.     Appearance: Normal appearance.   HENT:      Head: Normocephalic and atraumatic.      Right Ear: External ear normal.      Left Ear: External ear normal.      Nose: Nose normal.      Mouth/Throat:      Mouth: Mucous membranes are dry.   Eyes:      General:         Right eye: No discharge.         Left eye: No discharge.      Extraocular Movements: Extraocular movements intact.      Pupils: Pupils are equal, round, and reactive to light.   Cardiovascular:      Rate and Rhythm: Tachycardia present.      Heart sounds:      No friction rub. No gallop.      Comments: Faint heart sounds  Pulmonary:      Effort: Pulmonary effort is normal.      Breath sounds: No stridor. Decreased breath sounds and wheezing present.   Abdominal:      General: Abdomen is flat. There is no distension.      Tenderness: There is no abdominal tenderness.   Musculoskeletal:         General: No swelling or deformity. Normal range of motion.      Cervical back: Normal range of motion and neck supple.   Skin:     General: Skin is warm and dry.      Capillary Refill: Capillary refill takes 2 to 3 seconds.      Coloration: Skin is pale.   Neurological:      General: No focal deficit present.      Mental Status: She is alert and oriented to person, place, and time.   Psychiatric:         Mood and Affect: Mood normal.         Behavior: Behavior normal.       Laboratory:  Recent Labs     11/26/23 1844   WBC 7.6   RBC 2.73*   HEMOGLOBIN 7.6*   HEMATOCRIT 24.2*   MCV 88.6   MCH 27.8   MCHC 31.4*   RDW 46.7   PLATELETCT 243   MPV 8.4*     Recent Labs     11/26/23 1844   SODIUM 139   POTASSIUM 3.5*   CHLORIDE 104   CO2 25   GLUCOSE 249*   BUN 10   CREATININE 0.81   CALCIUM 8.1*     Recent Labs     11/26/23 1844   ALTSGPT 16   ASTSGOT 15   ALKPHOSPHAT 90   TBILIRUBIN 0.2   GLUCOSE 249*     Recent Labs     11/26/23 1844   APTT 27.4   INR 1.39*     Recent Labs     11/26/23 1844   NTPROBNP 3213*         Recent Labs     11/26/23 1844    TROPONINT 24*     Imaging  DX-CHEST-PORTABLE (1 VIEW)   Final Result      No acute cardiopulmonary abnormality identified.        Assessment/Plan:  Justification for Admission Status  I anticipate this patient is appropriate for observation status at this time because patient has GI bleeding.  Will require blood transfusion, GI consultation and likely scope evaluation.    Patient will need a Telemetry bed on telemetry floor, cardiology service.  Patient has GI bleeding with heart failure requiring blood transfusion, GI consultation and likely scope evaluation.    * GI bleeding- (present on admission)  Assessment & Plan  Insert to wide pore peripheral IV accesses    I will order intravenous fluids   I will order H&H to be checked every 8 hours  Transfuse for Hb <8 given her heart condition  I will order IV PPI    GI consulted, GI, Dr Wills     Revised Cardiac Risk Index for Pre-Operative Risk score of:  4 points Class IV Risk  The patient has 15.0 % 30-day risk of death, MI, or cardiac arrest     Chronic systolic congestive heart failure (HCC)- (present on admission)  Assessment & Plan  Symptomatic because of severe anemia.  We will order a blood transfusion  Monitor for volume overload, consider Lasix  Resume carvedilol losartan with hold parameters   Revised Cardiac Risk Index for Pre-Operative Risk score of:  4 points Class IV Risk  The patient has 15.0 % 30-day risk of death, MI, or cardiac arrest     ACP (advance care planning)- (present on admission)  Assessment & Plan  I had a discussion with the patient and regarding goals of care, diagnoses, prognosis, and CODE STATUS. We discussed her prognosis and comorbidities.  The patient has advanced age and multiple comorbid conditions including chronic obstructive pulmonary disease, heart failure, diabetes and atrial fibrillation.  Presenting with GI bleeding.  At this point she wants to proceed with a full code.  She is open to all forms of diagnostic and  therapeutic interventions including upper and lower endoscopy.    Hypokalemia- (present on admission)  Assessment & Plan  Replacing, checking magnesium    Continue to monitor replace as needed     AF (atrial fibrillation) ([facility])- (present on admission)  Assessment & Plan  I will hold home apixaban for now given her GI bleeding.  Resume metoprolol with hold parameters    Type 2 diabetes mellitus with complication ([facility])- (present on admission)  Assessment & Plan  With hyperglycemia  Last glycated hemoglobin was 5.5%  I will start short acting insulin for now  I will order Accu-Checks, hypoglycemia protocol  Adjust according to blood sugars trend.     COPD (chronic obstructive pulmonary disease) ([facility])- (present on admission)  Assessment & Plan  Oxygen as needed, Respiratory protocol, Bronchodilators, Incentive spirometry      Tobacco dependence- (present on admission)  Assessment & Plan  I spent 4 minutes on Tobacco cessation education and counseling.   I Discussed the benefits of quitting smoking and risks of continued smoking including cardiovascular disease, cancer and COPD.   Discussed the option of nicotine patch     Hyperlipidemia- (present on admission)  Assessment & Plan  Cardiac diet when able to take orally.  Resume atorvastatin      VTE prophylaxis: SCDs/TEDs holding home anticoagulation due to GI bleeding    I had a discussion with the patient and regarding goals of care, diagnoses, prognosis, and CODE STATUS. We discussed her prognosis and comorbidities.  The patient has advanced age and multiple comorbid conditions including chronic obstructive pulmonary disease, heart failure, diabetes and atrial fibrillation.  Presenting with GI bleeding.  At this point she wants to proceed with a full code.  She is open to all forms of diagnostic and therapeutic interventions including upper and lower endoscopy.I spent 21 minutes on advanced care planning in addition to the time spent managing the other medical  problems.

## 2023-11-27 NOTE — PROGRESS NOTES
"Hospital Medicine Daily Progress Note    Date of Service  11/27/2023    Chief Complaint  Mckenna Sewell is a 68 y.o. female admitted 11/26/2023 with   Chief Complaint   Patient presents with    Weakness     Pt c/o ongoing weakness    Chest Wall Pain     Pt c/o pain w/ coughing and deep breathing  States recently dx w/ rib fractures r/t coughing    Shortness of Breath     Pt stated \"can't hardly breath\"  Pt does use 2L home oxygen     Hospital Course  No notes on file    Interval Problem Update  Patient denied any melena She stated she was sent here for low labs.  - I spoke with GI Dr. Lawrence, will proceed with EGD and Colonoscopy 11/28  -I was notified by bedside RN, patient did not, feels almost leaving AMA    I have discussed this patient's plan of care and discharge plan at IDT rounds today with Case Management, Nursing, Nursing leadership, and other members of the IDT team.    Consultants/Specialty  GI    Code Status  Full Code    Disposition  The patient is not medically cleared for discharge to home or a post-acute facility.  Anticipate discharge to: home with close outpatient follow-up    I have placed the appropriate orders for post-discharge needs.    Review of Systems  Review of Systems   Constitutional:  Positive for malaise/fatigue. Negative for chills and fever.   Respiratory:  Negative for cough and shortness of breath.    Cardiovascular:  Negative for chest pain and palpitations.   Gastrointestinal:  Negative for abdominal pain, constipation, diarrhea, nausea and vomiting.   Musculoskeletal:  Negative for back pain and joint pain.   Neurological:  Negative for dizziness and headaches.   All other systems reviewed and are negative.       Physical Exam  Temp:  [36.4 °C (97.6 °F)-37 °C (98.6 °F)] 37 °C (98.6 °F)  Pulse:  [64-80] 73  Resp:  [17-20] 17  BP: (122-161)/() 161/77  SpO2:  [97 %-100 %] 99 %    Physical Exam  Vitals and nursing note reviewed.   Constitutional:       General: She is " not in acute distress.     Appearance: Normal appearance. She is not ill-appearing.   HENT:      Head: Normocephalic and atraumatic.   Eyes:      General: No scleral icterus.     Extraocular Movements: Extraocular movements intact.   Cardiovascular:      Rate and Rhythm: Normal rate.      Pulses: Normal pulses.      Heart sounds: Normal heart sounds. No murmur heard.  Pulmonary:      Effort: Pulmonary effort is normal. No respiratory distress.      Breath sounds: Normal breath sounds.      Comments: On oxygen supplementation  Abdominal:      General: Abdomen is flat. Bowel sounds are normal. There is no distension.      Palpations: Abdomen is soft.   Musculoskeletal:         General: No swelling or tenderness. Normal range of motion.      Cervical back: Normal range of motion and neck supple.   Skin:     General: Skin is warm.      Capillary Refill: Capillary refill takes less than 2 seconds.      Coloration: Skin is not jaundiced.      Findings: No erythema.   Neurological:      General: No focal deficit present.      Mental Status: She is alert and oriented to person, place, and time. Mental status is at baseline.      Motor: No weakness.   Psychiatric:         Mood and Affect: Mood normal.         Behavior: Behavior normal.         Thought Content: Thought content normal.         Judgment: Judgment normal.         Fluids    Intake/Output Summary (Last 24 hours) at 11/27/2023 2113  Last data filed at 11/27/2023 0050  Gross per 24 hour   Intake 314.17 ml   Output --   Net 314.17 ml       Laboratory  Recent Labs     11/26/23  1844 11/27/23  0204 11/27/23  1054 11/27/23  1917   WBC 7.6 6.3  --   --    RBC 2.73* 3.12*  --   --    HEMOGLOBIN 7.6* 8.7* 9.3* 9.4*   HEMATOCRIT 24.2* 27.7*  --   --    MCV 88.6 88.8  --   --    MCH 27.8 27.9  --   --    MCHC 31.4* 31.4*  --   --    RDW 46.7 48.2  --   --    PLATELETCT 243 237  --   --    MPV 8.4* 9.0  --   --      Recent Labs     11/26/23  1844 11/27/23  0204   SODIUM 139  137   POTASSIUM 3.5* 4.1   CHLORIDE 104 104   CO2 25 25   GLUCOSE 249* 202*   BUN 10 7*   CREATININE 0.81 0.69   CALCIUM 8.1* 8.1*     Recent Labs     11/26/23  1844   APTT 27.4   INR 1.39*               Imaging  DX-CHEST-PORTABLE (1 VIEW)   Final Result      No acute cardiopulmonary abnormality identified.           Assessment/Plan  * GI bleeding- (present on admission)  Assessment & Plan  Status post 1 unit transfusion  Continue IV Protonix twice daily  Hemoglobin 9.3  - I spoke with GI Dr. Lawrence, will proceed with EGD and Colonoscopy 11/28  -I was notified by bedside RN, patient did not, feels almost leaving AMA    ACP (advance care planning)- (present on admission)  Assessment & Plan  Full code    Hypokalemia- (present on admission)  Assessment & Plan  K 3.5  I am repeating labs in the morning.    Chronic systolic congestive heart failure (HCC)- (present on admission)  Assessment & Plan  Symptomatic because of severe anemia.  Status post 1 unit blood transfusion  Continue home carvedilol, losartan  Hold Eliquis    AF (atrial fibrillation) (HCC)- (present on admission)  Assessment & Plan  I will hold home apixaban for now given her GI bleeding.  Resume metoprolol with hold parameters    Type 2 diabetes mellitus with complication (HCC)- (present on admission)  Assessment & Plan  With hyperglycemia  Last glycated hemoglobin was 5.5%  Continue Accu-Cheks, insulin sliding scale and hypoglycemic protocol    Tobacco dependence- (present on admission)  Assessment & Plan  Counseled on admission    Hyperlipidemia- (present on admission)  Assessment & Plan  Continue atorvastatin    COPD (chronic obstructive pulmonary disease) (HCC)- (present on admission)  Assessment & Plan  Continue oxygen  Continue breathing treatments         VTE prophylaxis:   SCDs/TEDs   pharmacologic prophylaxis contraindicated due to GI bleed      I have performed a physical exam and reviewed and updated ROS and Plan today (11/27/2023). In  review of yesterday's note (11/26/2023), there are no changes except as documented above.      Total time spent 52 minutes.    This included my review of patient's overnight RN notes, face to face interview, physical examination, lab analysis.  Also includes my documented assessments and interventions above.  I spoke with specialist gastroenterology.  In addition, I spoke with entire care team on patient's treatment plan and DC planning on IDT rounds.

## 2023-11-27 NOTE — ED NOTES
"Chief Complaint   Patient presents with    Weakness     Pt c/o ongoing weakness    Chest Wall Pain     Pt c/o pain w/ coughing and deep breathing  States recently dx w/ rib fractures r/t coughing    Shortness of Breath     Pt stated \"can't hardly breath\"  Pt does use 2L home oxygen     BP (!) 147/85   Pulse 79   Resp 20   Ht 1.626 m (5' 4\")   Wt 65.3 kg (143 lb 15.4 oz)   SpO2 99%   BMI 24.71 kg/m²     Pt to ED via [facility] for c/o ongoing chest wall pain and shortness of breath.  Pt states was dx w/ Covid beginning of month, told has broken ribs from coughing and continues to have difficulty taking deep breaths r/t pain.  Pt does use 2L home oxygen.    "

## 2023-11-27 NOTE — ASSESSMENT & PLAN NOTE
With hyperglycemia  Last glycated hemoglobin was 5.5%  Continue Accu-Cheks, insulin sliding scale and hypoglycemic protocol

## 2023-11-27 NOTE — PROGRESS NOTES
Telemetry Shift Summary     Rhythm Vpaced,A-POD  HR Range 70-77  Ectopy rPVC,rPAC  Measurements  .16/.14/.44  Per strip printed 0400     Normal Values  Rhythm SR  HR Range    Measurements 0.12-0.20 / 0.06-0.10  / 0.30-0.52

## 2023-11-27 NOTE — CARE PLAN
The patient is Stable - Low risk of patient condition declining or worsening    Shift Goals  Clinical Goals: Blood transfusuin,blood sugar checks  Patient Goals: rest,sleep    Progress made toward(s) clinical / shift goals:  Blood transfusion initiated,safety precautions in place  Problem: Knowledge Deficit - Standard  Goal: Patient and family/care givers will demonstrate understanding of plan of care, disease process/condition, diagnostic tests and medications  Outcome: Progressing     Problem: Skin Integrity  Goal: Skin integrity is maintained or improved  Outcome: Progressing       Patient is not progressing towards the following goals:

## 2023-11-27 NOTE — ED PROVIDER NOTES
"ED Provider Note    CHIEF COMPLAINT  Chief Complaint   Patient presents with    Weakness     Pt c/o ongoing weakness    Chest Wall Pain     Pt c/o pain w/ coughing and deep breathing  States recently dx w/ rib fractures r/t coughing    Shortness of Breath     Pt stated \"can't hardly breath\"  Pt does use 2L home oxygen     EXTERNAL RECORDS REVIEWED  Patient was admitted 11/8/2023 for generalized weakness and COVID.  She was seen again in the emergency department on 11/15 with ongoing cough and chest pain.  Labs were reassuring at that time other than elevated BNP.  She had a CTA of the chest without pulmonary embolism however she did have an acute right anterior lateral sixth rib fracture.    She was last seen by cardiology in [place] in August 2023.  She has a history of paroxysmal atrial fibrillation on Eliquis, nonischemic cardiomyopathy with AICD in place, diabetes, COPD, history of methamphetamine use.  She has a history of chronic ongoing anemia and had an EGD and colonoscopy late July, small areas of angioectasias on both upper and lower.  Echo from January 2023 shows a EF of 55 to 60%.    HPI/ROS  LIMITATION TO HISTORY   Select: : None  OUTSIDE HISTORIAN(S):  None    Mckenna Sewell is a 68 y.o. female who presents to the Emergency Department with cough, chest pain, shortness of breath.  Patient has had ongoing symptoms since she was sick with COVID at the beginning of the month.  She states that she has not improved at all and symptoms seem to be worsening.  She describes a productive cough as well as anterior chest pain that is worse with deep inspiration as well as cough.  She reports ongoing shortness of breath.  She typically is on 2 to 2-1/2 L of oxygen at baseline.  She has not had any recent fevers, unsure if her legs are swollen.  She does report compliance with her medications.    PAST MEDICAL HISTORY  Past Medical History:   Diagnosis Date    MAXIMILIANO (acute kidney injury) (Formerly [facility]) 5/15/2022    " Benign essential hypertension     CAD (coronary artery disease)     MI 3/08    CHF (congestive heart failure) (Formerly [facility])     Congestive heart failure (HCC)     COPD     COPD (chronic obstructive pulmonary disease) (Formerly [facility])     COVID-19 11/9/2023    Dilated cardiomyopathy (Formerly [facility])     History of cardiac catheterization     Hypertension     Mixed hyperlipidemia     Nicotine dependence     Nicotine dependence     Type 2 diabetes mellitus with complication (Formerly [facility])         SURGICAL HISTORY  Past Surgical History:   Procedure Laterality Date    PB OPEN FIX INTER/SUBTROCH FX,IMPLNT Right 7/6/2022    Procedure: INSERTION, INTRAMEDULLARY FAVIAN, FEMUR, PROXIMAL;  Surgeon: Carl Huynh M.D.;  [facility]GERY [facility];  Service: Orthopedics    APPENDECTOMY      OTHER CARDIAC SURGERY          FAMILY HISTORY  Family History   Problem Relation Age of Onset    Heart Disease Father     Heart Disease Mother     Heart Disease Maternal Aunt     Heart Attack Maternal Aunt     Heart Disease Maternal Uncle        SOCIAL HISTORY   reports that she has been smoking cigarettes. She has a 20.0 pack-year smoking history. She has never used smokeless tobacco. She reports that she does not drink alcohol and does not use drugs.    CURRENT MEDICATIONS  Current Discharge Medication List        CONTINUE these medications which have NOT CHANGED    Details   linagliptin (TRADJENTA) 5 MG Tab tablet Take 5 mg by mouth every evening.      acetaminophen (TYLENOL) 500 MG Tab Take 500-1,000 mg by mouth every 6 hours as needed. Indications: Pain      lidocaine (LIDODERM) 5 % Patch Place 1 Patch on the skin every 24 hours.  Qty: 10 Patch, Refills: 0    Associated Diagnoses: Closed fracture of multiple ribs of right side with routine healing, subsequent encounter      fluticasone furoate-vilanterol (BREO ELLIPTA) 200-25 MCG/ACT AEROSOL POWDER, BREATH ACTIVATED Inhale 1 Puff every day.      apixaban (ELIQUIS) 5mg Tab Take 5 mg by mouth 2 times a day.      glipiZIDE  "(GLUCOTROL) 5 MG Tab Take 5 mg by mouth every day.      omeprazole (PRILOSEC) 20 MG delayed-release capsule Take 1 Capsule by mouth every day.  Qty: 30 Capsule, Refills: 0    Associated Diagnoses: COVID-19      gabapentin (NEURONTIN) 300 MG Cap Take 2 Capsules by mouth at bedtime.  Qty: 90 Capsule, Refills: 0    Associated Diagnoses: Type 2 diabetes mellitus with complication (HCC)      furosemide (LASIX) 20 MG Tab Take 1 Tablet by mouth 2 times a day.  Qty: 60 Tablet, Refills: 1    Associated Diagnoses: Acute on chronic right-sided congestive heart failure (HCC)      potassium chloride SA (KDUR) 20 MEQ Tab CR Take 1 Tablet by mouth 2 times a day.  Qty: 120 Tablet, Refills: 1    Associated Diagnoses: Hypokalemia due to excessive renal loss of potassium      Home Care Oxygen Inhale 3 L/min continuous. DME - [employer]      cyclobenzaprine (FLEXERIL) 10 mg Tab Take 1 Tablet by mouth 3 times a day as needed for Muscle Spasms or Moderate Pain.  Qty: 15 Tablet, Refills: 0    Associated Diagnoses: Chronic pain of both lower extremities      atorvastatin (LIPITOR) 10 MG Tab Take 10 mg by mouth every evening.      insulin aspart (NOVOLOG FLEXPEN) 100 UNIT/ML injection PEN Inject 2-18 Units under the skin 2 times a day. PER SLIDING SCALE:  2 units for level 150+, then additional 2 units for every 50 above.      carvedilol (COREG) 25 MG Tab TAKE ONE TABLET BY MOUTH TWICE DAILY WITH MEALS  Qty: 180 Tab, Refills: 0    Associated Diagnoses: Essential hypertension      losartan (COZAAR) 50 MG Tab Take 1 Tab by mouth 2 Times a Day.  Qty: 180 Tab, Refills: 3    Associated Diagnoses: Chronic combined systolic and diastolic congestive heart failure (HCC); Dilated cardiomyopathy (HCC); Benign essential hypertension             ALLERGIES  Codeine, Fluticasone-salmeterol, Lisinopril, Sulfa drugs, and Jardiance [empagliflozin]    PHYSICAL EXAM  /54   Pulse 71   Temp 36.8 °C (98.2 °F) (Oral)   Resp 18   Ht 1.626 m (5' 4\")   Wt " 67.5 kg (148 lb 13 oz)   SpO2 100%      Constitutional: Chronically ill however nontoxic appearing. Alert in no apparent distress.  HENT: Normocephalic, Atraumatic. Bilateral external ears normal. Nose normal.  Moist mucous membranes.  Oropharynx clear.  Eyes: Pupils are equal and reactive. Conjunctiva normal.   Neck: Supple, full range of motion  Heart: Regular rate and rhythm.  No murmurs.    Lungs: No respiratory distress, normal work of breathing. Lungs clear to auscultation bilaterally.  Abdomen Soft, no distention.  No tenderness to palpation.  Rectal: No gross blood or melena, fecal occult positive  Musculoskeletal: Atraumatic. No obvious deformities noted.  No lower extremity edema.  Skin: Warm, Dry.  No erythema, No rash.   Neurologic: Alert and oriented x3. Moving all extremities spontaneously without focal deficits.  Psychiatric: Affect normal, Mood normal, Appears appropriate and not intoxicated.      DIAGNOSTIC STUDIES / PROCEDURES    EKG  I have independently interpreted this EKG  Results for orders placed or performed during the hospital encounter of 23   EKG   Result Value Ref Range    Report       [facility] Emergency Dept.    Test Date:  2023  Pt Name:    RUPESH WONG                 Department[facility]  MRN:        [id number]                      [facility]  Gender:     Female                       Technician: GRANT  :        [id number]                   Requested By:ER TRIAGE PROTOCOL  Order #:    [id number]                    Reading MD: Muna Davison MD    Measurements  Intervals                                Axis  Rate:       67                           P:          42  KY:         49                           QRS:        121  QRSD:       151                          T:          2  QT:         477  QTc:        504    Interpretive Statements  Atrial-sensed ventricular-paced complexes  PVCs present  No STEMI  Compared to ECG 11/15/2023 11:17:19  No  significant changes  Electronically Signed On 11- 18:53:37 PST by Muna Davison MD         LABS  Labs Reviewed   CBC WITH DIFFERENTIAL - Abnormal; Notable for the following components:       Result Value    RBC 2.73 (*)     Hemoglobin 7.6 (*)     Hematocrit 24.2 (*)     MCHC 31.4 (*)     MPV 8.4 (*)     Neutrophils-Polys 77.20 (*)     Lymphocytes 13.40 (*)     All other components within normal limits    Narrative:     Biotin intake of greater than 5 mg per day may interfere with  troponin levels, causing false low values.   COMP METABOLIC PANEL - Abnormal; Notable for the following components:    Potassium 3.5 (*)     Glucose 249 (*)     Calcium 8.1 (*)     All other components within normal limits    Narrative:     Biotin intake of greater than 5 mg per day may interfere with  troponin levels, causing false low values.   TROPONIN - Abnormal; Notable for the following components:    Troponin T 24 (*)     All other components within normal limits    Narrative:     Biotin intake of greater than 5 mg per day may interfere with  troponin levels, causing false low values.   PROBRAIN NATRIURETIC PEPTIDE, NT - Abnormal; Notable for the following components:    NT-proBNP 3213 (*)     All other components within normal limits    Narrative:     Biotin intake of greater than 5 mg per day may interfere with  troponin levels, causing false low values.   PROTHROMBIN TIME - Abnormal; Notable for the following components:    PT 17.7 (*)     INR 1.39 (*)     All other components within normal limits    Narrative:     Indicate which anticoagulants the patient is on:->UNKNOWN   POCT GLUCOSE DEVICE RESULTS - Abnormal; Notable for the following components:    POC Glucose, Blood 165 (*)     All other components within normal limits   CRP QUANTITIVE (NON-CARDIAC)    Narrative:     Biotin intake of greater than 5 mg per day may interfere with  troponin levels, causing false low values.   PROCALCITONIN    Narrative:     Biotin  intake of greater than 5 mg per day may interfere with  troponin levels, causing false low values.   APTT    Narrative:     Indicate which anticoagulants the patient is on:->UNKNOWN   COD (ADULT)   ESTIMATED GFR    Narrative:     Biotin intake of greater than 5 mg per day may interfere with  troponin levels, causing false low values.   CBC WITHOUT DIFFERENTIAL   COMP METABOLIC PANEL   MAGNESIUM   RELEASE RED BLOOD CELLS   TRANSFUSE RED BLOOD CELLS-NURSING COMMUNICATION (UNITS)         RADIOLOGY  I have independently interpreted the diagnostic imaging associated with this visit and am waiting the final reading from the radiologist.   My preliminary interpretation is a follows: no infiltrate  Radiologist interpretation:   DX-CHEST-PORTABLE (1 VIEW)   Final Result      No acute cardiopulmonary abnormality identified.            COURSE & MEDICAL DECISION MAKING    ED Observation Status? Yes; I am placing the patient in to an observation status due to a diagnostic uncertainty as well as therapeutic intensity. Patient placed in observation status at 6:42 PM, 11/26/2023.     Observation plan is as follows: EKG, labs, chest xray    I have explained to the patient the risks and benefits of transfusion of blood products.  This includes, as appropriate, the risk of mild allergic reaction, hemolytic reaction, transfusion-associated lung injury, febrile reactions, circulatory or iron overload, and infection.    We discussed possible alternatives and their risks, including directed donation, autologous transfusion, and no transfusion, including IV or oral iron supplementation, as appropriate.  I believe the patient understands the risks and benefits and was able to express understanding.    Upon Reevaluation, the patient's condition has: not improved; and will be escalated to hospitalization.    Patient discharged from ED Observation status at 8:20 PM (Time) 11/26/23 (Date).     INITIAL ASSESSMENT, COURSE AND PLAN  Care  Narrative: Patient with complicated cardiac history, on anticoagulation, recent COVID infection, who presents with ongoing shortness of breath, cough and generalized weakness.  She is afebrile, hypertensive on arrival with otherwise reassuring vital signs.  She has no hypoxia or respiratory distress on her baseline 2 L of oxygen.  EKG shows paced rhythm without evidence of ischemia or arrhythmia.  Troponin is mildly elevated however at her baseline.  BNP also elevated however improved from prior.  Her chest x-ray does not show pneumonia or pulmonary edema to suggest congestive heart failure exacerbation.  Labs do not show leukocytosis to suggest infectious process, inflammatory markers are also normal.  She does have significantly worsening anemia which may be contributing to her symptoms.  Rectal exam was performed and positive for occult blood.  We will plan to transfuse due to cardiac history and associated symptoms followed by GI consultation and admission.    8:16 PM - Upon reassessment, patient is resting comfortably with stable vital signs.  No new complaints at this time.  Discussed results with patient and/or family as well as plan of care for admission.  Patient is agreeable.    ADDITIONAL PROBLEM LIST  Problem #1: Symptomatic anemia - transfuse 1 unit PRBCs, continue to closely monitor    Problem #2: Acute GI bleed -given pantoprazole, hold anticoagulation, GI has been consulted and will see the patient in the morning    Problem #3: Chronic congestive heart failure -labs at baseline, continue to monitor      DISPOSITION AND DISCUSSIONS  I have discussed management of the patient with the following physicians and RINKU's:    Dr. Coombs, gastroenterologist on-call, who will evaluate the patient in the morning  Dr. Palacio, hospitalist on-call who accepts admission of the patient      Decision tools and prescription drugs considered including, but not limited to: Antibiotics no signs of bacterial  infection .    CRITICAL CARE  The very real possibilty of a deterioration of this patient's condition required the highest level of my preparedness for sudden, emergent intervention.  I provided critical care services, which included medication orders, frequent reevaluations of the patient's condition and response to treatment, ordering and reviewing test results, and discussing the case with various consultants.  The critical care time associated with the care of the patient was 37 minutes. Review chart for interventions. This time is exclusive of any other billable procedures.       DISPOSITION:  Patient will be hospitalized by Dr. Palacio in guarded condition.      FINAL DIAGNOSIS  1. Anemia, unspecified type    2. Gastrointestinal hemorrhage, unspecified gastrointestinal hemorrhage type    3. Chronic congestive heart failure, unspecified heart failure type (HCC)

## 2023-11-27 NOTE — CONSULTS
Gastroenterology Consult Note:    REBECCA Jimenez  Date & Time note created:    11/27/2023   9:34 AM     Referring MD:  Dr. Enrique Palacio    Patient ID:  Name:             Mckenna Sewell   YOB: 1955  Age:                 68 y.o.  female   MRN:               [id number]                                                             Reason for Consult:      Weakness  Anemia    History of Present Illness:    This is a 69 yo female PMH COPD on oxygen, Atrial Fibrillation on Eliquis, HTN, CAD has pacemaker/defibrillator, Type 2 DM, HLD, GI angiectasia who is seen in consultation for symptomatic anemia, generalized weakness, cough over the past month. Diagnosed with COVID on 11/9/23. Also, Hgb:7.6 (Baseline 11 on 11/8/23). Denies any overt GI bleeding. Received 1 unit of blood on admission with current hgb:8.7. Other notable labs: Troponin: 24. BNP: 3213. INR: 1.39. CT scan chest: negative.    History of GI angioectasias diagnosed July 2023 when she was admitted for anemia when living in [place]. EGD: 2 small nonbleeding angioectasias s/p APC, 1 bleeding duodenal bulb angiectasia s/p APC. Colonoscopy: Angioectasias cecum and ascending colon s/p APC.    Review of Systems:      Review of Systems   Constitutional:  Positive for malaise/fatigue.   HENT: Negative.     Eyes: Negative.    Respiratory:  Positive for cough and shortness of breath.    Cardiovascular: Negative.    Gastrointestinal: Negative.    Genitourinary: Negative.    Musculoskeletal: Negative.    Skin: Negative.    Neurological:  Positive for weakness.   Endo/Heme/Allergies: Negative.    Psychiatric/Behavioral: Negative.               Physical Exam:  Vitals/ General Appearance:   Weight/BMI: Body mass index is 25.54 kg/m².    Vitals:    11/27/23 0400 11/27/23 0723 11/27/23 0749 11/27/23 0751   BP: 124/78   (!) 158/72   Pulse: 69 67 66 66   Resp: 18 18 18    Temp: 36.4 °C (97.6 °F)  36.7 °C (98.1 °F)    TempSrc: Oral  Oral     SpO2: 98% 97% 98%    Weight:       Height:         Oxygen Therapy:  Pulse Oximetry: 98 %, O2 (LPM): 2, O2 Delivery Device: Silicone Nasal Cannula    Physical Exam  Vitals reviewed.   Constitutional:       Appearance: She is ill-appearing.   HENT:      Head: Normocephalic and atraumatic.      Mouth/Throat:      Pharynx: Oropharynx is clear.   Eyes:      Extraocular Movements: Extraocular movements intact.      Pupils: Pupils are equal, round, and reactive to light.   Cardiovascular:      Rate and Rhythm: Normal rate and regular rhythm.      Pulses: Normal pulses.      Heart sounds: Normal heart sounds.   Pulmonary:      Effort: Pulmonary effort is normal.      Breath sounds: Normal breath sounds.   Abdominal:      General: Bowel sounds are normal.      Palpations: Abdomen is soft.      Tenderness: There is no abdominal tenderness.   Musculoskeletal:         General: Normal range of motion.      Cervical back: Normal range of motion and neck supple.   Skin:     General: Skin is warm and dry.      Capillary Refill: Capillary refill takes 2 to 3 seconds.      Coloration: Skin is pale.   Neurological:      General: No focal deficit present.      Mental Status: She is alert and oriented to person, place, and time.   Psychiatric:         Mood and Affect: Mood normal.         Behavior: Behavior normal.         Thought Content: Thought content normal.         Judgment: Judgment normal.         Past Medical History:   Past Medical History:   Diagnosis Date    MAXIMILIANO (acute kidney injury) ([facility]) 5/15/2022    Benign essential hypertension     CAD (coronary artery disease)     MI 3/08    CHF (congestive heart failure) ([facility])     Congestive heart failure ([facility])     COPD     COPD (chronic obstructive pulmonary disease) ([facility])     COVID-19 11/9/2023    Dilated cardiomyopathy ([facility])     History of cardiac catheterization     Hypertension     Mixed hyperlipidemia     Nicotine dependence     Nicotine dependence     Type 2 diabetes mellitus  with complication (HCC)        Past Surgical History:  Past Surgical History:   Procedure Laterality Date    PB OPEN FIX INTER/SUBTROCH FX,IMPLNT Right 7/6/2022    Procedure: INSERTION, INTRAMEDULLARY FAVIAN, FEMUR, PROXIMAL;  Surgeon: Carl Huynh M.D.;  Location: SURGERY [facility];  Service: Orthopedics    APPENDECTOMY      OTHER CARDIAC SURGERY         Hospital Medications:    Current Facility-Administered Medications:     atorvastatin (Lipitor) tablet 10 mg, 10 mg, Oral, Q EVENING, Enrique Palacio M.D., 10 mg at 11/26/23 2131    carvedilol (Coreg) tablet 25 mg, 25 mg, Oral, BID WITH MEALS, Enrique Palacio M.D., 25 mg at 11/27/23 0751    cyclobenzaprine (Flexeril) tablet 10 mg, 10 mg, Oral, TID PRN, Enrique Palacio M.D.    gabapentin (Neurontin) capsule 600 mg, 600 mg, Oral, QHS, Enrique Palacio M.D., 600 mg at 11/26/23 2130    losartan (Cozaar) tablet 50 mg, 50 mg, Oral, BID, Enrique Palacio M.D., 50 mg at 11/27/23 0511    acetaminophen (Tylenol) tablet 650 mg, 650 mg, Oral, Q6HRS PRN, Enrique Palacio M.D.    senna-docusate (Pericolace Or Senokot S) 8.6-50 MG per tablet 2 Tablet, 2 Tablet, Oral, BID, 2 Tablet at 11/26/23 2130 **AND** polyethylene glycol/lytes (Miralax) PACKET 1 Packet, 1 Packet, Oral, QDAY PRN **AND** magnesium hydroxide (Milk Of Magnesia) suspension 30 mL, 30 mL, Oral, QDAY PRN **AND** bisacodyl (Dulcolax) suppository 10 mg, 10 mg, Rectal, QDAY PRN, Enrique Palacio M.D.    Respiratory Therapy Consult, , Nebulization, Continuous RT, Enrique Palacio M.D.    ondansetron (Zofran) syringe/vial injection 4 mg, 4 mg, Intravenous, Q4HRS PRN, Enrique Palacio M.D.    ondansetron (Zofran ODT) dispertab 4 mg, 4 mg, Oral, Q4HRS PRN, Enrique Palacio M.D.    pantoprazole (Protonix) injection 40 mg, 40 mg, Intravenous, BID, Enrique Palacio M.D., 40 mg at 11/27/23 0511    insulin regular (HumuLIN R,NovoLIN R) injection, 2-9 Units, Subcutaneous, 4X/DAY ACHS **AND** POC blood glucose manual  result, , , Q AC AND BEDTIME(S) **AND** NOTIFY MD and PharmD, , , Once **AND** Administer 20 grams of glucose (approximately 8 ounces of fruit juice) every 15 minutes PRN FSBG less than 70 mg/dL, , , PRN **AND** dextrose 50% (D50W) injection 25 g, 25 g, Intravenous, Q15 MIN PRN, Enrique Palacio M.D.    mometasone-formoterol (Dulera) 200-5 MCG/ACT inhaler 2 Puff, 2 Puff, Inhalation, BID (RT), Enrique Palacio M.D., 2 Puff at 11/27/23 0719    Respiratory Therapy Consult, , Nebulization, Continuous RT, Enrique Palacio M.D.    albuterol inhaler 2 Puff, 2 Puff, Inhalation, Q4H PRN (RT), Enrique Palacio M.D.    Current Outpatient Medications:  Current Facility-Administered Medications   Medication Dose Route Frequency Provider Last Rate Last Admin    atorvastatin (Lipitor) tablet 10 mg  10 mg Oral Q EVENING Enrique Palacio M.D.   10 mg at 11/26/23 2131    carvedilol (Coreg) tablet 25 mg  25 mg Oral BID WITH MEALS Enrique Palacio M.D.   25 mg at 11/27/23 0751    cyclobenzaprine (Flexeril) tablet 10 mg  10 mg Oral TID PRN Enrique Palacio M.D.        gabapentin (Neurontin) capsule 600 mg  600 mg Oral QHS Enrique Palacio M.D.   600 mg at 11/26/23 2130    losartan (Cozaar) tablet 50 mg  50 mg Oral BID Enrique Palacio M.D.   50 mg at 11/27/23 0511    acetaminophen (Tylenol) tablet 650 mg  650 mg Oral Q6HRS PRN Enrique Palacio M.D.        senna-docusate (Pericolace Or Senokot S) 8.6-50 MG per tablet 2 Tablet  2 Tablet Oral BID Enrique Palacio M.D.   2 Tablet at 11/26/23 2130    And    polyethylene glycol/lytes (Miralax) PACKET 1 Packet  1 Packet Oral QDAY PRN Asem RUPA Palacio M.D.        And    magnesium hydroxide (Milk Of Magnesia) suspension 30 mL  30 mL Oral QDAY PRN Asesantosh Palacio M.D.        And    bisacodyl (Dulcolax) suppository 10 mg  10 mg Rectal QDAY PRN Asem RUPA Palacio M.D.        Respiratory Therapy Consult   Nebulization Continuous RT Asem RUPA Palacio M.D.        ondansetron (Zofran) syringe/vial  "injection 4 mg  4 mg Intravenous Q4HRS PRN Enrique Palacio M.D.        ondansetron (Zofran ODT) dispertab 4 mg  4 mg Oral Q4HRS PRN Enrique Palacio M.D.        pantoprazole (Protonix) injection 40 mg  40 mg Intravenous BID Enrique Palacio M.D.   40 mg at 11/27/23 0511    insulin regular (HumuLIN R,NovoLIN R) injection  2-9 Units Subcutaneous 4X/DAY ACHS Enrique Palacio M.D.        And    dextrose 50% (D50W) injection 25 g  25 g Intravenous Q15 MIN PRN Enrique Palacio M.D.        mometasone-formoterol (Dulera) 200-5 MCG/ACT inhaler 2 Puff  2 Puff Inhalation BID (RT) Enrique Palacio M.D.   2 Puff at 11/27/23 0719    Respiratory Therapy Consult   Nebulization Continuous RT Enrique Palacio M.D.        albuterol inhaler 2 Puff  2 Puff Inhalation Q4H PRN (RT) Enrique Palacio M.D.           Medication Allergy:  Allergies   Allergen Reactions    Codeine Rash     All over body      Fluticasone-Salmeterol Itching    Lisinopril Rash and Itching     On body      Sulfa Drugs Rash and Itching     On body      Jardiance [Empagliflozin]      \"Dizziness\" per pt       Family History:  Family History   Problem Relation Age of Onset    Heart Disease Father     Heart Disease Mother     Heart Disease Maternal Aunt     Heart Attack Maternal Aunt     Heart Disease Maternal Uncle        Social History:  Social History     Socioeconomic History    Marital status: Legally      Spouse name: Not on file    Number of children: Not on file    Years of education: Not on file    Highest education level: Not on file   Occupational History    Not on file   Tobacco Use    Smoking status: Every Day     Current packs/day: 0.50     Average packs/day: 0.5 packs/day for 40.0 years (20.0 ttl pk-yrs)     Types: Cigarettes    Smokeless tobacco: Never    Tobacco comments:     1/2 ppd   Vaping Use    Vaping Use: Never used   Substance and Sexual Activity    Alcohol use: No    Drug use: No    Sexual activity: Not on file   Other Topics Concern "    Not on file   Social History Narrative    Not on file     Social Determinants of Health     Financial Resource Strain: Not on file   Food Insecurity: Not on file   Transportation Needs: Not on file   Physical Activity: Not on file   Stress: Not on file   Social Connections: Not on file   Intimate Partner Violence: Not on file   Housing Stability: Not on file         MDM (Data Review):     Records reviewed and summarized in current documentation    Lab Data Review:  Recent Results (from the past 24 hour(s))   EKG    Collection Time: 23  6:29 PM   Result Value Ref Range    Report       [facility] Emergency Dept.    Test Date:  2023  Pt Name:    RUPESH WONG                 Department: [facility]  MRN:        [id number]                      [facility]  Gender:     Female                       Technician: GRANT  :        [id number]                   Requested By:ER TRIAGE PROTOCOL  Order #:    [id number]                    Reading MD: Muna Davison MD    Measurements  Intervals                                Axis  Rate:       67                           P:          42  NM:         49                           QRS:        121  QRSD:       151                          T:          2  QT:         477  QTc:        504    Interpretive Statements  Atrial-sensed ventricular-paced complexes  PVCs present  No STEMI  Compared to ECG 11/15/2023 11:17:19  No significant changes  Electronically Signed On 2023 18:53:37 PST by Muna Davison MD     CBC WITH DIFFERENTIAL    Collection Time: 23  6:44 PM   Result Value Ref Range    WBC 7.6 4.8 - 10.8 K/uL    RBC 2.73 (L) 4.20 - 5.40 M/uL    Hemoglobin 7.6 (L) 12.0 - 16.0 g/dL    Hematocrit 24.2 (L) 37.0 - 47.0 %    MCV 88.6 81.4 - 97.8 fL    MCH 27.8 27.0 - 33.0 pg    MCHC 31.4 (L) 32.2 - 35.5 g/dL    RDW 46.7 35.9 - 50.0 fL    Platelet Count 243 164 - 446 K/uL    MPV 8.4 (L) 9.0 - 12.9 fL    Neutrophils-Polys 77.20 (H) 44.00 - 72.00  %    Lymphocytes 13.40 (L) 22.00 - 41.00 %    Monocytes 7.00 0.00 - 13.40 %    Eosinophils 1.60 0.00 - 6.90 %    Basophils 0.30 0.00 - 1.80 %    Immature Granulocytes 0.50 0.00 - 0.90 %    Nucleated RBC 0.00 0.00 - 0.20 /100 WBC    Neutrophils (Absolute) 5.84 1.82 - 7.42 K/uL    Lymphs (Absolute) 1.01 1.00 - 4.80 K/uL    Monos (Absolute) 0.53 0.00 - 0.85 K/uL    Eos (Absolute) 0.12 0.00 - 0.51 K/uL    Baso (Absolute) 0.02 0.00 - 0.12 K/uL    Immature Granulocytes (abs) 0.04 0.00 - 0.11 K/uL    NRBC (Absolute) 0.00 K/uL   COMP METABOLIC PANEL    Collection Time: 11/26/23  6:44 PM   Result Value Ref Range    Sodium 139 135 - 145 mmol/L    Potassium 3.5 (L) 3.6 - 5.5 mmol/L    Chloride 104 96 - 112 mmol/L    Co2 25 20 - 33 mmol/L    Anion Gap 10.0 7.0 - 16.0    Glucose 249 (H) 65 - 99 mg/dL    Bun 10 8 - 22 mg/dL    Creatinine 0.81 0.50 - 1.40 mg/dL    Calcium 8.1 (L) 8.4 - 10.2 mg/dL    Correct Calcium 8.5 8.5 - 10.5 mg/dL    AST(SGOT) 15 12 - 45 U/L    ALT(SGPT) 16 2 - 50 U/L    Alkaline Phosphatase 90 30 - 99 U/L    Total Bilirubin 0.2 0.1 - 1.5 mg/dL    Albumin 3.5 3.2 - 4.9 g/dL    Total Protein 6.2 6.0 - 8.2 g/dL    Globulin 2.7 1.9 - 3.5 g/dL    A-G Ratio 1.3 g/dL   TROPONIN    Collection Time: 11/26/23  6:44 PM   Result Value Ref Range    Troponin T 24 (H) 6 - 19 ng/L   proBrain Natriuretic Peptide, NT    Collection Time: 11/26/23  6:44 PM   Result Value Ref Range    NT-proBNP 3213 (H) 0 - 125 pg/mL   CRP QUANTITIVE (NON-CARDIAC)    Collection Time: 11/26/23  6:44 PM   Result Value Ref Range    Stat C-Reactive Protein 0.45 0.00 - 0.75 mg/dL   PROCALCITONIN    Collection Time: 11/26/23  6:44 PM   Result Value Ref Range    Procalcitonin 0.05 <0.25 ng/mL   PROTHROMBIN TIME (INR)    Collection Time: 11/26/23  6:44 PM   Result Value Ref Range    PT 17.7 (H) 12.0 - 14.6 sec    INR 1.39 (H) 0.87 - 1.13   APTT    Collection Time: 11/26/23  6:44 PM   Result Value Ref Range    APTT 27.4 24.7 - 36.0 sec   ESTIMATED GFR     Collection Time: 11/26/23  6:44 PM   Result Value Ref Range    GFR (CKD-EPI) 78 >60 mL/min/1.73 m 2   COD (ADULT)    Collection Time: 11/26/23  7:10 PM   Result Value Ref Range    ABO Grouping Only A     Rh Grouping Only POS     Antibody Screen-Cod NEG     Component R       R99                 Red Cells, LR       [id number]   transfused   11/26/23   22:24      Product Type R99     Dispense Status transfused     Unit Number (Barcoded) [id number]     Product Code (Barcoded) S2819H37     Blood Type (Barcoded) 6200    POCT glucose device results    Collection Time: 11/26/23  9:39 PM   Result Value Ref Range    POC Glucose, Blood 165 (H) 65 - 99 mg/dL   CBC without Differential    Collection Time: 11/27/23  2:04 AM   Result Value Ref Range    WBC 6.3 4.8 - 10.8 K/uL    RBC 3.12 (L) 4.20 - 5.40 M/uL    Hemoglobin 8.7 (L) 12.0 - 16.0 g/dL    Hematocrit 27.7 (L) 37.0 - 47.0 %    MCV 88.8 81.4 - 97.8 fL    MCH 27.9 27.0 - 33.0 pg    MCHC 31.4 (L) 32.2 - 35.5 g/dL    RDW 48.2 35.9 - 50.0 fL    Platelet Count 237 164 - 446 K/uL    MPV 9.0 9.0 - 12.9 fL   Comp Metabolic Panel (CMP)    Collection Time: 11/27/23  2:04 AM   Result Value Ref Range    Sodium 137 135 - 145 mmol/L    Potassium 4.1 3.6 - 5.5 mmol/L    Chloride 104 96 - 112 mmol/L    Co2 25 20 - 33 mmol/L    Anion Gap 8.0 7.0 - 16.0    Glucose 202 (H) 65 - 99 mg/dL    Bun 7 (L) 8 - 22 mg/dL    Creatinine 0.69 0.50 - 1.40 mg/dL    Calcium 8.1 (L) 8.4 - 10.2 mg/dL    Correct Calcium 8.7 8.5 - 10.5 mg/dL    AST(SGOT) 14 12 - 45 U/L    ALT(SGPT) 13 2 - 50 U/L    Alkaline Phosphatase 86 30 - 99 U/L    Total Bilirubin 0.7 0.1 - 1.5 mg/dL    Albumin 3.2 3.2 - 4.9 g/dL    Total Protein 5.9 (L) 6.0 - 8.2 g/dL    Globulin 2.7 1.9 - 3.5 g/dL    A-G Ratio 1.2 g/dL   Magnesium    Collection Time: 11/27/23  2:04 AM   Result Value Ref Range    Magnesium 2.1 1.5 - 2.5 mg/dL   ESTIMATED GFR    Collection Time: 11/27/23  2:04 AM   Result Value Ref Range    GFR (CKD-EPI) 93 >60  SCD mL/min/1.73 m 2   POCT glucose device results    Collection Time: 11/27/23  5:59 AM   Result Value Ref Range    POC Glucose, Blood 161 (H) 65 - 99 mg/dL       Imaging/Procedures Review:      DX-CHEST-PORTABLE (1 VIEW)   Final Result      No acute cardiopulmonary abnormality identified.           MDM (Assessment and Plan):     Patient Active Problem List    Diagnosis Date Noted    Hypertensive urgency 11/26/2023    Chronic systolic congestive heart failure (HCC) 11/26/2023    GI bleeding 11/26/2023    Hypokalemia 11/26/2023    ACP (advance care planning) 11/26/2023    COVID-19 11/09/2023    AF (atrial fibrillation) ([facility]) 11/09/2023    Hypokalemia due to excessive renal loss of potassium 12/05/2022    Acute on chronic respiratory failure with hypoxia ([facility]) 12/03/2022    Acute respiratory failure with hypoxia ([facility]) 12/03/2022    Anemia 07/08/2022    Thrombocytopenia ([facility]) 07/08/2022    Femur fracture ([facility]) 07/06/2022    Other fracture of right femur, initial encounter for closed fracture ([facility]) 07/06/2022    Methamphetamine abuse ([facility]) 07/06/2022    MAXIMILIANO (acute kidney injury) ([facility]) 05/15/2022    Type 2 diabetes mellitus with complication ([facility])     Biventricular ICD (implantable cardioverter-defibrillator) in place     Benign essential hypertension     History of cardiac catheterization     COPD (chronic obstructive pulmonary disease) (HCC)     CHF (congestive heart failure) (HCC)     Dilated cardiomyopathy (HCC)     Hyperlipidemia     Tobacco dependence      This is a 67 yo female,recently moved from [place] to [place], [place], who was admitted to the hospital with a 1 month history of progressive weakness, coughing and SOB. Diagnosed with COVID on 11/9/23. Labs 11/26/23 showed a drop in Hgb 7.6 (11 on 11/8/23). Received 1 unit of blood on admission, with current Hgb: 8.6. History of GI angioectasias July 2023 had EGD/colonoscopy with cauterization of angioectasias of the stomach, duodenal bulb, cecum and ascending  colon. Denies overt GI Bleed. Takes Eliquis for atrial fibrillation.    ASSESSMENT:  Symptomatic anemia likely from angioectasias  COPD on oxygen  COVID  CAD s/p pacemaker and defibrillator  Atrial fibrillation on Eliquis  Type 2 DM    PLAN:  Clear liquids, no reds  NPO after midnight  Colonoscopy/EGD tomorrow  Trend Hgb and transfuse >7  Pantoprazole 40 mg IV daily  Hold Eliquis    Thank your for the opportunity to assist in the care of your patient.  Please call for any questions or concerns.    RIN Jimenez.

## 2023-11-28 ENCOUNTER — ANESTHESIA (OUTPATIENT)
Dept: SURGERY | Facility: MEDICAL CENTER | Age: 68
DRG: 378 | End: 2023-11-28
Payer: MEDICARE

## 2023-11-28 ENCOUNTER — ANESTHESIA EVENT (OUTPATIENT)
Dept: SURGERY | Facility: MEDICAL CENTER | Age: 68
DRG: 378 | End: 2023-11-28
Payer: MEDICARE

## 2023-11-28 PROBLEM — Q27.30 AVM (ARTERIOVENOUS MALFORMATION): Status: ACTIVE | Noted: 2023-11-28

## 2023-11-28 LAB
ALBUMIN SERPL BCP-MCNC: 3.3 G/DL (ref 3.2–4.9)
ANION GAP SERPL CALC-SCNC: 8 MMOL/L (ref 7–16)
BUN SERPL-MCNC: 4 MG/DL (ref 8–22)
BUN SERPL-MCNC: 5 MG/DL (ref 8–22)
CALCIUM ALBUM COR SERPL-MCNC: 9 MG/DL (ref 8.5–10.5)
CALCIUM SERPL-MCNC: 8.1 MG/DL (ref 8.4–10.2)
CALCIUM SERPL-MCNC: 8.4 MG/DL (ref 8.4–10.2)
CHLORIDE SERPL-SCNC: 106 MMOL/L (ref 96–112)
CHLORIDE SERPL-SCNC: 107 MMOL/L (ref 96–112)
CO2 SERPL-SCNC: 22 MMOL/L (ref 20–33)
CO2 SERPL-SCNC: 23 MMOL/L (ref 20–33)
CREAT SERPL-MCNC: 0.52 MG/DL (ref 0.5–1.4)
CREAT SERPL-MCNC: 0.66 MG/DL (ref 0.5–1.4)
ERYTHROCYTE [DISTWIDTH] IN BLOOD BY AUTOMATED COUNT: 49.7 FL (ref 35.9–50)
FERRITIN SERPL-MCNC: 30.8 NG/ML (ref 10–291)
GFR SERPLBLD CREATININE-BSD FMLA CKD-EPI: 100 ML/MIN/1.73 M 2
GFR SERPLBLD CREATININE-BSD FMLA CKD-EPI: 94 ML/MIN/1.73 M 2
GLUCOSE BLD STRIP.AUTO-MCNC: 125 MG/DL (ref 65–99)
GLUCOSE BLD STRIP.AUTO-MCNC: 184 MG/DL (ref 65–99)
GLUCOSE BLD STRIP.AUTO-MCNC: 197 MG/DL (ref 65–99)
GLUCOSE BLD STRIP.AUTO-MCNC: 202 MG/DL (ref 65–99)
GLUCOSE BLD STRIP.AUTO-MCNC: 257 MG/DL (ref 65–99)
GLUCOSE SERPL-MCNC: 120 MG/DL (ref 65–99)
GLUCOSE SERPL-MCNC: 205 MG/DL (ref 65–99)
HCT VFR BLD AUTO: 30.3 % (ref 37–47)
HGB BLD-MCNC: 9.4 G/DL (ref 12–16)
IRON SATN MFR SERPL: 7 % (ref 15–55)
IRON SERPL-MCNC: 23 UG/DL (ref 40–170)
MAGNESIUM SERPL-MCNC: 2 MG/DL (ref 1.5–2.5)
MAGNESIUM SERPL-MCNC: 2.1 MG/DL (ref 1.5–2.5)
MCH RBC QN AUTO: 27.7 PG (ref 27–33)
MCHC RBC AUTO-ENTMCNC: 31 G/DL (ref 32.2–35.5)
MCV RBC AUTO: 89.4 FL (ref 81.4–97.8)
PHOSPHATE SERPL-MCNC: 2.3 MG/DL (ref 2.5–4.5)
PHOSPHATE SERPL-MCNC: 2.6 MG/DL (ref 2.5–4.5)
PLATELET # BLD AUTO: 214 K/UL (ref 164–446)
PMV BLD AUTO: 9.1 FL (ref 9–12.9)
POTASSIUM SERPL-SCNC: 3.5 MMOL/L (ref 3.6–5.5)
POTASSIUM SERPL-SCNC: 4.3 MMOL/L (ref 3.6–5.5)
RBC # BLD AUTO: 3.39 M/UL (ref 4.2–5.4)
SODIUM SERPL-SCNC: 137 MMOL/L (ref 135–145)
SODIUM SERPL-SCNC: 139 MMOL/L (ref 135–145)
TIBC SERPL-MCNC: 318 UG/DL (ref 250–450)
UIBC SERPL-MCNC: 295 UG/DL (ref 110–370)
WBC # BLD AUTO: 6.7 K/UL (ref 4.8–10.8)

## 2023-11-28 PROCEDURE — 700111 HCHG RX REV CODE 636 W/ 250 OVERRIDE (IP): Performed by: HOSPITALIST

## 2023-11-28 PROCEDURE — 700101 HCHG RX REV CODE 250: Performed by: INTERNAL MEDICINE

## 2023-11-28 PROCEDURE — 700111 HCHG RX REV CODE 636 W/ 250 OVERRIDE (IP): Performed by: STUDENT IN AN ORGANIZED HEALTH CARE EDUCATION/TRAINING PROGRAM

## 2023-11-28 PROCEDURE — A9270 NON-COVERED ITEM OR SERVICE: HCPCS | Performed by: INTERNAL MEDICINE

## 2023-11-28 PROCEDURE — 160203 HCHG ENDO MINUTES - 1ST 30 MINS LEVEL 4: Performed by: INTERNAL MEDICINE

## 2023-11-28 PROCEDURE — 700105 HCHG RX REV CODE 258: Performed by: STUDENT IN AN ORGANIZED HEALTH CARE EDUCATION/TRAINING PROGRAM

## 2023-11-28 PROCEDURE — 160048 HCHG OR STATISTICAL LEVEL 1-5: Performed by: INTERNAL MEDICINE

## 2023-11-28 PROCEDURE — A9270 NON-COVERED ITEM OR SERVICE: HCPCS | Performed by: HOSPITALIST

## 2023-11-28 PROCEDURE — 80048 BASIC METABOLIC PNL TOTAL CA: CPT

## 2023-11-28 PROCEDURE — 160009 HCHG ANES TIME/MIN: Performed by: INTERNAL MEDICINE

## 2023-11-28 PROCEDURE — 94760 N-INVAS EAR/PLS OXIMETRY 1: CPT

## 2023-11-28 PROCEDURE — 0D5A8ZZ DESTRUCTION OF JEJUNUM, VIA NATURAL OR ARTIFICIAL OPENING ENDOSCOPIC: ICD-10-PCS | Performed by: INTERNAL MEDICINE

## 2023-11-28 PROCEDURE — 0D568ZZ DESTRUCTION OF STOMACH, VIA NATURAL OR ARTIFICIAL OPENING ENDOSCOPIC: ICD-10-PCS | Performed by: INTERNAL MEDICINE

## 2023-11-28 PROCEDURE — 83540 ASSAY OF IRON: CPT

## 2023-11-28 PROCEDURE — 85027 COMPLETE CBC AUTOMATED: CPT

## 2023-11-28 PROCEDURE — 83550 IRON BINDING TEST: CPT

## 2023-11-28 PROCEDURE — 160035 HCHG PACU - 1ST 60 MINS PHASE I: Performed by: INTERNAL MEDICINE

## 2023-11-28 PROCEDURE — 160002 HCHG RECOVERY MINUTES (STAT): Performed by: INTERNAL MEDICINE

## 2023-11-28 PROCEDURE — 0DJD8ZZ INSPECTION OF LOWER INTESTINAL TRACT, VIA NATURAL OR ARTIFICIAL OPENING ENDOSCOPIC: ICD-10-PCS | Performed by: INTERNAL MEDICINE

## 2023-11-28 PROCEDURE — 94640 AIRWAY INHALATION TREATMENT: CPT

## 2023-11-28 PROCEDURE — 36415 COLL VENOUS BLD VENIPUNCTURE: CPT

## 2023-11-28 PROCEDURE — 99232 SBSQ HOSP IP/OBS MODERATE 35: CPT | Performed by: INTERNAL MEDICINE

## 2023-11-28 PROCEDURE — 80069 RENAL FUNCTION PANEL: CPT

## 2023-11-28 PROCEDURE — 0D598ZZ DESTRUCTION OF DUODENUM, VIA NATURAL OR ARTIFICIAL OPENING ENDOSCOPIC: ICD-10-PCS | Performed by: INTERNAL MEDICINE

## 2023-11-28 PROCEDURE — 83735 ASSAY OF MAGNESIUM: CPT

## 2023-11-28 PROCEDURE — 82962 GLUCOSE BLOOD TEST: CPT | Mod: 91

## 2023-11-28 PROCEDURE — C9113 INJ PANTOPRAZOLE SODIUM, VIA: HCPCS | Performed by: HOSPITALIST

## 2023-11-28 PROCEDURE — 82728 ASSAY OF FERRITIN: CPT

## 2023-11-28 PROCEDURE — 84100 ASSAY OF PHOSPHORUS: CPT

## 2023-11-28 PROCEDURE — 700102 HCHG RX REV CODE 250 W/ 637 OVERRIDE(OP): Performed by: HOSPITALIST

## 2023-11-28 PROCEDURE — 770020 HCHG ROOM/CARE - TELE (206)

## 2023-11-28 PROCEDURE — 700102 HCHG RX REV CODE 250 W/ 637 OVERRIDE(OP): Performed by: INTERNAL MEDICINE

## 2023-11-28 PROCEDURE — 700101 HCHG RX REV CODE 250: Performed by: STUDENT IN AN ORGANIZED HEALTH CARE EDUCATION/TRAINING PROGRAM

## 2023-11-28 PROCEDURE — 160208 HCHG ENDO MINUTES - EA ADDL 1 MIN LEVEL 4: Performed by: INTERNAL MEDICINE

## 2023-11-28 RX ORDER — ONDANSETRON 2 MG/ML
4 INJECTION INTRAMUSCULAR; INTRAVENOUS
Status: DISCONTINUED | OUTPATIENT
Start: 2023-11-28 | End: 2023-11-28 | Stop reason: HOSPADM

## 2023-11-28 RX ORDER — SODIUM CHLORIDE AND POTASSIUM CHLORIDE 300; 900 MG/100ML; MG/100ML
INJECTION, SOLUTION INTRAVENOUS CONTINUOUS
Status: DISCONTINUED | OUTPATIENT
Start: 2023-11-28 | End: 2023-11-29 | Stop reason: HOSPADM

## 2023-11-28 RX ORDER — HALOPERIDOL 5 MG/ML
1 INJECTION INTRAMUSCULAR
Status: DISCONTINUED | OUTPATIENT
Start: 2023-11-28 | End: 2023-11-28 | Stop reason: HOSPADM

## 2023-11-28 RX ORDER — DIPHENHYDRAMINE HYDROCHLORIDE 50 MG/ML
12.5 INJECTION INTRAMUSCULAR; INTRAVENOUS
Status: DISCONTINUED | OUTPATIENT
Start: 2023-11-28 | End: 2023-11-28 | Stop reason: HOSPADM

## 2023-11-28 RX ORDER — SODIUM CHLORIDE, SODIUM LACTATE, POTASSIUM CHLORIDE, CALCIUM CHLORIDE 600; 310; 30; 20 MG/100ML; MG/100ML; MG/100ML; MG/100ML
INJECTION, SOLUTION INTRAVENOUS
Status: DISCONTINUED | OUTPATIENT
Start: 2023-11-28 | End: 2023-11-28 | Stop reason: SURG

## 2023-11-28 RX ORDER — OMEPRAZOLE 20 MG/1
20 CAPSULE, DELAYED RELEASE ORAL DAILY
Status: DISCONTINUED | OUTPATIENT
Start: 2023-11-28 | End: 2023-11-29 | Stop reason: HOSPADM

## 2023-11-28 RX ORDER — LIDOCAINE HYDROCHLORIDE 20 MG/ML
INJECTION, SOLUTION EPIDURAL; INFILTRATION; INTRACAUDAL; PERINEURAL PRN
Status: DISCONTINUED | OUTPATIENT
Start: 2023-11-28 | End: 2023-11-28 | Stop reason: SURG

## 2023-11-28 RX ORDER — FERROUS SULFATE 325(65) MG
325 TABLET ORAL
Status: DISCONTINUED | OUTPATIENT
Start: 2023-11-29 | End: 2023-11-29 | Stop reason: HOSPADM

## 2023-11-28 RX ADMIN — OMEPRAZOLE 20 MG: 20 CAPSULE, DELAYED RELEASE ORAL at 16:27

## 2023-11-28 RX ADMIN — ACETAMINOPHEN 650 MG: 325 TABLET ORAL at 08:31

## 2023-11-28 RX ADMIN — INSULIN HUMAN 2 UNITS: 100 INJECTION, SOLUTION PARENTERAL at 12:02

## 2023-11-28 RX ADMIN — PROPOFOL 120 MCG/KG/MIN: 10 INJECTION, EMULSION INTRAVENOUS at 15:01

## 2023-11-28 RX ADMIN — LIDOCAINE HYDROCHLORIDE 80 MG: 20 INJECTION, SOLUTION EPIDURAL; INFILTRATION; INTRACAUDAL at 15:01

## 2023-11-28 RX ADMIN — ATORVASTATIN CALCIUM 10 MG: 10 TABLET, FILM COATED ORAL at 17:32

## 2023-11-28 RX ADMIN — INSULIN HUMAN 5 UNITS: 100 INJECTION, SOLUTION PARENTERAL at 21:11

## 2023-11-28 RX ADMIN — GABAPENTIN 600 MG: 300 CAPSULE ORAL at 21:05

## 2023-11-28 RX ADMIN — LIDOCAINE 2 PATCH: 50 PATCH TOPICAL at 16:26

## 2023-11-28 RX ADMIN — INSULIN HUMAN 2 UNITS: 100 INJECTION, SOLUTION PARENTERAL at 16:38

## 2023-11-28 RX ADMIN — CARVEDILOL 25 MG: 25 TABLET, FILM COATED ORAL at 16:28

## 2023-11-28 RX ADMIN — POTASSIUM CHLORIDE AND SODIUM CHLORIDE: 900; 300 INJECTION, SOLUTION INTRAVENOUS at 22:33

## 2023-11-28 RX ADMIN — POTASSIUM CHLORIDE AND SODIUM CHLORIDE: 900; 300 INJECTION, SOLUTION INTRAVENOUS at 09:11

## 2023-11-28 RX ADMIN — MOMETASONE FUROATE AND FORMOTEROL FUMARATE DIHYDRATE 2 PUFF: 200; 5 AEROSOL RESPIRATORY (INHALATION) at 07:34

## 2023-11-28 RX ADMIN — PANTOPRAZOLE SODIUM 40 MG: 40 INJECTION, POWDER, LYOPHILIZED, FOR SOLUTION INTRAVENOUS at 04:44

## 2023-11-28 RX ADMIN — SODIUM CHLORIDE, POTASSIUM CHLORIDE, SODIUM LACTATE AND CALCIUM CHLORIDE: 600; 310; 30; 20 INJECTION, SOLUTION INTRAVENOUS at 14:57

## 2023-11-28 RX ADMIN — MOMETASONE FUROATE AND FORMOTEROL FUMARATE DIHYDRATE 2 PUFF: 200; 5 AEROSOL RESPIRATORY (INHALATION) at 20:06

## 2023-11-28 RX ADMIN — CARVEDILOL 25 MG: 25 TABLET, FILM COATED ORAL at 08:28

## 2023-11-28 ASSESSMENT — COGNITIVE AND FUNCTIONAL STATUS - GENERAL
STANDING UP FROM CHAIR USING ARMS: A LITTLE
CLIMB 3 TO 5 STEPS WITH RAILING: A LITTLE
WALKING IN HOSPITAL ROOM: A LITTLE
SUGGESTED CMS G CODE MODIFIER DAILY ACTIVITY: CJ
MOVING TO AND FROM BED TO CHAIR: A LITTLE
DAILY ACTIVITIY SCORE: 21
DRESSING REGULAR UPPER BODY CLOTHING: A LITTLE
MOVING FROM LYING ON BACK TO SITTING ON SIDE OF FLAT BED: A LITTLE
HELP NEEDED FOR BATHING: A LITTLE
SUGGESTED CMS G CODE MODIFIER MOBILITY: CK
MOBILITY SCORE: 19
TOILETING: A LITTLE

## 2023-11-28 ASSESSMENT — ENCOUNTER SYMPTOMS
DIARRHEA: 0
CHILLS: 0
PALPITATIONS: 0
NAUSEA: 0
CONSTIPATION: 0
BACK PAIN: 0
COUGH: 0
SHORTNESS OF BREATH: 0
HEADACHES: 0
VOMITING: 0
ABDOMINAL PAIN: 0
DIZZINESS: 0
FEVER: 0

## 2023-11-28 ASSESSMENT — PAIN DESCRIPTION - PAIN TYPE
TYPE: ACUTE PAIN
TYPE: ACUTE PAIN

## 2023-11-28 ASSESSMENT — FIBROSIS 4 INDEX: FIB4 SCORE: 1.27

## 2023-11-28 NOTE — HOSPITAL COURSE
History of COPD, hypertension, hyperlipidemia, type 2 diabetes, A-fib on anticoagulation with Eliquis who presented with weakness and shortness of breath after a recent diagnosis of COVID.  She has been feeling progressively weak since her diagnosis and was found to have hemoglobin of 7.6 on arrival.  She was transfused 1 unit RBC after she tested positive for occult blood in the ER.  GI was consulted and she was given a bowel prep.

## 2023-11-28 NOTE — CARE PLAN
The patient is Stable - Low risk of patient condition declining or worsening    Shift Goals  Clinical Goals: Encourage bowel prep, Colonoscopy/EGD tomorrow at 1500  Patient Goals: Stop bowel prep, rest  Family Goals: Per daughter request, consult with CM    Progress made toward(s) clinical / shift goals:    Problem: Knowledge Deficit - Standard  Goal: Patient and family/care givers will demonstrate understanding of plan of care, disease process/condition, diagnostic tests and medications  Outcome: Progressing     Problem: Fall Risk  Goal: Patient will remain free from falls  Outcome: Progressing       Patient is not progressing towards the following goals:

## 2023-11-28 NOTE — OR SURGEON
Immediate Post OP Note    PreOp Diagnosis: Iron deficiency anemia      PostOp Diagnosis: Gastric, duodenal, jejunal and colonic AVMs      Procedure(s):  PUSH ENTEROSCOPY with APC - Wound Class: Clean Contaminated  COLONOSCOPY - Wound Class: Clean Contaminated    Surgeon(s):  Mauricio Lawrence M.D.    Anesthesiologist/Type of Anesthesia:  Anesthesiologist: Atif Vallecillo M.D./PRICILA    Surgical Staff:  Circulator: Mehran Givens R.N.  Endoscopy Technician: Kash Waldrop    Specimens removed if any:  * No specimens in log *    Estimated Blood Loss: None    Findings:   2 small gastric AVMs, ablated with APC  Approximately 8 small duodenal and jejunal AVMs ablated with APC.  One AVM in jejunum with some oozing with APC  Nonbleeding cecal partially treated AVM not ablated during this exam due to fair prep  Fair to poor prep right colon  Sigmoid diverticulosis    Complications: None        11/28/2023 3:33 PM Mauricio Lawrence M.D.

## 2023-11-28 NOTE — PROGRESS NOTES
Telemetry Shift Summary     Rhythm: VPOD/ APOD w/ BBB  HR: 66-72  Ectopy: fPVC, rCoup    Measurements: 0.14/0.14/0.44    Normal Values  Rhythm: SR  HR:   Measurements: 0.12-0.20/0.08-0.10/0.30-0.52

## 2023-11-28 NOTE — PROGRESS NOTES
Brief GI Progress Note:    No overt GI bleeding and Hgb stable after transfusion.  Iron levels low consistent with iron deficiency anemia.  Suspect intestinal AVMs as before.  OK for push enteroscopy and colonoscopy today with anesthesia.

## 2023-11-28 NOTE — CARE PLAN
The patient is Stable - Low risk of patient condition declining or worsening    Shift Goals  Clinical Goals: monitor labs, GI consult  Patient Goals: food, rest  Family Goals: no    Progress made toward(s) clinical / shift goals:  yes      Patient is not progressing towards the following goals:

## 2023-11-28 NOTE — ANESTHESIA PREPROCEDURE EVALUATION
Case: [id number] Date/Time: 11/28/23 1445    Procedures:       GASTROSCOPY      COLONOSCOPY    Location:  ENDOSCOPIC ULTRASOUND ROOM / SURGERY [facility]    Surgeons: Mauricio Lawrence M.D.          EF 35%  O2 dependent 2-3 LPM  Relevant Problems   PULMONARY   (positive) COPD (chronic obstructive pulmonary disease) (HCC)      CARDIAC   (positive) AF (atrial fibrillation) ([facility])   (positive) Benign essential hypertension   (positive) CHF (congestive heart failure) ([facility])   (positive) Chronic systolic congestive heart failure (HCC)   (positive) Hypertensive urgency         (positive) MAXIMILIANO (acute kidney injury) ([facility])      ENDO   (positive) Type 2 diabetes mellitus with complication ([facility])       Physical Exam    Airway   Mallampati: II  TM distance: >3 FB  Neck ROM: full       Cardiovascular - normal exam  Rhythm: regular  Rate: normal     Dental - normal exam           Pulmonary - normal exam     Abdominal    Neurological - normal exam                   Anesthesia Plan    ASA 4   ASA physical status 4 criteria: severe reduction of ejection fractions    Plan - MAC               Induction: intravenous    Postoperative Plan: Postoperative administration of opioids is intended.    Pertinent diagnostic labs and testing reviewed    Informed Consent:    Anesthetic plan and risks discussed with patient.    Use of blood products discussed with: patient whom consented to blood products.

## 2023-11-28 NOTE — OR NURSING
1537: To PACU from EUS via roger, restless, VELÁSQUEZ, denies pain/nausea, respirations spontaneous and non-labored. Abdo soft. O2 decreased to n/c  1550: Elizabeth care given and linens changed for bowel incontinence.  1555: s/b Dr Lawrence  1600: Tolerated po water, now sipping juice  1605: Tolerating po juice. O2 back to pre-op level. Meets criteria to transfer to floor.  1615: Transferred on O2 tank @ 300+ L

## 2023-11-28 NOTE — ANESTHESIA POSTPROCEDURE EVALUATION
Patient: Mckenna Sewell    Procedure Summary       Date: 11/28/23 Room / Location:  ENDOSCOPIC ULTRASOUND ROOM / SURGERY [facility]    Anesthesia Start: 1457 Anesthesia Stop: 1540    Procedures:       GASTROSCOPY      COLONOSCOPY Diagnosis:       AVM (arteriovenous malformation) of small bowel, acquired      (AVM (arteriovenous malformation) of small bowel, acquired [[id number]])    Surgeons: Mauricio Lawrence M.D. Responsible Provider: Atif Vallecillo M.D.    Anesthesia Type: MAC ASA Status: 4            Final Anesthesia Type: MAC  Last vitals  BP   Blood Pressure : (!) 148/67    Temp   36.6 °C (97.9 °F)    Pulse   74   Resp   18    SpO2   97 %      Anesthesia Post Evaluation    Patient location during evaluation: PACU  Patient participation: complete - patient participated  Level of consciousness: awake and alert    Airway patency: patent  Anesthetic complications: no  Cardiovascular status: hemodynamically stable  Respiratory status: acceptable  Hydration status: euvolemic    PONV: none          No notable events documented.     Nurse Pain Score: 0 (NPRS)

## 2023-11-28 NOTE — PROGRESS NOTES
Around 18:15 pt refuses to continue bowel prep. BM mostly clear with scattered corn size pieces. MD made aware.

## 2023-11-28 NOTE — PROGRESS NOTES
"Hospital Medicine Daily Progress Note    Date of Service  11/28/2023    Chief Complaint  Mckenna Sewell is a 68 y.o. female admitted 11/26/2023 with   Chief Complaint   Patient presents with    Weakness     Pt c/o ongoing weakness    Chest Wall Pain     Pt c/o pain w/ coughing and deep breathing  States recently dx w/ rib fractures r/t coughing    Shortness of Breath     Pt stated \"can't hardly breath\"  Pt does use 2L home oxygen     Hospital Course  No notes on file    Interval Problem Update  11/27: GI recommended EGD colonoscopy tomorrow  11/28: Hemoglobin stable at 9.4 after 1 unit RBC transfused on admission.  No reports of blood in the stool, tolerated bowel prep.  Planning for EGD colonoscopy today    I have discussed this patient's plan of care and discharge plan at IDT rounds today with Case Management, Nursing, Nursing leadership, and other members of the IDT team.    Consultants/Specialty  GI    Code Status  Full Code    Disposition  The patient is not medically cleared for discharge to home or a post-acute facility.  Anticipate discharge to: home with close outpatient follow-up    I have placed the appropriate orders for post-discharge needs.    Review of Systems  Review of Systems   Constitutional:  Positive for malaise/fatigue. Negative for chills and fever.   Respiratory:  Negative for cough and shortness of breath.    Cardiovascular:  Negative for chest pain and palpitations.   Gastrointestinal:  Positive for melena. Negative for abdominal pain, constipation, diarrhea, nausea and vomiting.   Musculoskeletal:  Negative for back pain and joint pain.   Neurological:  Negative for dizziness and headaches.   All other systems reviewed and are negative.       Physical Exam  Temp:  [36.4 °C (97.5 °F)-37 °C (98.6 °F)] 36.6 °C (97.9 °F)  Pulse:  [65-80] 74  Resp:  [17-22] 18  BP: (135-166)/(59-81) 148/67  SpO2:  [96 %-100 %] 97 %    Physical Exam  Vitals and nursing note reviewed.   Constitutional:       " General: She is not in acute distress.     Appearance: Normal appearance. She is not ill-appearing.   HENT:      Head: Normocephalic and atraumatic.   Eyes:      General: No scleral icterus.     Extraocular Movements: Extraocular movements intact.   Cardiovascular:      Rate and Rhythm: Normal rate.      Pulses: Normal pulses.      Heart sounds: Normal heart sounds. No murmur heard.  Pulmonary:      Effort: Pulmonary effort is normal. No respiratory distress.      Breath sounds: Normal breath sounds.      Comments: On oxygen supplementation  Abdominal:      General: Abdomen is flat. Bowel sounds are normal. There is no distension.      Palpations: Abdomen is soft.   Musculoskeletal:         General: No swelling or tenderness. Normal range of motion.      Cervical back: Normal range of motion and neck supple.   Skin:     General: Skin is warm.      Capillary Refill: Capillary refill takes less than 2 seconds.      Coloration: Skin is not jaundiced.      Findings: No erythema.   Neurological:      General: No focal deficit present.      Mental Status: She is alert and oriented to person, place, and time. Mental status is at baseline.      Motor: No weakness.   Psychiatric:         Mood and Affect: Mood normal.         Behavior: Behavior normal.         Thought Content: Thought content normal.         Judgment: Judgment normal.         Fluids  No intake or output data in the 24 hours ending 11/28/23 1503      Laboratory  Recent Labs     11/26/23  1844 11/27/23  0204 11/27/23  1054 11/27/23  1917 11/28/23  0112   WBC 7.6 6.3  --   --  6.7   RBC 2.73* 3.12*  --   --  3.39*   HEMOGLOBIN 7.6* 8.7* 9.3* 9.4* 9.4*   HEMATOCRIT 24.2* 27.7*  --   --  30.3*   MCV 88.6 88.8  --   --  89.4   MCH 27.8 27.9  --   --  27.7   MCHC 31.4* 31.4*  --   --  31.0*   RDW 46.7 48.2  --   --  49.7   PLATELETCT 243 237  --   --  214   MPV 8.4* 9.0  --   --  9.1       Recent Labs     11/26/23  1844 11/27/23  0204 11/28/23  0112   SODIUM 139 137  139   POTASSIUM 3.5* 4.1 3.5*   CHLORIDE 104 104 106   CO2 25 25 23   GLUCOSE 249* 202* 120*   BUN 10 7* 4*   CREATININE 0.81 0.69 0.52   CALCIUM 8.1* 8.1* 8.4       Recent Labs     11/26/23  1844   APTT 27.4   INR 1.39*                 Imaging  DX-CHEST-PORTABLE (1 VIEW)   Final Result      No acute cardiopulmonary abnormality identified.           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 and has required cautery  Status post 1 unit transfusion with improvement of hemoglobin to 9.4  Continue IV Protonix twice daily  GI planning for EGD and Colonoscopy 11/28      AVM (arteriovenous malformation)  Assessment & Plan  History of intestinal AVM status post cautery in the past  Plan for EGD with enteroscopy as well as colonoscopy today  Status post 1 unit RBCs  Iron studies consistent with deficiency  Start oral iron supplement    ACP (advance care planning)- (present on admission)  Assessment & Plan  Full code    Hypokalemia- (present on admission)  Assessment & Plan  K 3.5  I am repeating labs in the morning.    Chronic systolic congestive heart failure (HCC)- (present on admission)  Assessment & Plan  Symptomatic because of severe anemia.  Status post 1 unit blood transfusion this admit w improvement of hgb  Continue home carvedilol, losartan  Hold Eliquis    AF (atrial fibrillation) (HCC)- (present on admission)  Assessment & Plan  I will hold home apixaban for now given her GI bleeding.  Resume metoprolol with hold parameters    Type 2 diabetes mellitus with complication (HCC)- (present on admission)  Assessment & Plan  With hyperglycemia  Last glycated hemoglobin was 5.5%  Continue Accu-Cheks, insulin sliding scale and hypoglycemic protocol    Tobacco dependence- (present on admission)  Assessment & Plan  Counseled on admission    Hyperlipidemia- (present on admission)  Assessment & Plan  Continue atorvastatin    COPD (chronic  obstructive pulmonary disease) (HCC)- (present on admission)  Assessment & Plan  Continue oxygen  Continue breathing treatments         VTE prophylaxis: Hold due to GI bleeding    I have performed a physical exam and reviewed and updated ROS and Plan today (11/28/2023). In review of yesterday's note (11/27/2023), there are no changes except as documented above.

## 2023-11-28 NOTE — ASSESSMENT & PLAN NOTE
History of intestinal AVM status post cautery in the past  Plan for EGD with enteroscopy as well as colonoscopy today  Status post 1 unit RBCs  Iron studies consistent with deficiency  Start oral iron supplement

## 2023-11-28 NOTE — PROGRESS NOTES
Notified by monitor tech about frequent runs of Vtach. Two occurences of 5 bts and one run of 8 bts VT.     MD Shepard made aware at 0200. Per MD notify if Magnesium is <2.     Magnesium resulted as 2.1

## 2023-11-28 NOTE — ANESTHESIA TIME REPORT
Anesthesia Start and Stop Event Times       Date Time Event    11/28/2023 1455 Ready for Procedure     1457 Anesthesia Start     1540 Anesthesia Stop          Responsible Staff  11/28/23      Name Role Begin End    Atif Vallecillo M.D. Anesth 1457 1540          Overtime Reason:  overtime    Comments:

## 2023-11-29 ENCOUNTER — PHARMACY VISIT (OUTPATIENT)
Dept: PHARMACY | Facility: MEDICAL CENTER | Age: 68
End: 2023-11-29
Payer: COMMERCIAL

## 2023-11-29 VITALS
SYSTOLIC BLOOD PRESSURE: 162 MMHG | TEMPERATURE: 98.4 F | HEART RATE: 79 BPM | OXYGEN SATURATION: 91 % | RESPIRATION RATE: 18 BRPM | HEIGHT: 64 IN | BODY MASS INDEX: 25.78 KG/M2 | DIASTOLIC BLOOD PRESSURE: 70 MMHG | WEIGHT: 151.01 LBS

## 2023-11-29 LAB
ALBUMIN SERPL BCP-MCNC: 3.2 G/DL (ref 3.2–4.9)
BASOPHILS # BLD AUTO: 0.3 % (ref 0–1.8)
BASOPHILS # BLD: 0.02 K/UL (ref 0–0.12)
BUN SERPL-MCNC: 5 MG/DL (ref 8–22)
CALCIUM ALBUM COR SERPL-MCNC: 9.1 MG/DL (ref 8.5–10.5)
CALCIUM SERPL-MCNC: 8.5 MG/DL (ref 8.4–10.2)
CHLORIDE SERPL-SCNC: 109 MMOL/L (ref 96–112)
CO2 SERPL-SCNC: 22 MMOL/L (ref 20–33)
CREAT SERPL-MCNC: 0.61 MG/DL (ref 0.5–1.4)
EOSINOPHIL # BLD AUTO: 0.14 K/UL (ref 0–0.51)
EOSINOPHIL NFR BLD: 2.1 % (ref 0–6.9)
ERYTHROCYTE [DISTWIDTH] IN BLOOD BY AUTOMATED COUNT: 48.8 FL (ref 35.9–50)
GFR SERPLBLD CREATININE-BSD FMLA CKD-EPI: 96 ML/MIN/1.73 M 2
GLUCOSE BLD STRIP.AUTO-MCNC: 198 MG/DL (ref 65–99)
GLUCOSE SERPL-MCNC: 173 MG/DL (ref 65–99)
HCT VFR BLD AUTO: 30.2 % (ref 37–47)
HGB BLD-MCNC: 9.2 G/DL (ref 12–16)
IMM GRANULOCYTES # BLD AUTO: 0.02 K/UL (ref 0–0.11)
IMM GRANULOCYTES NFR BLD AUTO: 0.3 % (ref 0–0.9)
LYMPHOCYTES # BLD AUTO: 1.2 K/UL (ref 1–4.8)
LYMPHOCYTES NFR BLD: 18 % (ref 22–41)
MAGNESIUM SERPL-MCNC: 2 MG/DL (ref 1.5–2.5)
MCH RBC QN AUTO: 27.3 PG (ref 27–33)
MCHC RBC AUTO-ENTMCNC: 30.5 G/DL (ref 32.2–35.5)
MCV RBC AUTO: 89.6 FL (ref 81.4–97.8)
MONOCYTES # BLD AUTO: 0.74 K/UL (ref 0–0.85)
MONOCYTES NFR BLD AUTO: 11.1 % (ref 0–13.4)
NEUTROPHILS # BLD AUTO: 4.54 K/UL (ref 1.82–7.42)
NEUTROPHILS NFR BLD: 68.2 % (ref 44–72)
NRBC # BLD AUTO: 0 K/UL
NRBC BLD-RTO: 0 /100 WBC (ref 0–0.2)
PHOSPHATE SERPL-MCNC: 2.6 MG/DL (ref 2.5–4.5)
PLATELET # BLD AUTO: 192 K/UL (ref 164–446)
PMV BLD AUTO: 9.1 FL (ref 9–12.9)
POTASSIUM SERPL-SCNC: 4.5 MMOL/L (ref 3.6–5.5)
RBC # BLD AUTO: 3.37 M/UL (ref 4.2–5.4)
SODIUM SERPL-SCNC: 139 MMOL/L (ref 135–145)
WBC # BLD AUTO: 6.7 K/UL (ref 4.8–10.8)

## 2023-11-29 PROCEDURE — A9270 NON-COVERED ITEM OR SERVICE: HCPCS | Performed by: HOSPITALIST

## 2023-11-29 PROCEDURE — 700102 HCHG RX REV CODE 250 W/ 637 OVERRIDE(OP): Performed by: HOSPITALIST

## 2023-11-29 PROCEDURE — 83735 ASSAY OF MAGNESIUM: CPT

## 2023-11-29 PROCEDURE — 82962 GLUCOSE BLOOD TEST: CPT

## 2023-11-29 PROCEDURE — 99239 HOSP IP/OBS DSCHRG MGMT >30: CPT | Performed by: INTERNAL MEDICINE

## 2023-11-29 PROCEDURE — 36415 COLL VENOUS BLD VENIPUNCTURE: CPT

## 2023-11-29 PROCEDURE — 94760 N-INVAS EAR/PLS OXIMETRY 1: CPT

## 2023-11-29 PROCEDURE — 94640 AIRWAY INHALATION TREATMENT: CPT

## 2023-11-29 PROCEDURE — A9270 NON-COVERED ITEM OR SERVICE: HCPCS | Performed by: INTERNAL MEDICINE

## 2023-11-29 PROCEDURE — 700102 HCHG RX REV CODE 250 W/ 637 OVERRIDE(OP): Performed by: INTERNAL MEDICINE

## 2023-11-29 PROCEDURE — 85025 COMPLETE CBC W/AUTO DIFF WBC: CPT

## 2023-11-29 PROCEDURE — 80069 RENAL FUNCTION PANEL: CPT

## 2023-11-29 PROCEDURE — RXMED WILLOW AMBULATORY MEDICATION CHARGE: Performed by: INTERNAL MEDICINE

## 2023-11-29 RX ORDER — FERROUS SULFATE 325(65) MG
325 TABLET ORAL
Qty: 30 TABLET | Refills: 1 | Status: SHIPPED | OUTPATIENT
Start: 2023-11-30

## 2023-11-29 RX ADMIN — LOSARTAN POTASSIUM 50 MG: 50 TABLET, FILM COATED ORAL at 04:59

## 2023-11-29 RX ADMIN — CARVEDILOL 25 MG: 25 TABLET, FILM COATED ORAL at 07:42

## 2023-11-29 RX ADMIN — FERROUS SULFATE TAB 325 MG (65 MG ELEMENTAL FE) 325 MG: 325 (65 FE) TAB at 07:42

## 2023-11-29 RX ADMIN — MOMETASONE FUROATE AND FORMOTEROL FUMARATE DIHYDRATE 2 PUFF: 200; 5 AEROSOL RESPIRATORY (INHALATION) at 08:06

## 2023-11-29 RX ADMIN — OMEPRAZOLE 20 MG: 20 CAPSULE, DELAYED RELEASE ORAL at 04:59

## 2023-11-29 RX ADMIN — INSULIN HUMAN 2 UNITS: 100 INJECTION, SOLUTION PARENTERAL at 06:15

## 2023-11-29 ASSESSMENT — PAIN DESCRIPTION - PAIN TYPE: TYPE: ACUTE PAIN

## 2023-11-29 NOTE — RESPIRATORY CARE
"   COPD EDUCATION by COPD CLINICAL EDUCATOR  11/29/2023 at 12:03 PM by Lisa Potter, RRT     Patient unfortunately was unable to be reviewed by COPD education team. Patient does not have a history or diagnosis of COPD, according to PFT, and is a smoker.  Patient was discharged before education on quit smoking and COPD could be given.    COPD Screen  COPD Risk Screening  Do you have a history of COPD?: Yes  Do you have a Pulmonologist?: Yes    COPD Assessment  COPD Clinical Specialists ONLY  COPD Education Initiated: No--Quick Screen (Pt was DC before being seen, pt is a smoker 20+yrs, PFT 1/9/23 FEV1/FVC 71% however due to smoking everyone diagnosis is COPD, pt here for GI bleed was unable to provide education)  Interdisciplinary Rounds: Attendance at Rounds (30 Min)    PFT Results    1/9/23 FEV1/FVC 71%    Meds to Beds  [facility] provides bedside medication delivery for all eligible patients at discharge.  Would you like to opt out of this program for any reason?: No - Stay Opted In     MY COPD ACTION PLAN     It is recommended that patients and physicians /healthcare providers complete this action plan together. This plan should be discussed at each physician visit and updated as needed.    The green, yellow and red zones show groups of symptoms of COPD. This list of symptoms is not comprehensive, and you may experience other symptoms. In the \"Actions\" column, your healthcare provider has recommended actions for you to take based on your symptoms.    Patient Name: Mckenna Sewell   YOB: 1955   Last Updated on:     Green Zone:  I am doing well today Actions   •  Usual activitiy and exercise level •  Take daily medications   •  Usual amounts of cough and phlegm/mucus •  Use oxygen as prescribed   •  Sleep well at night •  Continue regular exercise/diet plan   •  Appetite is good •  At all times avoid cigarette smoke, inhaled irritants     Daily Medications (these medications are taken every day): " "               Yellow Zone:  I am having a bad day or a COPD flare Actions   •  More breathless than usual •  Continue daily medications   •  I have less energy for my daily activities •  Use quick relief inhaler as ordered   •  Increased or thicker phlegm/mucus •  Use oxygen as prescribed   •  Using quick relief inhaler/nebulizer more often •  Get plenty of rest   •  Swelling of ankles more than usual •  Use pursed lip breathing   •  More coughing than usual •  At all times avoid cigarette smoke, inhaled irritants   •  I feel like I have a \"chest cold\"   •  Poor sleep and my symptoms woke me up   •  My appetite is not good   •  My medicine is not helping    •  Call provider immediately if symptoms don’t improve     Continue daily medications, add rescue medications:               Medications to be used during a flare up, (as Discussed with Provider):              Red Zone:  I need urgent medical care Actions   •  Severe shortness of breath even at rest •  Call 911 or seek medical care immediately   •  Not able to do any activity because of breathing    •  Fever or shaking chills    •  Feeling confused or very drowsy     •  Chest pains    •  Coughing up blood                  "

## 2023-11-29 NOTE — PROGRESS NOTES
OVERNIGHT HOSPITALIST:    Paged by nursing Staff. Patient had 13 beats of Vtach at 22:10. Asymptomatic, Vitals signs WNL.    Reviewed the chart. Adding STAT BMP, CBC, Magnesium and Phos. Closely monitor.

## 2023-11-29 NOTE — PROGRESS NOTES
Discharge instructions given and discussed, signed copy in chart. Pt verbalized understanding and all questions answered. New prescriptions delivered by meds to beds. Pt discharged home in stable condition on 2L O2 via home O2 cannister escorted by PRN. Personal belongings in possession of family member patient. IV removed and tolerated well. Tele box removed, monitor tech notified.

## 2023-11-29 NOTE — PROCEDURES
DATE OF PROCEDURE:  11/28/2023     PROCEDURE:  Push enteroscopy with APC therapy.     :  Mauricio Lawrence MD     INDICATIONS:  The patient is a 68-year-old on Eliquis for atrial fibrillation   and known prior intestinal AVMs that presented for evaluation of iron   deficiency anemia and Hemoccult positive stools.  No overt GI bleeding.     INFORMED CONSENT:  Written informed consent was obtained from the patient   after thorough explanation of indications, benefits, and risks of the   procedure, which included but was not limited to bleeding, infection,   perforation, adverse reaction to medications, and missed lesions.     MEDICATIONS GIVEN:  Monitored anesthesia care with propofol.     Continuous telemetry, BP, pulse oximetry, and capnography were performed by   the anesthesiologist throughout the procedure.     A pediatric colonoscope was used for the procedure.     FINDINGS:  The patient was placed in the left lateral decubitus position.    After anesthesia was achieved, the endoscope was passed in the posterior   pharynx under direct visualization, advanced to the level of the proximal to   mid jejunum.  The scope did pass approximately 80 cm beyond the pylorus.  The   patient tolerated the procedure well with following findings:  1.  Esophagus:  Normal.  2.  Stomach:  She had two small nonbleeding AVMs in the gastric body.  These   were ablated with APC therapy.  3.  Duodenum:  She had approximately 4 AVMs scattered throughout the duodenum.    These were ablated with APC therapy without difficulty.  No bleeding.  4.  Jejunum.  There were approximately 4 more jejunal AVMs.  These again were   ablated with APC therapy.  Two of these AVMs did ooze slightly with initial   bursts of APC therapy indicative of possible recent bleeding.  They were all   successfully ablated completely.     IMPRESSION:  Multiple gastric, duodenal, and jejunal arteriovenous   malformations, ablated with APC therapy.      PLAN:  1.  Continue iron supplementation.  2.  We will need serial outpatient hemoglobin monitoring.  3.  PPI daily.  4.  Likely resume Eliquis tomorrow.  5.  Proceed with colonoscopy.        ______________________________  MD LENCHO RIVAS/COSTA    DD:  11/28/2023 15:41  DT:  11/28/2023 16:08    Job#:  [id number]

## 2023-11-29 NOTE — CARE PLAN
The patient is Stable - Low risk of patient condition declining or worsening    Shift Goals  Clinical Goals: EGD/colonoscopy  Patient Goals: comfort, eat  Family Goals: no    Progress made toward(s) clinical / shift goals:  yes      Patient is not progressing towards the following goals:

## 2023-11-29 NOTE — PROGRESS NOTES
Telemetry summary    Rhythm: AV POD with BBB  Rate: 69-77  Ectopy: r-o PVC, r-o-f PVC, rare trigem, rare triplet, rare couplet  Measurements: 0.16/0.14/0.40    Normal Values  Rhythm: SR  HR Range:   Measurement: 0.12-0.2/0.06-0.10/0.30-0.52

## 2023-11-29 NOTE — CARE PLAN
The patient is Stable - Low risk of patient condition declining or worsening    Shift Goals  Clinical Goals: labs, monitor heart rhythm  Patient Goals: comfort and rest  Family Goals: CHERYL    Progress made toward(s) clinical / shift goals:    Problem: Knowledge Deficit - Standard  Goal: Patient and family/care givers will demonstrate understanding of plan of care, disease process/condition, diagnostic tests and medications  Outcome: Progressing    Problem: Fall Risk  Goal: Patient will remain free from falls  Outcome: Progressing     Problem: Pain - Standard  Goal: Alleviation of pain or a reduction in pain to the patient’s comfort goal  Outcome: Progressing       Problem: Gastrointestinal Irritability  Goal: Nausea and vomiting will be absent or improve  Outcome: Progressing

## 2023-11-29 NOTE — DISCHARGE SUMMARY
"Discharge Summary    CHIEF COMPLAINT ON ADMISSION  Chief Complaint   Patient presents with    Weakness     Pt c/o ongoing weakness    Chest Wall Pain     Pt c/o pain w/ coughing and deep breathing  States recently dx w/ rib fractures r/t coughing    Shortness of Breath     Pt stated \"can't hardly breath\"  Pt does use 2L home oxygen       Reason for Admission  EMS     Admission Date  11/26/2023    CODE STATUS  Full    HPI & HOSPITAL COURSE  This is a 68 y.o. female with a History of COPD, hypertension, hyperlipidemia, type 2 diabetes, A-fib on anticoagulation with Eliquis and history of GI AVMs who presented with weakness and shortness of breath after a recent diagnosis of COVID.  She has been feeling progressively weak since her diagnosis and was found to have hemoglobin of 7.6 on arrival.  She was transfused 1 unit RBC after she tested positive for occult blood in the ER.  GI was consulted and she was given a bowel prep.  Hemoglobin improved after 1 unit RBC transfusion to 9.3 and remained stable.  She had no evidence of observed blood loss.  GI recommended EGD and colonoscopy which was performed on 11/28/2023 without complication.  A partially previously treated AVM was seen in the cecum however no further ablation was performed due to the fair to poor prep during this exam.  She had moderate sigmoid diverticulosis.  It was recommended to continue iron supplementation and monitor H&H.  This remained stable on the day of discharge therefore she was determined stable for discharge.  She will undergo routine follow-up with outpatient GI and consider an outpatient capsule endoscopy if she continues to have progressive anemia.  She will resume Eliquis on 12/3/2023.    She will follow-up with her PCP within 1 week    Therefore, she is discharged in fair and stable condition to home with close outpatient follow-up.    The patient met 2-midnight criteria for an inpatient stay at the time of discharge.    Discharge " Date  11/29/2023    FOLLOW UP ITEMS POST DISCHARGE  Follow-up with GI as needed  Consider outpatient capsule endoscopy if anemia persists  Resume Eliquis 12/3/2023    DISCHARGE DIAGNOSES  Principal Problem:    GI bleeding (POA: Yes)  Active Problems:    COPD (chronic obstructive pulmonary disease) (HCC) (Chronic) (POA: Yes)    Hyperlipidemia (POA: Yes)    Tobacco dependence (POA: Yes)    Type 2 diabetes mellitus with complication (HCC) (Chronic) (POA: Yes)    AF (atrial fibrillation) (HCC) (POA: Yes)    Chronic systolic congestive heart failure (HCC) (POA: Yes)    Hypokalemia (POA: Yes)    ACP (advance care planning) (POA: Yes)    AVM (arteriovenous malformation) (POA: Unknown)  Resolved Problems:    * No resolved hospital problems. *      FOLLOW UP  Mauricio Lawrence M.D.  [id number]  [place] [address]  [phone]    Follow up      [facility]  [address]  [place] [id number]          MEDICATIONS ON DISCHARGE     Medication List        START taking these medications        Instructions   FeroSul 325 (65 Fe) MG tablet  Start taking on: November 30, 2023  Generic drug: ferrous sulfate   Take 1 Tablet by mouth every morning with breakfast.  Dose: 325 mg            CHANGE how you take these medications        Instructions   apixaban 5mg Tabs  Start taking on: December 4, 2023  What changed: These instructions start on December 4, 2023. If you are unsure what to do until then, ask your doctor or other care provider.  Commonly known as: Eliquis   Take 1 Tablet by mouth 2 times a day.  Dose: 5 mg     carvedilol 25 MG Tabs  What changed: when to take this  Commonly known as: Coreg   TAKE ONE TABLET BY MOUTH TWICE DAILY WITH MEALS            CONTINUE taking these medications        Instructions   acetaminophen 500 MG Tabs  Commonly known as: Tylenol   Take 500-1,000 mg by mouth every 6 hours as needed. Indications: Pain  Dose: 500-1,000 mg     atorvastatin 10 MG Tabs  Commonly known as:  "Lipitor   Take 10 mg by mouth every evening.  Dose: 10 mg     Breo Ellipta 200-25 MCG/ACT Aepb  Generic drug: fluticasone furoate-vilanterol   Inhale 1 Puff every day.  Dose: 1 Puff     cyclobenzaprine 10 mg Tabs  Commonly known as: Flexeril   Take 1 Tablet by mouth 3 times a day as needed for Muscle Spasms or Moderate Pain.  Dose: 10 mg     furosemide 20 MG Tabs  Commonly known as: Lasix   Take 1 Tablet by mouth 2 times a day.  Dose: 20 mg     gabapentin 300 MG Caps  Commonly known as: Neurontin   Take 2 Capsules by mouth at bedtime.  Dose: 600 mg     glipiZIDE 5 MG Tabs  Commonly known as: Glucotrol   Take 5 mg by mouth every day.  Dose: 5 mg     Home Care Oxygen   Inhale 3 L/min continuous. DME - [facility]  Dose: 3 L/min     lidocaine 5 % Ptch  Commonly known as: Lidoderm   Place 1 Patch on the skin every 24 hours.  Dose: 1 Patch     losartan 50 MG Tabs  Commonly known as: Cozaar   Take 1 Tab by mouth 2 Times a Day.  Dose: 50 mg     NovoLOG FlexPen 100 UNIT/ML injection PEN  Generic drug: insulin aspart   Inject 2-18 Units under the skin 2 times a day. PER SLIDING SCALE:  2 units for level 150+, then additional 2 units for every 50 above.  Dose: 2-18 Units     omeprazole 20 MG delayed-release capsule  Commonly known as: PriLOSEC   Take 1 Capsule by mouth every day.  Dose: 20 mg     potassium chloride SA 20 MEQ Tbcr  Commonly known as: Kdur   Take 1 Tablet by mouth 2 times a day.  Dose: 20 mEq     Tradjenta 5 MG Tabs tablet  Generic drug: linagliptin   Take 5 mg by mouth every evening.  Dose: 5 mg              Allergies  Allergies   Allergen Reactions    Codeine Rash     All over body      Fluticasone-Salmeterol Itching    Lisinopril Rash and Itching     On body      Sulfa Drugs Rash and Itching     On body      Jardiance [Empagliflozin]      \"Dizziness\" per pt       DIET  As tolerated    ACTIVITY  As tolerated.  Weight bearing as tolerated    CONSULTATIONS  GI, Howard    PROCEDURES  EGD/colonoscopy  11/28  1.  " Partially previously treated AVM within the cecum.  No further ablation   performed due to the fair to poor prep during this exam.  2.  Moderate sigmoid diverticulosis.  3.  Fair to poor prep in the right colon.    LABORATORY  Lab Results   Component Value Date    SODIUM 139 11/29/2023    POTASSIUM 4.5 11/29/2023    CHLORIDE 109 11/29/2023    CO2 22 11/29/2023    GLUCOSE 173 (H) 11/29/2023    BUN 5 (L) 11/29/2023    CREATININE 0.61 11/29/2023    CREATININE 0.9 10/30/2008        Lab Results   Component Value Date    WBC 6.7 11/29/2023    HEMOGLOBIN 9.2 (L) 11/29/2023    HEMATOCRIT 30.2 (L) 11/29/2023    PLATELETCT 192 11/29/2023        Total time of the discharge process exceeds 39 minutes.

## 2023-11-29 NOTE — PROCEDURES
DATE OF PROCEDURE:  11/28/2023     PROCEDURE:  Colonoscopy.     :  Mauricio Lawrence MD     INDICATIONS:  The patient is a 68-year-old female with a known prior cecal   AVMs ablated with APC therapy that presented for iron deficiency anemia.  She   is on Eliquis for atrial fibrillation.  No overt GI bleeding.     INFORMED CONSENT:  Written informed consent was obtained from the patient   after thorough explanation of indications, benefits, and risks of the   procedure, which included, but was not limited to bleeding, infection,   perforation, adverse reaction to medications and missed lesions.     MEDICATIONS GIVEN:  Monitored anesthesia care with propofol.     Continuous telemetry, BP, pulse oximetry, and capnography were performed by   the anesthesiologist throughout the procedure.     Pediatric colonoscope was used for the procedure.     FINDINGS:  The patient was placed in left lateral decubitus position.  After   anesthesia was achieved, a digital rectal exam was performed and found to be   normal.  The endoscope was then inserted into the rectum and advanced to the   cecum, which was identified by the appendiceal orifice and the ileocecal   valve.  It did require sigmoid abdominal pressure to reach the cecum.  The   terminal ileum was not intubated.  The colonoscope was then withdrawn with   careful inspection of mucosa.  There was evidence for a small branching   angioectasia within the cecum with a scar in the middle consistent with prior   APC therapy.  This was not ablated during this exam due to the fair to poor   prep in the right colon.  No other obvious AVMs evident in the cecum or right   colon. Again, the prep within the right colon was fair to poor.  She did have   moderate sigmoid diverticulosis.  No other large lesions seen.  Retroflexion   in the rectum was performed and found to be more normal.  The patient   underwent GoLYTELY prep, the results of which were fair to poor in the  right   colon, but fair to good and adequate in the remainder of the colon.     IMPRESSION:  1.  Partially previously treated AVM within the cecum.  No further ablation   performed due to the fair to poor prep during this exam.  2.  Moderate sigmoid diverticulosis.  3.  Fair to poor prep in the right colon.     PLAN:  1.  Continue iron supplementation.  2.  Serial H and H monitoring likely on a monthly basis as an outpatient.  3.  Okay to resume Eliquis tomorrow.  4.  Consider capsule endoscopy as an outpatient if she continues to have   progressive anemia.        ______________________________  MD LENCHO RIVAS/ADILSON    DD:  11/28/2023 15:45  DT:  11/28/2023 16:43    Job#:  [id number]

## 2023-11-29 NOTE — PROGRESS NOTES
Notified by monitor tech of 13 beats of Vtach. MD made aware. New orders placed.     [id number] Notified MD of lab results.

## 2023-11-29 NOTE — PROGRESS NOTES
4 Eyes Skin Assessment Completed by Vanda RN and AUNDREA Clifton.    Head WDL  Ears WDL  Nose WDL  Mouth WDL  Neck WDL  Breast/Chest Scab  Shoulder Blades WDL  Spine WDL  (R) Arm/Elbow/Hand WDL  (L) Arm/Elbow/Hand WDL  Abdomen WDL  Groin WDL  Scrotum/Coccyx/Buttocks WDL  (R) Leg WDL  (L) Leg WDL  (R) Heel/Foot/Toe Scab  (L) Heel/Foot/Toe Scab          Devices In Places Tele Box and Nasal Cannula      Interventions In Place Gray Ear Foams and Pillows    Possible Skin Injury No    Pictures Uploaded Into Epic N/A  Wound Consult Placed N/A  RN Wound Prevention Protocol Ordered No

## 2023-12-04 ENCOUNTER — TELEPHONE (OUTPATIENT)
Dept: HEALTH INFORMATION MANAGEMENT | Facility: OTHER | Age: 68
End: 2023-12-04
Payer: MEDICARE

## 2023-12-15 ENCOUNTER — OFFICE VISIT (OUTPATIENT)
Dept: URGENT CARE | Facility: CLINIC | Age: 68
End: 2023-12-15
Payer: MEDICARE

## 2023-12-15 ENCOUNTER — APPOINTMENT (OUTPATIENT)
Dept: RADIOLOGY | Facility: IMAGING CENTER | Age: 68
End: 2023-12-15
Attending: PHYSICIAN ASSISTANT
Payer: MEDICARE

## 2023-12-15 VITALS
DIASTOLIC BLOOD PRESSURE: 68 MMHG | SYSTOLIC BLOOD PRESSURE: 120 MMHG | OXYGEN SATURATION: 92 % | RESPIRATION RATE: 16 BRPM | TEMPERATURE: 97.7 F | HEIGHT: 64 IN | HEART RATE: 88 BPM | BODY MASS INDEX: 24.24 KG/M2 | WEIGHT: 142 LBS

## 2023-12-15 DIAGNOSIS — M54.50 LUMBAR BACK PAIN: ICD-10-CM

## 2023-12-15 DIAGNOSIS — M51.36 DDD (DEGENERATIVE DISC DISEASE), LUMBAR: ICD-10-CM

## 2023-12-15 PROCEDURE — 99204 OFFICE O/P NEW MOD 45 MIN: CPT | Performed by: PHYSICIAN ASSISTANT

## 2023-12-15 PROCEDURE — 72100 X-RAY EXAM L-S SPINE 2/3 VWS: CPT | Mod: TC | Performed by: PHYSICIAN ASSISTANT

## 2023-12-15 PROCEDURE — 3074F SYST BP LT 130 MM HG: CPT | Performed by: PHYSICIAN ASSISTANT

## 2023-12-15 PROCEDURE — 3078F DIAST BP <80 MM HG: CPT | Performed by: PHYSICIAN ASSISTANT

## 2023-12-15 RX ORDER — METHOCARBAMOL 500 MG/1
500 TABLET, FILM COATED ORAL 3 TIMES DAILY PRN
Qty: 30 TABLET | Refills: 0 | Status: SHIPPED | OUTPATIENT
Start: 2023-12-15

## 2023-12-15 RX ORDER — METHYLPREDNISOLONE 4 MG/1
TABLET ORAL
Qty: 21 TABLET | Refills: 0 | Status: SHIPPED | OUTPATIENT
Start: 2023-12-15 | End: 2023-12-21

## 2023-12-15 ASSESSMENT — FIBROSIS 4 INDEX: FIB4 SCORE: 1.42

## 2023-12-15 ASSESSMENT — ENCOUNTER SYMPTOMS
CHILLS: 0
FLANK PAIN: 0
FEVER: 0
VOMITING: 0
BACK PAIN: 1
NAUSEA: 0
MYALGIAS: 1

## 2023-12-15 NOTE — PROGRESS NOTES
Subjective:   Mckenna Sewell is a 68 y.o. female who presents for Back Pain        Patient presents with concerns of low back pain that began about a week ago.  She states that she was attempting to flip her 12 and mattress and she felt a sudden pop in her lumbar spine as well as the sudden onset of pain.  Pain occasionally radiates down the outside of the left hip and leg.  She is not having any focal pain in the hips bilaterally.  Pain is exacerbated by any movement and somewhat alleviated by rest.  She has not tried taking any medications for symptoms.  Patient is on Eliquis and is unable to take NSAIDs.  Denies prior injury to her lumbar spine and denies history of osteoporosis.  She denies lower extremity weakness, loss of bowel or bladder control and saddle dysesthesias.      Review of Systems   Constitutional:  Negative for chills and fever.   Gastrointestinal:  Negative for nausea and vomiting.   Genitourinary:  Negative for dysuria, flank pain, frequency, hematuria and urgency.   Musculoskeletal:  Positive for back pain and myalgias.       PMH:  has a past medical history of MAXIMILIANO (acute kidney injury) (Formerly [facility]) (5/15/2022), Benign essential hypertension, CAD (coronary artery disease), CHF (congestive heart failure) (Formerly [facility]), Congestive heart failure (Formerly [facility]), COPD, COPD (chronic obstructive pulmonary disease) (Formerly [facility]), COVID-19 (11/9/2023), Dilated cardiomyopathy (Formerly [facility]), History of cardiac catheterization, Hypertension, Mixed hyperlipidemia, Nicotine dependence, Nicotine dependence, and Type 2 diabetes mellitus with complication (Formerly [facility]).    She has no past medical history of ASTHMA, Cancer (Formerly [facility]), Diabetes, Liver disease, Seizure disorder ([facility]ly [facility]), or Stroke (Formerly [facility]).  MEDS:   Current Outpatient Medications:     methocarbamol (ROBAXIN) 500 MG Tab, Take 1 Tablet by mouth 3 times a day as needed (pain and spasm)., Disp: 30 Tablet, Rfl: 0    methylPREDNISolone (MEDROL DOSEPAK) 4 MG Tablet Therapy Pack, Follow schedule on package  instructions., Disp: 21 Tablet, Rfl: 0    apixaban (ELIQUIS) 5mg Tab, Take 1 Tablet by mouth 2 times a day., Disp: 60 Tablet, Rfl: 1    ferrous sulfate 325 (65 Fe) MG tablet, Take 1 Tablet by mouth every morning with breakfast., Disp: 30 Tablet, Rfl: 1    glipiZIDE (GLUCOTROL) 5 MG Tab, Take 5 mg by mouth every day., Disp: , Rfl:     omeprazole (PRILOSEC) 20 MG delayed-release capsule, Take 1 Capsule by mouth every day., Disp: 30 Capsule, Rfl: 0    gabapentin (NEURONTIN) 300 MG Cap, Take 2 Capsules by mouth at bedtime., Disp: 90 Capsule, Rfl: 0    potassium chloride SA (KDUR) 20 MEQ Tab CR, Take 1 Tablet by mouth 2 times a day., Disp: 120 Tablet, Rfl: 1    Home Care Oxygen, Inhale 3 L/min continuous. [facility], Disp: , Rfl:     atorvastatin (LIPITOR) 10 MG Tab, Take 10 mg by mouth every evening., Disp: , Rfl:     insulin aspart (NOVOLOG FLEXPEN) 100 UNIT/ML injection PEN, Inject 2-18 Units under the skin 2 times a day. PER SLIDING SCALE: 2 units for level 150+, then additional 2 units for every 50 above., Disp: , Rfl:     losartan (COZAAR) 50 MG Tab, Take 1 Tab by mouth 2 Times a Day., Disp: 180 Tab, Rfl: 3    linagliptin (TRADJENTA) 5 MG Tab tablet, Take 5 mg by mouth every evening., Disp: , Rfl:     acetaminophen (TYLENOL) 500 MG Tab, Take 500-1,000 mg by mouth every 6 hours as needed. Indications: Pain (Patient not taking: Reported on 12/15/2023), Disp: , Rfl:     lidocaine (LIDODERM) 5 % Patch, Place 1 Patch on the skin every 24 hours., Disp: 10 Patch, Rfl: 0    fluticasone furoate-vilanterol (BREO ELLIPTA) 200-25 MCG/ACT AEROSOL POWDER, BREATH ACTIVATED, Inhale 1 Puff every day., Disp: , Rfl:     furosemide (LASIX) 20 MG Tab, Take 1 Tablet by mouth 2 times a day., Disp: 60 Tablet, Rfl: 1    carvedilol (COREG) 25 MG Tab, TAKE ONE TABLET BY MOUTH TWICE DAILY WITH MEALS (Patient taking differently: Take 25 mg by mouth 2 times a day.), Disp: 180 Tab, Rfl: 0  ALLERGIES:   Allergies   Allergen Reactions     "Codeine Rash     All over body      Fluticasone-Salmeterol Itching    Lisinopril Rash and Itching     On body      Sulfa Drugs Rash and Itching     On body      Jardiance [Empagliflozin]      \"Dizziness\" per pt     SURGHX:   Past Surgical History:   Procedure Laterality Date    [place] UPPER GI ENDOSCOPY,DIAGNOSIS N/A 11/28/2023    Procedure: GASTROSCOPY;  Surgeon: Mauricio Lawrence M.D.;  Location: SURGERY [facility];  Service: Gastroenterology    IA COLONOSCOPY,DIAGNOSTIC N/A 11/28/2023    Procedure: COLONOSCOPY;  Surgeon: Mauricio Lawrence M.D.;  Location: SURGERY [facility];  Service: Gastroenterology    PB OPEN FIX INTER/SUBTROCH FX,IMPLNT Right 7/6/2022    Procedure: INSERTION, INTRAMEDULLARY FAVIAN, FEMUR, PROXIMAL;  Surgeon: Carl Huynh M.D.;  Location: SURGERY [facility];  Service: Orthopedics    APPENDECTOMY      OTHER CARDIAC SURGERY       SOCHX:  reports that she has been smoking cigarettes. She has a 20.0 pack-year smoking history. She has never used smokeless tobacco. She reports that she does not drink alcohol and does not use drugs.  FH: Family history was reviewed, no pertinent findings to report   Objective:   /68 (BP Location: Left arm, Patient Position: Sitting, BP Cuff Size: Adult)   Pulse 88   Temp 36.5 °C (97.7 °F)   Resp 16   Ht 1.626 m (5' 4\")   Wt 64.4 kg (142 lb)   SpO2 92%   BMI 24.37 kg/m²   Physical Exam  Vitals reviewed.   Constitutional:       General: She is not in acute distress.     Appearance: Normal appearance. She is well-developed. She is not toxic-appearing.   HENT:      Head: Normocephalic and atraumatic.      Right Ear: External ear normal.      Left Ear: External ear normal.      Nose: Nose normal.   Cardiovascular:      Rate and Rhythm: Normal rate and regular rhythm.   Pulmonary:      Effort: Pulmonary effort is normal. No respiratory distress.      Breath sounds: No stridor.   Musculoskeletal:      Comments: Skin of the back is intact and " atraumatic.  Cervical and thoracic spine nontender to palpation.  Patient is tender to palpation over lumbar spinous processes in vicinity of L4 and L5.  No palpable step-offs or deformities.  Paralumbar muscles mildly tender to palpation bilaterally.  SI joints, pelvis, right greater trochanter nontender to palpation bilaterally.  Pelvis is stable to compression bilaterally.  Resisted hip flexion and extension 5 out of 5 right and 4-5 left.  Resisted knee and ankle flexion and extension 5 out of 5 bilaterally.  Lower extremity sensation intact and even bilaterally.   Skin:     General: Skin is dry.   Neurological:      Comments: Alert and oriented.    Psychiatric:         Speech: Speech normal.         Behavior: Behavior normal.         Imaging:    FINDINGS:  The bony trabecular pattern is normal.  The lumbar vertebral bodies have a normal height and alignment. There are scattered age-related degenerative changes of the spine with intervertebral disc space narrowing, endplate spurring and facet arthropathy.    There are atherosclerotic calcifications of the aorta and its branch vessels.     IMPRESSION:     Degenerative changes of lumbar spine without obvious fracture or malalignment.  Assessment/Plan:   1. Lumbar back pain  - DX-LUMBAR SPINE-2 OR 3 VIEWS; Future  - methocarbamol (ROBAXIN) 500 MG Tab; Take 1 Tablet by mouth 3 times a day as needed (pain and spasm).  Dispense: 30 Tablet; Refill: 0  - methylPREDNISolone (MEDROL DOSEPAK) 4 MG Tablet Therapy Pack; Follow schedule on package instructions.  Dispense: 21 Tablet; Refill: 0    2. DDD (degenerative disc disease), lumbar  - methocarbamol (ROBAXIN) 500 MG Tab; Take 1 Tablet by mouth 3 times a day as needed (pain and spasm).  Dispense: 30 Tablet; Refill: 0  - methylPREDNISolone (MEDROL DOSEPAK) 4 MG Tablet Therapy Pack; Follow schedule on package instructions.  Dispense: 21 Tablet; Refill: 0    Fortunately there is no evidence of fracture on imaging.  Patient  does have evidence of degenerative disc disease and other degenerative changes.  Patient started on a Medrol Dosepak and Robaxin as needed.  Recommend frequent application of heat as well.  I would like her to follow-up with her PCP on Monday or Tuesday for reevaluation and further management.  If symptoms persist physical therapy may be beneficial.

## 2023-12-21 ENCOUNTER — OFFICE VISIT (OUTPATIENT)
Dept: URGENT CARE | Facility: CLINIC | Age: 68
End: 2023-12-21
Payer: MEDICARE

## 2023-12-21 VITALS
SYSTOLIC BLOOD PRESSURE: 132 MMHG | BODY MASS INDEX: 24.07 KG/M2 | TEMPERATURE: 97.6 F | RESPIRATION RATE: 16 BRPM | HEIGHT: 64 IN | WEIGHT: 141 LBS | OXYGEN SATURATION: 97 % | HEART RATE: 87 BPM | DIASTOLIC BLOOD PRESSURE: 82 MMHG

## 2023-12-21 DIAGNOSIS — M54.50 LUMBAR BACK PAIN: ICD-10-CM

## 2023-12-21 PROCEDURE — 99213 OFFICE O/P EST LOW 20 MIN: CPT | Performed by: STUDENT IN AN ORGANIZED HEALTH CARE EDUCATION/TRAINING PROGRAM

## 2023-12-21 PROCEDURE — 3075F SYST BP GE 130 - 139MM HG: CPT | Performed by: STUDENT IN AN ORGANIZED HEALTH CARE EDUCATION/TRAINING PROGRAM

## 2023-12-21 PROCEDURE — 3079F DIAST BP 80-89 MM HG: CPT | Performed by: STUDENT IN AN ORGANIZED HEALTH CARE EDUCATION/TRAINING PROGRAM

## 2023-12-21 RX ORDER — PREDNISONE 10 MG/1
10 TABLET ORAL DAILY
Qty: 5 TABLET | Refills: 0 | Status: SHIPPED | OUTPATIENT
Start: 2023-12-21 | End: 2023-12-26

## 2023-12-21 ASSESSMENT — FIBROSIS 4 INDEX: FIB4 SCORE: 1.42

## 2023-12-21 NOTE — PROGRESS NOTES
Subjective:   Mckenna Sewell is a 68 y.o. female who presents for Follow-Up (Follow-up for back pain that has now traveled to the other side of the back )      HPI:  68-year-old female presents to the urgent care for follow-up of lumbar back pain that started approximately 2 weeks ago.  Initially occurred after lifting her mattress.  Originally was located mainly to the left side but has now traveled to the right side as well.  She does have some pain going down into the right gluteus.  Try the course of a Medrol Dosepak and methocarbamol.  Has not had significant improvement with these medications.  Denies any new injury or trauma since last being seen.  Has follow-up with her PCP on 1/5/2023.  No saddle anesthesia, loss of bladder control, loss of bowel control, chest pain, shortness of breath, midline back pain, inability bear weight.  Pain is worse with bending her legs to put her socks on, going from sitting to standing, and bending forward.  No numbness, tingling, or burning.    Medications:    acetaminophen Tabs  apixaban Tabs  atorvastatin Tabs  carvedilol Tabs  ferrous sulfate  fluticasone furoate-vilanterol Aepb  furosemide Tabs  gabapentin Caps  glipiZIDE Tabs  Home Care Oxygen  lidocaine Ptch  losartan Tabs  methocarbamol Tabs  NovoLOG FlexPen Sopn  omeprazole  potassium chloride SA Tbcr  predniSONE Tabs  Tradjenta Tabs    Allergies: Codeine, Fluticasone-salmeterol, Lisinopril, Sulfa drugs, and Jardiance [empagliflozin]    Problem List: Mckenna Sewell does not have any pertinent problems on file.    Surgical History:  Past Surgical History:   Procedure Laterality Date    [place] UPPER GI ENDOSCOPY,DIAGNOSIS N/A 11/28/2023    Procedure: GASTROSCOPY;  Surgeon: Mauricio Lawrence M.D.;  Location: [facility];  Service: Gastroenterology    OH COLONOSCOPY,DIAGNOSTIC N/A 11/28/2023    Procedure: COLONOSCOPY;  Surgeon: Mauricio Lawrence M.D.;  Location: [facility];  Service:  "Gastroenterology    PB OPEN FIX INTER/SUBTROCH FX,IMPLNT Right 7/6/2022    Procedure: INSERTION, INTRAMEDULLARY FAVIAN, FEMUR, PROXIMAL;  Surgeon: Carl Huynh M.D.;  Location: SURGERY [facility];  Service: Orthopedics    APPENDECTOMY      OTHER CARDIAC SURGERY         Past Social Hx: Mckenna Sewell  reports that she has been smoking cigarettes. She has a 20.0 pack-year smoking history. She has never used smokeless tobacco. She reports that she does not drink alcohol and does not use drugs.     Past Family Hx:  Mckenna Sewell family history includes Heart Attack in her maternal aunt; Heart Disease in her father, maternal aunt, maternal uncle, and mother.     Problem list, medications, and allergies reviewed by myself today in Epic.     Objective:     /82   Pulse 87   Temp 36.4 °C (97.6 °F) (Temporal)   Resp 16   Ht 1.626 m (5' 4\")   Wt 64 kg (141 lb)   SpO2 97%   BMI 24.20 kg/m²     Physical Exam  Vitals reviewed.   Constitutional:       Appearance: Normal appearance.   Cardiovascular:      Rate and Rhythm: Normal rate.      Pulses: Normal pulses.   Pulmonary:      Effort: Pulmonary effort is normal.   Abdominal:      Tenderness: There is no right CVA tenderness or left CVA tenderness.   Musculoskeletal:      Comments: Lumbar back: No midline spinal tenderness, crepitus, or step-offs.  Mild tenderness to the right paraspinal musculature.  Positive left straight leg raise.  Negative right straight leg raise.  Sensation intact.  5 out of 5 strength with knee flexion, knee extension, and hip flexion.  No vesicular rash.  No ecchymosis, erythema, or swelling.   Neurological:      General: No focal deficit present.      Mental Status: She is alert and oriented to person, place, and time.         Assessment/Plan:     Diagnosis and associated orders:     1. Lumbar back pain  predniSONE (DELTASONE) 10 MG Tab    Referral to Physical Therapy         Comments/MDM:     Patient's presentation physical exam " findings are consistent with lumbar back pain most likely due to muscle strain.  She does have some associated radicular symptoms to the right gluteus.  X-ray imaging at last visit showed no acute fracture or dislocation.  Signs of degenerative disc disease.  No signs of cauda equina.  No red flag signs.  Additional short course of prednisone sent to the pharmacy which the patient would like at this time.  Discussed with patient that I do believe that physical therapy would benefit her.  Referral to physical therapy placed.  Follow-up with PCP on 1/5/2023.  Discussed heat to 3-5 times daily for 20 minutes at a time.  May trial Tylenol as well at home.  Discussed stretching and range of motion as tolerated.  Return precautions/ED precautions given.         Differential diagnosis, natural history, supportive care, and indications for immediate follow-up discussed.    Advised the patient to follow-up with the primary care physician for recheck, reevaluation, and consideration of further management.    Please note that this dictation was created using voice recognition software. I have made a reasonable attempt to correct obvious errors, but I expect that there are errors of grammar and possibly content that I did not discover before finalizing the note.    Electronically signed by Rob Braden PA-C.

## 2024-03-28 ENCOUNTER — APPOINTMENT (OUTPATIENT)
Dept: RADIOLOGY | Facility: MEDICAL CENTER | Age: 69
End: 2024-03-28
Attending: EMERGENCY MEDICINE
Payer: MEDICARE

## 2024-03-28 ENCOUNTER — HOSPITAL ENCOUNTER (EMERGENCY)
Facility: MEDICAL CENTER | Age: 69
End: 2024-03-28
Attending: EMERGENCY MEDICINE
Payer: MEDICARE

## 2024-03-28 VITALS
OXYGEN SATURATION: 100 % | WEIGHT: 154 LBS | DIASTOLIC BLOOD PRESSURE: 74 MMHG | HEART RATE: 69 BPM | RESPIRATION RATE: 11 BRPM | SYSTOLIC BLOOD PRESSURE: 159 MMHG | HEIGHT: 64 IN | TEMPERATURE: 96.8 F | BODY MASS INDEX: 26.29 KG/M2

## 2024-03-28 DIAGNOSIS — R53.83 FATIGUE, UNSPECIFIED TYPE: ICD-10-CM

## 2024-03-28 DIAGNOSIS — E16.2 HYPOGLYCEMIA: ICD-10-CM

## 2024-03-28 LAB
ALBUMIN SERPL BCP-MCNC: 4.1 G/DL (ref 3.2–4.9)
ALBUMIN/GLOB SERPL: 1.1 G/DL
ALP SERPL-CCNC: 135 U/L (ref 30–99)
ALT SERPL-CCNC: 10 U/L (ref 2–50)
ANION GAP SERPL CALC-SCNC: 13 MMOL/L (ref 7–16)
APPEARANCE UR: CLEAR
APTT PPP: 29.4 SEC (ref 24.7–36)
AST SERPL-CCNC: 17 U/L (ref 12–45)
BASOPHILS # BLD AUTO: 0.4 % (ref 0–1.8)
BASOPHILS # BLD: 0.03 K/UL (ref 0–0.12)
BILIRUB SERPL-MCNC: 0.3 MG/DL (ref 0.1–1.5)
BILIRUB UR QL STRIP.AUTO: NEGATIVE
BUN SERPL-MCNC: 21 MG/DL (ref 8–22)
CALCIUM ALBUM COR SERPL-MCNC: 8.9 MG/DL (ref 8.5–10.5)
CALCIUM SERPL-MCNC: 9 MG/DL (ref 8.5–10.5)
CHLORIDE SERPL-SCNC: 104 MMOL/L (ref 96–112)
CO2 SERPL-SCNC: 23 MMOL/L (ref 20–33)
COLOR UR: YELLOW
CREAT SERPL-MCNC: 1.1 MG/DL (ref 0.5–1.4)
EKG IMPRESSION: NORMAL
EOSINOPHIL # BLD AUTO: 0.13 K/UL (ref 0–0.51)
EOSINOPHIL NFR BLD: 1.9 % (ref 0–6.9)
ERYTHROCYTE [DISTWIDTH] IN BLOOD BY AUTOMATED COUNT: 39.6 FL (ref 35.9–50)
GFR SERPLBLD CREATININE-BSD FMLA CKD-EPI: 54 ML/MIN/1.73 M 2
GLOBULIN SER CALC-MCNC: 3.9 G/DL (ref 1.9–3.5)
GLUCOSE BLD STRIP.AUTO-MCNC: 209 MG/DL (ref 65–99)
GLUCOSE BLD STRIP.AUTO-MCNC: 250 MG/DL (ref 65–99)
GLUCOSE BLD STRIP.AUTO-MCNC: 58 MG/DL (ref 65–99)
GLUCOSE SERPL-MCNC: 59 MG/DL (ref 65–99)
GLUCOSE UR STRIP.AUTO-MCNC: 100 MG/DL
HCT VFR BLD AUTO: 29.4 % (ref 37–47)
HGB BLD-MCNC: 9.4 G/DL (ref 12–16)
IMM GRANULOCYTES # BLD AUTO: 0.01 K/UL (ref 0–0.11)
IMM GRANULOCYTES NFR BLD AUTO: 0.1 % (ref 0–0.9)
INR PPP: 1.39 (ref 0.87–1.13)
KETONES UR STRIP.AUTO-MCNC: NEGATIVE MG/DL
LEUKOCYTE ESTERASE UR QL STRIP.AUTO: NEGATIVE
LYMPHOCYTES # BLD AUTO: 1.19 K/UL (ref 1–4.8)
LYMPHOCYTES NFR BLD: 17.3 % (ref 22–41)
MCH RBC QN AUTO: 26.7 PG (ref 27–33)
MCHC RBC AUTO-ENTMCNC: 32 G/DL (ref 32.2–35.5)
MCV RBC AUTO: 83.5 FL (ref 81.4–97.8)
MICRO URNS: ABNORMAL
MONOCYTES # BLD AUTO: 0.47 K/UL (ref 0–0.85)
MONOCYTES NFR BLD AUTO: 6.9 % (ref 0–13.4)
NEUTROPHILS # BLD AUTO: 5.03 K/UL (ref 1.82–7.42)
NEUTROPHILS NFR BLD: 73.4 % (ref 44–72)
NITRITE UR QL STRIP.AUTO: NEGATIVE
NRBC # BLD AUTO: 0 K/UL
NRBC BLD-RTO: 0 /100 WBC (ref 0–0.2)
PH UR STRIP.AUTO: 7 [PH] (ref 5–8)
PLATELET # BLD AUTO: 235 K/UL (ref 164–446)
PMV BLD AUTO: 9.6 FL (ref 9–12.9)
POTASSIUM SERPL-SCNC: 3.5 MMOL/L (ref 3.6–5.5)
PROT SERPL-MCNC: 8 G/DL (ref 6–8.2)
PROT UR QL STRIP: NEGATIVE MG/DL
PROTHROMBIN TIME: 17.3 SEC (ref 12–14.6)
RBC # BLD AUTO: 3.52 M/UL (ref 4.2–5.4)
RBC UR QL AUTO: NEGATIVE
SODIUM SERPL-SCNC: 140 MMOL/L (ref 135–145)
SP GR UR STRIP.AUTO: 1.01
UROBILINOGEN UR STRIP.AUTO-MCNC: 0.2 MG/DL
WBC # BLD AUTO: 6.9 K/UL (ref 4.8–10.8)

## 2024-03-28 PROCEDURE — 80053 COMPREHEN METABOLIC PANEL: CPT

## 2024-03-28 PROCEDURE — 85610 PROTHROMBIN TIME: CPT

## 2024-03-28 PROCEDURE — 36415 COLL VENOUS BLD VENIPUNCTURE: CPT

## 2024-03-28 PROCEDURE — 700101 HCHG RX REV CODE 250: Performed by: EMERGENCY MEDICINE

## 2024-03-28 PROCEDURE — 96374 THER/PROPH/DIAG INJ IV PUSH: CPT

## 2024-03-28 PROCEDURE — 85730 THROMBOPLASTIN TIME PARTIAL: CPT

## 2024-03-28 PROCEDURE — 93005 ELECTROCARDIOGRAM TRACING: CPT | Performed by: EMERGENCY MEDICINE

## 2024-03-28 PROCEDURE — 81003 URINALYSIS AUTO W/O SCOPE: CPT

## 2024-03-28 PROCEDURE — 99285 EMERGENCY DEPT VISIT HI MDM: CPT

## 2024-03-28 PROCEDURE — 82962 GLUCOSE BLOOD TEST: CPT | Mod: 91

## 2024-03-28 PROCEDURE — 70450 CT HEAD/BRAIN W/O DYE: CPT

## 2024-03-28 PROCEDURE — 85025 COMPLETE CBC W/AUTO DIFF WBC: CPT

## 2024-03-28 RX ORDER — DEXTROSE MONOHYDRATE 25 G/50ML
50 INJECTION, SOLUTION INTRAVENOUS ONCE
Status: COMPLETED | OUTPATIENT
Start: 2024-03-28 | End: 2024-03-28

## 2024-03-28 RX ADMIN — DEXTROSE MONOHYDRATE 50 ML: 25 INJECTION, SOLUTION INTRAVENOUS at 13:53

## 2024-03-28 ASSESSMENT — FIBROSIS 4 INDEX: FIB4 SCORE: 1.42

## 2024-03-28 NOTE — DISCHARGE INSTRUCTIONS
Take your medication as prescribed.  Please eat a regular diet to avoid low blood sugar.  Return if worse or for any concerns.

## 2024-03-28 NOTE — ED TRIAGE NOTES
Chief Complaint   Patient presents with    Low Blood Sugar    Fatigue     Pt is abnormally lethargic and continues to fall asleep while talking with staff.      Pt BIB EMS from home for above. Per EMS pt's sugar was found to be 54 initially then came up to 79, blood sugar is 58 upon arrival to ED. Pt is somnolent and unable to stay awake while asking her questions. EMS reports pt fell in the shower this morning, pt doesn't recall fall, but doesn't think they hit their head. Pt wears 3L oxygen at baseline. Pt had positive orthostatics per EMS.   LKW: 2300 last night, per EMS.

## 2024-03-28 NOTE — ED PROVIDER NOTES
"ED Provider Note    CHIEF COMPLAINT  Chief Complaint   Patient presents with    Low Blood Sugar    Fatigue     Pt is abnormally lethargic and continues to fall asleep while talking with staff.        EXTERNAL RECORDS REVIEWED  PDMP no active prescriptions for sedatives or narcotics    HPI/ROS  LIMITATION TO HISTORY   Select: : None  OUTSIDE HISTORIAN(S):  Family , patient's son at bedside and spoke with her daughter over phone    Mckenna Sewell is a 68 y.o. female who presents with confusion and fatigue today, states she took all of her medications as usual.  When found to be slightly altered at home, given a sandwich by her family which she ate without difficulty.  She denies chest or abdominal pain.  No nausea or vomiting.  Her son at bedside states \"she keeps nodding off\".  Questionable fall this morning, she denies headache.  This is of concern, patient takes Eliquis.  No neck pain.  No chest pain.  No vomiting or diarrhea.  Per the patient and her family, both reports she has been eating her usual food and taking her medication as prescribed.  Patient states she takes insulin on a sliding scale, some days not does not require it.  Patient takes daily oral hypoglycemics.  She states she was recently started on Lyrica and methocarbamol for back pains.    PAST MEDICAL HISTORY   has a past medical history of MAXIMILIANO (acute kidney injury) ([facility]) (5/15/2022), Benign essential hypertension, CAD (coronary artery disease), CHF (congestive heart failure) ([facility]), Congestive heart failure ([facility]), COPD, COPD (chronic obstructive pulmonary disease) ([facility]), COVID-19 (11/9/2023), Dilated cardiomyopathy ([facility]), History of cardiac catheterization, Hypertension, Mixed hyperlipidemia, Nicotine dependence, Nicotine dependence, and Type 2 diabetes mellitus with complication ([facility]).    SURGICAL HISTORY   has a past surgical history that includes other cardiac surgery; appendectomy; open fix inter/subtroch fx,implnt (Right, 7/6/2022); upper " gi endoscopy,diagnosis (N/A, 11/28/2023); and colonoscopy,diagnostic (N/A, 11/28/2023).    FAMILY HISTORY  Family History   Problem Relation Age of Onset    Heart Disease Father     Heart Disease Mother     Heart Disease Maternal Aunt     Heart Attack Maternal Aunt     Heart Disease Maternal Uncle        SOCIAL HISTORY  Social History     Tobacco Use    Smoking status: Every Day     Current packs/day: 0.50     Average packs/day: 0.5 packs/day for 40.0 years (20.0 ttl pk-yrs)     Types: Cigarettes    Smokeless tobacco: Never    Tobacco comments:     1/2 ppd   Vaping Use    Vaping Use: Never used   Substance and Sexual Activity    Alcohol use: No    Drug use: No    Sexual activity: Not on file       CURRENT MEDICATIONS  Home Medications       Reviewed by Kelsea Villarreal R.N. (Registered Nurse) on 03/28/24 at 1339  Med List Status: Not Addressed     Medication Last Dose Status   acetaminophen (TYLENOL) 500 MG Tab  Active   apixaban (ELIQUIS) 5mg Tab  Active   atorvastatin (LIPITOR) 10 MG Tab  Active   carvedilol (COREG) 25 MG Tab  Active   ferrous sulfate 325 (65 Fe) MG tablet  Active   fluticasone furoate-vilanterol (BREO ELLIPTA) 200-25 MCG/ACT AEROSOL POWDER, BREATH ACTIVATED  Active   furosemide (LASIX) 20 MG Tab  Active   gabapentin (NEURONTIN) 300 MG Cap  Active   glipiZIDE (GLUCOTROL) 5 MG Tab  Active   Home Care Oxygen  Active   insulin aspart (NOVOLOG FLEXPEN) 100 UNIT/ML injection PEN  Active   lidocaine (LIDODERM) 5 % Patch  Active   linagliptin (TRADJENTA) 5 MG Tab tablet  Active   losartan (COZAAR) 50 MG Tab  Active   methocarbamol (ROBAXIN) 500 MG Tab  Active   omeprazole (PRILOSEC) 20 MG delayed-release capsule  Active   potassium chloride SA (KDUR) 20 MEQ Tab CR  Active                    ALLERGIES  Allergies   Allergen Reactions    Codeine Rash     All over body      Fluticasone-Salmeterol Itching    Lisinopril Rash and Itching     On body      Sulfa Drugs Rash and Itching     On body      Jardiance  "[Empagliflozin]      \"Dizziness\" per pt       PHYSICAL EXAM  VITAL SIGNS: /86   Pulse 64   Temp 35.9 °C (96.6 °F) (Temporal)   Resp 15   Ht 1.626 m (5' 4\")   Wt 69.9 kg (154 lb)   SpO2 99%   BMI 26.43 kg/m²    Constitutional: No acute distress  ENT: Extremities moist  Cardiac: Regular rate and rhythm  GI: Abdomen is soft, nontender, no guarding  Respiratory: Clear lung sounds  Neurologic: Sensation intact, strength intact, speech clear  Eyes: Pupils are equal.  No scleral icterus  Psychiatric: Calm, normal mood    EKG/LABS  Results for orders placed or performed during the hospital encounter of 03/28/24   CBC WITH DIFFERENTIAL   Result Value Ref Range    WBC 6.9 4.8 - 10.8 K/uL    RBC 3.52 (L) 4.20 - 5.40 M/uL    Hemoglobin 9.4 (L) 12.0 - 16.0 g/dL    Hematocrit 29.4 (L) 37.0 - 47.0 %    MCV 83.5 81.4 - 97.8 fL    MCH 26.7 (L) 27.0 - 33.0 pg    MCHC 32.0 (L) 32.2 - 35.5 g/dL    RDW 39.6 35.9 - 50.0 fL    Platelet Count 235 164 - 446 K/uL    MPV 9.6 9.0 - 12.9 fL    Neutrophils-Polys 73.40 (H) 44.00 - 72.00 %    Lymphocytes 17.30 (L) 22.00 - 41.00 %    Monocytes 6.90 0.00 - 13.40 %    Eosinophils 1.90 0.00 - 6.90 %    Basophils 0.40 0.00 - 1.80 %    Immature Granulocytes 0.10 0.00 - 0.90 %    Nucleated RBC 0.00 0.00 - 0.20 /100 WBC    Neutrophils (Absolute) 5.03 1.82 - 7.42 K/uL    Lymphs (Absolute) 1.19 1.00 - 4.80 K/uL    Monos (Absolute) 0.47 0.00 - 0.85 K/uL    Eos (Absolute) 0.13 0.00 - 0.51 K/uL    Baso (Absolute) 0.03 0.00 - 0.12 K/uL    Immature Granulocytes (abs) 0.01 0.00 - 0.11 K/uL    NRBC (Absolute) 0.00 K/uL   COMP METABOLIC PANEL   Result Value Ref Range    Sodium 140 135 - 145 mmol/L    Potassium 3.5 (L) 3.6 - 5.5 mmol/L    Chloride 104 96 - 112 mmol/L    Co2 23 20 - 33 mmol/L    Anion Gap 13.0 7.0 - 16.0    Glucose 59 (L) 65 - 99 mg/dL    Bun 21 8 - 22 mg/dL    Creatinine 1.10 0.50 - 1.40 mg/dL    Calcium 9.0 8.5 - 10.5 mg/dL    Correct Calcium 8.9 8.5 - 10.5 mg/dL    AST(SGOT) 17 12 - " 45 U/L    ALT(SGPT) 10 2 - 50 U/L    Alkaline Phosphatase 135 (H) 30 - 99 U/L    Total Bilirubin 0.3 0.1 - 1.5 mg/dL    Albumin 4.1 3.2 - 4.9 g/dL    Total Protein 8.0 6.0 - 8.2 g/dL    Globulin 3.9 (H) 1.9 - 3.5 g/dL    A-G Ratio 1.1 g/dL   APTT   Result Value Ref Range    APTT 29.4 24.7 - 36.0 sec   PROTHROMBIN TIME (INR)   Result Value Ref Range    PT 17.3 (H) 12.0 - 14.6 sec    INR 1.39 (H) 0.87 - 1.13   ESTIMATED GFR   Result Value Ref Range    GFR (CKD-EPI) 54 (A) >60 mL/min/1.73 m 2   URINALYSIS (UA)    Specimen: Urine   Result Value Ref Range    Color Yellow     Character Clear     Specific Gravity 1.006 <1.035    Ph 7.0 5.0 - 8.0    Glucose 100 (A) Negative mg/dL    Ketones Negative Negative mg/dL    Protein Negative Negative mg/dL    Bilirubin Negative Negative    Urobilinogen, Urine 0.2 Negative    Nitrite Negative Negative    Leukocyte Esterase Negative Negative    Occult Blood Negative Negative    Micro Urine Req see below    EKG (Now)   Result Value Ref Range    Report       [facility] Emergency Dept.    Test Date:  2024  Pt Name:    RUPESH WONG                 Department: ER  MRN:        [id number]                      Room:       [facility]  Gender:     Female                       Technician: [id number]  :        1955                   Requested By:KEITH TEAGUE  Order #:    [id number]                    Reading MD:    Measurements  Intervals                                Axis  Rate:       71                           P:          27  IL:         59                           QRS:        126  QRSD:       171                          T:          -69  QT:         512  QTc:        557    Interpretive Statements  Ventricular-paced complexes  No further analysis attempted due to paced rhythm  Compared to ECG 2023 18:29:24  Atrial-sensed ventricular-paced complex(es) or rhythm no longer present  Ventricular premature complex(es) no longer present     POCT glucose device  "results   Result Value Ref Range    POC Glucose, Blood 58 (L) 65 - 99 mg/dL   POCT glucose device results   Result Value Ref Range    POC Glucose, Blood 209 (H) 65 - 99 mg/dL   POCT glucose device results   Result Value Ref Range    POC Glucose, Blood 250 (H) 65 - 99 mg/dL       I have independently interpreted this EKG        RADIOLOGY  I have independently interpreted the diagnostic imaging associated with this visit and am waiting the final reading from the radiologist.   My preliminary interpretation is as follows: CT scan of the head is negative for acute hemorrhage    Radiologist interpretation:  CT-HEAD W/O   Final Result      1.  No evidence of acute intracranial process.      2.  Cerebral atrophy as well as periventricular chronic small vessel ischemic change.             COURSE & MEDICAL DECISION MAKING    ASSESSMENT, COURSE AND PLAN  Care Narrative: Patient complains of fatigue, low blood sugar.  She was treated with 1 amp of D50, blood sugar increased to 209.  Checked 1 hour later, 250.  Patient states she feels improved.  She remains somnolent.  Urinalysis obtained to rule out infection, this was negative.  No evidence of renal failure, no liver failure.  She may have taken too much insulin per her daughter who I spoke with on the phone at patient's request.  Regarding her somnolence, patient admitted to nursing staff away from her children that she had used methamphetamine yesterday and \"I do not want them told\".  Patient denies chest pain.  No difficulty breathing.  She has no strokelike symptoms today.  Per HIPAA law, I am unable to discuss this with the patient's children (her son at bedside, her daughter via phone).  With stable blood sugar at this time, patient stable for discharge.  She is advised to take her medication as prescribed, be careful with her insulin dosing, eat healthy diet.          ADDITIONAL PROBLEMS MANAGED  Methamphetamine use: Reported by the patient yesterday, she has " prohibited us from telling this to her children.  Patient is encouraged to never use again.    Anemia: Appears chronic, hemoglobin is stable    DISPOSITION AND DISCUSSIONS    Escalation of care considered, and ultimately not performed:acute inpatient care management, however at this time, the patient is most appropriate for outpatient management      Decision tools and prescription drugs considered including, but not limited to: Antibiotics were not indicated, no evidence of acute bacterial infection or UTI .    FINAL DIAGNOSIS  1. Hypoglycemia    2. Fatigue, unspecified type           Electronically signed by: Matias Martinez M.D., 3/28/2024 1:57 PM

## 2024-03-29 NOTE — ED NOTES
Discharge instructions given to pt. Prescriptions unchanged. Pt educated, verbalizes understanding. All belongings accounted for. Pt wheeled out of ED with family to go home. PIV removed and dressing applied.

## 2024-07-20 DIAGNOSIS — M54.50 LUMBAR BACK PAIN: ICD-10-CM

## 2024-08-31 ENCOUNTER — APPOINTMENT (OUTPATIENT)
Dept: RADIOLOGY | Facility: MEDICAL CENTER | Age: 69
End: 2024-08-31
Attending: EMERGENCY MEDICINE
Payer: MEDICARE

## 2024-08-31 ENCOUNTER — HOSPITAL ENCOUNTER (EMERGENCY)
Facility: MEDICAL CENTER | Age: 69
End: 2024-09-01
Attending: EMERGENCY MEDICINE
Payer: MEDICARE

## 2024-08-31 DIAGNOSIS — G89.29 OTHER CHRONIC PAIN: ICD-10-CM

## 2024-08-31 DIAGNOSIS — R22.2 CHEST WALL MASS: ICD-10-CM

## 2024-08-31 LAB
ALBUMIN SERPL BCP-MCNC: 3.8 G/DL (ref 3.2–4.9)
ALBUMIN/GLOB SERPL: 1 G/DL
ALP SERPL-CCNC: 115 U/L (ref 30–99)
ALT SERPL-CCNC: 8 U/L (ref 2–50)
ANION GAP SERPL CALC-SCNC: 13 MMOL/L (ref 7–16)
AST SERPL-CCNC: 17 U/L (ref 12–45)
BASOPHILS # BLD AUTO: 0.5 % (ref 0–1.8)
BASOPHILS # BLD: 0.04 K/UL (ref 0–0.12)
BILIRUB SERPL-MCNC: 0.3 MG/DL (ref 0.1–1.5)
BUN SERPL-MCNC: 15 MG/DL (ref 8–22)
CALCIUM ALBUM COR SERPL-MCNC: 9.6 MG/DL (ref 8.5–10.5)
CALCIUM SERPL-MCNC: 9.4 MG/DL (ref 8.5–10.5)
CHLORIDE SERPL-SCNC: 105 MMOL/L (ref 96–112)
CO2 SERPL-SCNC: 19 MMOL/L (ref 20–33)
CREAT SERPL-MCNC: 0.97 MG/DL (ref 0.5–1.4)
EKG IMPRESSION: NORMAL
EOSINOPHIL # BLD AUTO: 0.12 K/UL (ref 0–0.51)
EOSINOPHIL NFR BLD: 1.6 % (ref 0–6.9)
ERYTHROCYTE [DISTWIDTH] IN BLOOD BY AUTOMATED COUNT: 42.9 FL (ref 35.9–50)
GFR SERPLBLD CREATININE-BSD FMLA CKD-EPI: 62 ML/MIN/1.73 M 2
GLOBULIN SER CALC-MCNC: 3.7 G/DL (ref 1.9–3.5)
GLUCOSE SERPL-MCNC: 359 MG/DL (ref 65–99)
HCT VFR BLD AUTO: 26.2 % (ref 37–47)
HGB BLD-MCNC: 8.1 G/DL (ref 12–16)
IMM GRANULOCYTES # BLD AUTO: 0.03 K/UL (ref 0–0.11)
IMM GRANULOCYTES NFR BLD AUTO: 0.4 % (ref 0–0.9)
LACTATE SERPL-SCNC: 1.8 MMOL/L (ref 0.5–2)
LIPASE SERPL-CCNC: 85 U/L (ref 11–82)
LYMPHOCYTES # BLD AUTO: 1.82 K/UL (ref 1–4.8)
LYMPHOCYTES NFR BLD: 23.5 % (ref 22–41)
MCH RBC QN AUTO: 24.5 PG (ref 27–33)
MCHC RBC AUTO-ENTMCNC: 30.9 G/DL (ref 32.2–35.5)
MCV RBC AUTO: 79.4 FL (ref 81.4–97.8)
MONOCYTES # BLD AUTO: 0.73 K/UL (ref 0–0.85)
MONOCYTES NFR BLD AUTO: 9.4 % (ref 0–13.4)
NEUTROPHILS # BLD AUTO: 5 K/UL (ref 1.82–7.42)
NEUTROPHILS NFR BLD: 64.6 % (ref 44–72)
NRBC # BLD AUTO: 0 K/UL
NRBC BLD-RTO: 0 /100 WBC (ref 0–0.2)
PLATELET # BLD AUTO: 414 K/UL (ref 164–446)
PMV BLD AUTO: 9.4 FL (ref 9–12.9)
POTASSIUM SERPL-SCNC: 4.1 MMOL/L (ref 3.6–5.5)
PROT SERPL-MCNC: 7.5 G/DL (ref 6–8.2)
RBC # BLD AUTO: 3.3 M/UL (ref 4.2–5.4)
SODIUM SERPL-SCNC: 137 MMOL/L (ref 135–145)
TROPONIN T SERPL-MCNC: 26 NG/L (ref 6–19)
WBC # BLD AUTO: 7.7 K/UL (ref 4.8–10.8)

## 2024-08-31 PROCEDURE — 700117 HCHG RX CONTRAST REV CODE 255: Performed by: EMERGENCY MEDICINE

## 2024-08-31 PROCEDURE — 94760 N-INVAS EAR/PLS OXIMETRY 1: CPT

## 2024-08-31 PROCEDURE — 80053 COMPREHEN METABOLIC PANEL: CPT

## 2024-08-31 PROCEDURE — 83690 ASSAY OF LIPASE: CPT

## 2024-08-31 PROCEDURE — 83605 ASSAY OF LACTIC ACID: CPT

## 2024-08-31 PROCEDURE — 84484 ASSAY OF TROPONIN QUANT: CPT

## 2024-08-31 PROCEDURE — 36415 COLL VENOUS BLD VENIPUNCTURE: CPT

## 2024-08-31 PROCEDURE — 93005 ELECTROCARDIOGRAM TRACING: CPT | Performed by: EMERGENCY MEDICINE

## 2024-08-31 PROCEDURE — 85025 COMPLETE CBC W/AUTO DIFF WBC: CPT

## 2024-08-31 PROCEDURE — 99285 EMERGENCY DEPT VISIT HI MDM: CPT

## 2024-08-31 PROCEDURE — 96374 THER/PROPH/DIAG INJ IV PUSH: CPT | Mod: XU

## 2024-08-31 PROCEDURE — 700111 HCHG RX REV CODE 636 W/ 250 OVERRIDE (IP): Mod: JZ,UD | Performed by: EMERGENCY MEDICINE

## 2024-08-31 PROCEDURE — 71275 CT ANGIOGRAPHY CHEST: CPT

## 2024-08-31 PROCEDURE — 71045 X-RAY EXAM CHEST 1 VIEW: CPT

## 2024-08-31 RX ORDER — MORPHINE SULFATE 4 MG/ML
2 INJECTION INTRAVENOUS ONCE
Status: COMPLETED | OUTPATIENT
Start: 2024-08-31 | End: 2024-08-31

## 2024-08-31 RX ORDER — OXYCODONE HYDROCHLORIDE 5 MG/1
5 TABLET ORAL EVERY 4 HOURS PRN
Qty: 15 TABLET | Refills: 0 | Status: SHIPPED | OUTPATIENT
Start: 2024-08-31 | End: 2024-09-04

## 2024-08-31 RX ORDER — HYDROCODONE BITARTRATE AND ACETAMINOPHEN 5; 325 MG/1; MG/1
1 TABLET ORAL ONCE
Status: COMPLETED | OUTPATIENT
Start: 2024-09-01 | End: 2024-09-01

## 2024-08-31 RX ADMIN — IOHEXOL 45 ML: 350 INJECTION, SOLUTION INTRAVENOUS at 22:45

## 2024-08-31 RX ADMIN — MORPHINE SULFATE 2 MG: 4 INJECTION, SOLUTION INTRAMUSCULAR; INTRAVENOUS at 20:44

## 2024-08-31 ASSESSMENT — PAIN DESCRIPTION - PAIN TYPE
TYPE: ACUTE PAIN
TYPE: CHRONIC PAIN

## 2024-08-31 ASSESSMENT — FIBROSIS 4 INDEX: FIB4 SCORE: 1.4

## 2024-09-01 ENCOUNTER — PHARMACY VISIT (OUTPATIENT)
Dept: PHARMACY | Facility: MEDICAL CENTER | Age: 69
End: 2024-09-01
Payer: COMMERCIAL

## 2024-09-01 VITALS
RESPIRATION RATE: 20 BRPM | SYSTOLIC BLOOD PRESSURE: 151 MMHG | HEART RATE: 78 BPM | TEMPERATURE: 98 F | HEIGHT: 64 IN | WEIGHT: 154 LBS | OXYGEN SATURATION: 97 % | DIASTOLIC BLOOD PRESSURE: 98 MMHG | BODY MASS INDEX: 26.29 KG/M2

## 2024-09-01 PROCEDURE — RXMED WILLOW AMBULATORY MEDICATION CHARGE: Performed by: EMERGENCY MEDICINE

## 2024-09-01 PROCEDURE — 700102 HCHG RX REV CODE 250 W/ 637 OVERRIDE(OP): Performed by: EMERGENCY MEDICINE

## 2024-09-01 PROCEDURE — A9270 NON-COVERED ITEM OR SERVICE: HCPCS | Performed by: EMERGENCY MEDICINE

## 2024-09-01 RX ADMIN — HYDROCODONE BITARTRATE AND ACETAMINOPHEN 1 TABLET: 5; 325 TABLET ORAL at 00:00

## 2024-09-01 NOTE — ED TRIAGE NOTES
"Chief Complaint   Patient presents with    Shortness of Breath     BIBA for SOB x 2-3 days, during transport pt developed 10/10 back pain. On 4l NC at baseline, given albuterol treatment, 75 mcg fentanyl IM PTA. Pt has Hx of \" a lung mass\" and ICD.        "

## 2024-09-01 NOTE — ED NOTES
Break RN: CT scan resulted. Chart up for MD to re-eval. Patient asking for pain medication. Will notify ERP.

## 2024-09-01 NOTE — ED PROVIDER NOTES
"ED Provider Note    CHIEF COMPLAINT  Chief Complaint   Patient presents with    Shortness of Breath     BIBA for SOB x 2-3 days, during transport pt developed 10/10 back pain. On 4l NC at baseline, given albuterol treatment, 75 mcg fentanyl IM PTA. Pt has Hx of \" a lung mass\" and ICD.        EXTERNAL RECORDS REVIEWED  External ED Note [facility] notes obtained and reviewed.  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  On Norco for pain in addition to tramadol which were at this point historically not been alleviating her ongoing chest discomfort.  Ultimate diagnoses that day to include chronic right rib pain and pneumonia.  Discharged on oxycodone.  Patient has a referral to [facility] given the chest wall mass.    HPI/ROS  LIMITATION TO HISTORY   Select: : None  OUTSIDE HISTORIAN(S):  None    Mckenna Sewell is a 68 y.o. female who presents to the emerged part with present stent dyspnea and chest pain.  Past medical history as taken below.  States that she has recently been followed by cardiology and care team at [facility].  Recent pacemaker/defibrillator replacement.  Primarily lives in [place] but does have daughter here locally which she is currently staying with due to the ongoing procedures and recurrent admissions secondary to persistent chest pain and dyspnea.  Today was in [place] and closer to this hospital when she was having worsening symptoms and therefore came to this ER for further care workup due to increasing pain.  Chronic oxygen supplementation at 3 to 4 L due to chronic COPD.  She continues to have a chronic smoking history.  EMS provided her with albuterol treatment and fentanyl for comfort.    PAST MEDICAL HISTORY   has a past medical history of MAXIMILIANO (acute kidney injury) ([facility]) (5/15/2022), Benign essential hypertension, CAD (coronary artery disease), CHF " "(congestive heart failure) ([facility]), Congestive heart failure ([facility]), COPD, COPD (chronic obstructive pulmonary disease) ([facility]), COVID-19 (11/9/2023), Dilated cardiomyopathy ([facility]), History of cardiac catheterization, Hypertension, Mixed hyperlipidemia, Nicotine dependence, Nicotine dependence, and Type 2 diabetes mellitus with complication ([facility]).    SURGICAL HISTORY   has a past surgical history that includes other cardiac surgery; appendectomy; open fix inter/subtroch fx,implnt (Right, 7/6/2022); upper gi endoscopy,diagnosis (N/A, 11/28/2023); and colonoscopy,diagnostic (N/A, 11/28/2023).    FAMILY HISTORY  Family History   Problem Relation Age of Onset    Heart Disease Father     Heart Disease Mother     Heart Disease Maternal Aunt     Heart Attack Maternal Aunt     Heart Disease Maternal Uncle        SOCIAL HISTORY  Social History     Tobacco Use    Smoking status: Every Day     Current packs/day: 0.50     Average packs/day: 0.5 packs/day for 40.0 years (20.0 ttl pk-yrs)     Types: Cigarettes    Smokeless tobacco: Never    Tobacco comments:     1/2 ppd   Vaping Use    Vaping status: Never Used   Substance and Sexual Activity    Alcohol use: No    Drug use: No    Sexual activity: Not on file       CURRENT MEDICATIONS  Home Medications    **Home medications have not yet been reviewed for this encounter**         ALLERGIES  Allergies   Allergen Reactions    Codeine Rash     All over body      Fluticasone-Salmeterol Itching    Lisinopril Rash and Itching     On body      Sulfa Drugs Rash and Itching     On body      Jardiance [Empagliflozin]      \"Dizziness\" per pt       PHYSICAL EXAM  VITAL SIGNS: BP (!) 151/98   Pulse 78   Temp 36.7 °C (98 °F) (Temporal)   Resp 20   Ht 1.626 m (5' 4\")   Wt 69.9 kg (154 lb)   SpO2 97%   BMI 26.43 kg/m²          Pulse ox interpretation: I interpret this pulse ox as normal.  Constitutional: Alert in no apparent distress.  HENT: No signs of trauma, Bilateral external ears normal, " Nose normal.   Eyes: Pupils are equal and reactive  Cardiovascular: Regular rate and rhythm, no murmurs.   Thorax & Lungs: Normal breath sounds, No respiratory distress, chronic nasal cannula oxygen supplementation in place.  Chest wall tenderness on palpation  Abdomen: Bowel sounds normal, Soft, No tenderness  Skin: Warm, Dry, No erythema, No rash.   Musculoskeletal: Good range of motion in all major joints. No tenderness to palpation or major deformities noted.   Neurologic: Alert , Normal motor function, Normal sensory function, No focal deficits noted.   Psychiatric: Affect normal, Judgment normal, Mood normal.         EKG/LABS  Results for orders placed or performed during the hospital encounter of 08/31/24   CBC w/ Differential   Result Value Ref Range    WBC 7.7 4.8 - 10.8 K/uL    RBC 3.30 (L) 4.20 - 5.40 M/uL    Hemoglobin 8.1 (L) 12.0 - 16.0 g/dL    Hematocrit 26.2 (L) 37.0 - 47.0 %    MCV 79.4 (L) 81.4 - 97.8 fL    MCH 24.5 (L) 27.0 - 33.0 pg    MCHC 30.9 (L) 32.2 - 35.5 g/dL    RDW 42.9 35.9 - 50.0 fL    Platelet Count 414 164 - 446 K/uL    MPV 9.4 9.0 - 12.9 fL    Neutrophils-Polys 64.60 44.00 - 72.00 %    Lymphocytes 23.50 22.00 - 41.00 %    Monocytes 9.40 0.00 - 13.40 %    Eosinophils 1.60 0.00 - 6.90 %    Basophils 0.50 0.00 - 1.80 %    Immature Granulocytes 0.40 0.00 - 0.90 %    Nucleated RBC 0.00 0.00 - 0.20 /100 WBC    Neutrophils (Absolute) 5.00 1.82 - 7.42 K/uL    Lymphs (Absolute) 1.82 1.00 - 4.80 K/uL    Monos (Absolute) 0.73 0.00 - 0.85 K/uL    Eos (Absolute) 0.12 0.00 - 0.51 K/uL    Baso (Absolute) 0.04 0.00 - 0.12 K/uL    Immature Granulocytes (abs) 0.03 0.00 - 0.11 K/uL    NRBC (Absolute) 0.00 K/uL   Complete Metabolic Panel (CMP)   Result Value Ref Range    Sodium 137 135 - 145 mmol/L    Potassium 4.1 3.6 - 5.5 mmol/L    Chloride 105 96 - 112 mmol/L    Co2 19 (L) 20 - 33 mmol/L    Anion Gap 13.0 7.0 - 16.0    Glucose 359 (H) 65 - 99 mg/dL    Bun 15 8 - 22 mg/dL    Creatinine 0.97 0.50 -  1.40 mg/dL    Calcium 9.4 8.5 - 10.5 mg/dL    Correct Calcium 9.6 8.5 - 10.5 mg/dL    AST(SGOT) 17 12 - 45 U/L    ALT(SGPT) 8 2 - 50 U/L    Alkaline Phosphatase 115 (H) 30 - 99 U/L    Total Bilirubin 0.3 0.1 - 1.5 mg/dL    Albumin 3.8 3.2 - 4.9 g/dL    Total Protein 7.5 6.0 - 8.2 g/dL    Globulin 3.7 (H) 1.9 - 3.5 g/dL    A-G Ratio 1.0 g/dL   Troponin - STAT Once   Result Value Ref Range    Troponin T 26 (H) 6 - 19 ng/L   Lipase   Result Value Ref Range    Lipase 85 (H) 11 - 82 U/L   LACTIC ACID   Result Value Ref Range    Lactic Acid 1.8 0.5 - 2.0 mmol/L   ESTIMATED GFR   Result Value Ref Range    GFR (CKD-EPI) 62 >60 mL/min/1.73 m 2   EKG   Result Value Ref Range    Report       [facility] Emergency Dept.    Test Date:  2024  Pt Name:    RUPESH WONG                 Department: ER  MRN:        [id number]                      Room:       [facility]  Gender:     Female                       Technician: [id number]  :        1955                   Requested By:ER TRIAGE PROTOCOL  Order #:    [id number]                    Reading MD: Ilan Herring    Measurements  Intervals                                Axis  Rate:       76                           P:          48  MS:         149                          QRS:        122  QRSD:       142                          T:          -60  QT:         460  QTc:        518    Interpretive Statements  Atrial-sensed ventricular-paced rhythm  No further analysis attempted due to paced rhythm  Compared to ECG 2024 14:07:59  No significant changes  Electronically Signed On 2024 22:14:47 PDT by Ilan Herring         I have independently interpreted this EKG    RADIOLOGY/PROCEDURES   I have independently interpreted the diagnostic imaging associated with this visit and am waiting the final reading from the radiologist.   My preliminary interpretation is as follows: No large PE    Radiologist interpretation:  CT-CTA CHEST PULMONARY ARTERY W/ RECONS   Final  Result      1.  No evidence of pulmonary embolism.   2.  Soft tissue masses associated with/adjacent to sixth-eighth right rib fractures. This is concerning for neoplasm with pathologic fractures, though these fractures were present on prior CT chest and did not appear to have a neoplastic component at    that time. Less likely this could represent exuberant callus formation.   3.  Cardiomegaly.   4.  Emphysema.   5.  Pulmonary nodules measuring up to 8 mm in the right upper lobe comment new from prior.      Low Risk: CT at 6-12 months, then consider CT at 18-24 months      High Risk: CT at 6-12 months, then CT at 18-24 months      Low Risk - Minimal or absent history of smoking and of other known risk factors.      High Risk - History of smoking or of other known risk factors.      Note: These recommendations do not apply to lung cancer screening, patients with immunosuppression, or patients with known primary cancer.      Fleischner Society 2017 Guidelines for Management of Incidentally Detected Pulmonary Nodules in Adults      DX-CHEST-PORTABLE (1 VIEW)   Final Result      1.  Interstitial infiltrates and/or edema.   2.  Trace left pleural effusion with adjacent airspace disease.   3.  Chronic right rib fractures.          COURSE & MEDICAL DECISION MAKING    ASSESSMENT, COURSE AND PLAN  Care Narrative: Patient presenting emerged apartment with acute on chronic chest pain.  Recent diagnosis of concerning chest wall mass suspicious for neoplastic process and pathologic fracture of ribs.  She has been followed at [facility] for this.  She is already on Norco and tramadol at home.  Will provide additional analgesic control here while repeating workup.  Given potential oncologic process will get CTA imaging to evaluate for possibility of PE at this point given worsening pain and dyspnea.    DISPOSITION AND DISCUSSIONS  I have discussed management of the patient with the following physicians and RINKU's:  None    Discussion of management with other \[facility] [place] [facility]\"" or appropriate source(s): None     Escalation of care considered, and ultimately not performed:acute inpatient care management, however at this time, the patient is most appropriate for outpatient management    Barriers to care at this time, including but not limited to:  None .     Decision tools and prescription drugs considered including, but not limited to: Pain Medications patient provided with additional pain medication given recent diagnosis of oncologic process and pending outpatient oncology visit .    In prescribing controlled substances to this patient, I certify that I have obtained and reviewed the medical history of Mckenna Sewell. I have also made a good malena effort to obtain applicable records from other providers who have treated the patient and no other records are available at this time.     I have conducted a physical exam and documented it. I have reviewed Ms. Sewell’s prescription history as maintained by the [place] Prescription Monitoring Program.     I have assessed the patient’s risk for abuse, dependency, and addiction using the validated Opioid Risk Tool available at https://www.mdcalc.com/dnumrn-wlor-doxx-ort-narcotic-abuse.     Given the above, I believe the benefits of controlled substance therapy outweigh the risks. The reasons for prescribing controlled substances include non-narcotic, oral analgesic alternatives have been inadequate for pain control. Accordingly, I have discussed the risk and benefits, treatment plan, and alternative therapies with the patient.     68-year-old female presenting the emerged part with above presentation.  Workup is reassuring.  Chronically anemic and largely unchanged from baseline.  She has a minimal troponin elevation but likely secondary to her recent cardiac pacemaker surgery.  Have a lower suspicion for ACS at this time.  Heart score is low.  Patient has been provided with multiple doses of pain  medication here and prescribed the same for home.  She is understanding of outpatient care and follow-up.  Will return here to the ER with any change or worsening.      FINAL DIAGNOSIS  1. Chest wall mass    2. Other chronic pain         Electronically signed by: Ilan Herring M.D., 8/31/2024 8:36 PM

## 2024-09-01 NOTE — ED NOTES
Reviewed discharge paperwork with patient. Patient verbalized understanding of instructions and medication. Picked up script from the pharmacy and delivered to pt. Will f/u accordingly. Denies further questions at this time. Escorted out of ED to pts daughter.

## 2024-10-18 RX ORDER — PREDNISONE 10 MG/1
10 TABLET ORAL DAILY
Qty: 5 TABLET | Refills: 0 | OUTPATIENT
Start: 2024-10-18

## 2025-03-31 NOTE — ASSESSMENT & PLAN NOTE
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

## (undated) DEVICE — PROTECTOR ULNA NERVE - (36PR/CA)

## (undated) DEVICE — MASK O2 VNYL ADLT RBRTH HI - (50/CS)

## (undated) DEVICE — CUFF BP ADULT LARGE DISPOSABLE (20EA/CA)

## (undated) DEVICE — PENCIL ELECTSURG 10FT BTN SWH - (50/CA)

## (undated) DEVICE — SUCTION INSTRUMENT YANKAUER BULBOUS TIP W/O VENT (50EA/CA)

## (undated) DEVICE — SYRINGE DISP. 60 CC LL - (30/BX, 12BX/CA)**WHEN THESE ARE GONE ORDER #500206**

## (undated) DEVICE — PROBE CIRCUMFERENTIAL 2.3CM X 220CM (10EA/CA)

## (undated) DEVICE — SYRINGE SAFETY 10 ML 18 GA X 1 1/2 BLUNT LL (100/BX 4BX/CA)

## (undated) DEVICE — LACTATED RINGERS INJ 1000 ML - (14EA/CA 60CA/PF)

## (undated) DEVICE — SENSOR OXIMETER ADULT SPO2 RD SET (20EA/BX)

## (undated) DEVICE — TOWELS CLOTH SURGICAL - (4/PK 20PK/CA)

## (undated) DEVICE — ELECTRODE 850 FOAM ADHESIVE - HYDROGEL RADIOTRNSPRNT (50/PK)

## (undated) DEVICE — TUBE CONNECT SUCTION CLEAR 120 X 1/4" (50EA/CA)"

## (undated) DEVICE — STAPLER SKIN DISP - (6/BX 10BX/CA) VISISTAT

## (undated) DEVICE — BIT DRILL SHORT 4.2MM X 155MM (4TX2=8)

## (undated) DEVICE — KIT ANESTHESIA W/CIRCUIT & 3/LT BAG W/FILTER (20EA/CA)

## (undated) DEVICE — TOWEL STOP TIMEOUT SAFETY FLAG (40EA/CA)

## (undated) DEVICE — GLOVE BIOGEL SZ 7.5 SURGICAL PF LTX - (50PR/BX 4BX/CA)

## (undated) DEVICE — CANISTER SUCTION 3000ML MECHANICAL FILTER AUTO SHUTOFF MEDI-VAC NONSTERILE LF DISP  (40EA/CA)

## (undated) DEVICE — CANISTER SUCTION RIGID RED 1500CC (40EA/CA)

## (undated) DEVICE — GOWN SURGEONS LARGE - (32/CA)

## (undated) DEVICE — SLEEVE, VASO, THIGH, MED

## (undated) DEVICE — SODIUM CHL IRRIGATION 0.9% 1000ML (12EA/CA)

## (undated) DEVICE — GLOVE BIOGEL INDICATOR SZ 8 SURGICAL PF LTX - (50/BX 4BX/CA)

## (undated) DEVICE — GUIDE PIN CALIBRATED (5EA/PK) (4TX6=24)

## (undated) DEVICE — KIT CUSTOM PROCEDURE SINGLE FOR ENDO  (15/CA)

## (undated) DEVICE — GOWN WARMING STANDARD FLEX - (30/CA)

## (undated) DEVICE — TUBING CLEARLINK DUO-VENT - C-FLO (48EA/CA)

## (undated) DEVICE — SOD. CHL. INJ. 0.9% 1000 ML - (14EA/CA 60CA/PF)

## (undated) DEVICE — BLOCK BITE ENDOSCOPIC 2809 - (100/BX) INTERMEDIATE

## (undated) DEVICE — SPONGE GAUZE NON-STERILE 4X4 - (2000/CA 10PK/CA)

## (undated) DEVICE — SUTURE GENERAL

## (undated) DEVICE — TUBE CONNECTING SUCTION - CLEAR PLASTIC STERILE 72 IN (50EA/CA)

## (undated) DEVICE — WATER IRRIGATION STERILE 1000ML (12EA/CA)

## (undated) DEVICE — PAD PREP 24 X 48 CUFFED - (100/CA)

## (undated) DEVICE — SET LEADWIRE 5 LEAD BEDSIDE DISPOSABLE ECG (1SET OF 5/EA)

## (undated) DEVICE — DRAPE LARGE 3 QUARTER - (20/CA)

## (undated) DEVICE — SUCTION INSTRUMENT YANKAUER OPEN TIP W/O VENT (50EA/CA)

## (undated) DEVICE — FORCEP RADIAL JAW 4 STANDARD CAPACITY W/NEEDLE 240CM (40EA/BX)

## (undated) DEVICE — MASK WITH FACE SHIELD (25/BX 4BX/CA)

## (undated) DEVICE — TUBE SUCTION YANKAUER  1/4 X 6FT (20EA/CA)"

## (undated) DEVICE — DRAPE 36X28IN RAD CARM BND BG - (25/CA) O

## (undated) DEVICE — ELECTRODE DUAL RETURN W/ CORD - (50/PK)

## (undated) DEVICE — CANNULA O2 COMFORT SOFT EAR ADULT 7 FT TUBING (50/CA)

## (undated) DEVICE — HEAD HOLDER JUNIOR/ADULT

## (undated) DEVICE — SUTURE 2-0 VICRYL PLUS CT-1 - 8 X 18 INCH(12/BX)

## (undated) DEVICE — BAG SPONGE COUNT 10.25 X 32 - BLUE (250/CA)

## (undated) DEVICE — SYRINGE SAFETY 3 ML 18 GA X 1 1/2 BLUNT LL (100/BX 8BX/CA)

## (undated) DEVICE — SYRINGE SAFETY 5 ML 18 GA X 1-1/2 BLUNT LL (100/BX 4BX/CA)

## (undated) DEVICE — DRESSING TRANSPARENT FILM TEGADERM 4 X 4.75" (50EA/BX)"

## (undated) DEVICE — SET EXTENSION WITH 2 PORTS (48EA/CA) ***PART #2C8610 IS A SUBSTITUTE*****